# Patient Record
Sex: FEMALE | Race: BLACK OR AFRICAN AMERICAN | Employment: FULL TIME | URBAN - METROPOLITAN AREA
[De-identification: names, ages, dates, MRNs, and addresses within clinical notes are randomized per-mention and may not be internally consistent; named-entity substitution may affect disease eponyms.]

---

## 2017-08-23 ENCOUNTER — HOSPITAL ENCOUNTER (OUTPATIENT)
Dept: MAMMOGRAPHY | Age: 47
Discharge: HOME | End: 2017-08-23
Attending: OBSTETRICS & GYNECOLOGY
Payer: COMMERCIAL

## 2017-08-23 DIAGNOSIS — Z12.31 VISIT FOR SCREENING MAMMOGRAM: ICD-10-CM

## 2017-08-23 PROCEDURE — 77067 SCR MAMMO BI INCL CAD: CPT

## 2018-02-23 ENCOUNTER — HOSPITAL ENCOUNTER (EMERGENCY)
Age: 48
Discharge: HOME | End: 2018-02-23
Attending: EMERGENCY MEDICINE
Payer: COMMERCIAL

## 2018-02-23 VITALS
HEART RATE: 74 BPM | DIASTOLIC BLOOD PRESSURE: 95 MMHG | SYSTOLIC BLOOD PRESSURE: 185 MMHG | RESPIRATION RATE: 18 BRPM | WEIGHT: 265 LBS | OXYGEN SATURATION: 98 % | TEMPERATURE: 98 F | HEIGHT: 67 IN | BODY MASS INDEX: 41.59 KG/M2

## 2018-02-23 DIAGNOSIS — R47.1 DYSARTHRIA: Primary | ICD-10-CM

## 2018-02-23 LAB
ALBUMIN SERPL-MCNC: 3.1 G/DL (ref 3.5–5)
ALBUMIN/GLOB SERPL: 0.6 {RATIO} (ref 1.1–2.2)
ALP SERPL-CCNC: 83 U/L (ref 45–117)
ALT SERPL-CCNC: 23 U/L (ref 12–78)
ANION GAP SERPL CALC-SCNC: 6 MMOL/L (ref 5–15)
AST SERPL-CCNC: 19 U/L (ref 15–37)
BASOPHILS # BLD: 0 K/UL (ref 0–0.1)
BASOPHILS NFR BLD: 0 % (ref 0–1)
BILIRUB SERPL-MCNC: 0.8 MG/DL (ref 0.2–1)
BUN SERPL-MCNC: 13 MG/DL (ref 6–20)
BUN/CREAT SERPL: 14 (ref 12–20)
CALCIUM SERPL-MCNC: 8.6 MG/DL (ref 8.5–10.1)
CHLORIDE SERPL-SCNC: 102 MMOL/L (ref 97–108)
CO2 SERPL-SCNC: 29 MMOL/L (ref 21–32)
CREAT SERPL-MCNC: 0.92 MG/DL (ref 0.55–1.02)
DIFFERENTIAL METHOD BLD: ABNORMAL
EOSINOPHIL # BLD: 0.2 K/UL (ref 0–0.4)
EOSINOPHIL NFR BLD: 2 % (ref 0–7)
ERYTHROCYTE [DISTWIDTH] IN BLOOD BY AUTOMATED COUNT: 14.6 % (ref 11.5–14.5)
GLOBULIN SER CALC-MCNC: 4.8 G/DL (ref 2–4)
GLUCOSE SERPL-MCNC: 99 MG/DL (ref 65–100)
HCT VFR BLD AUTO: 38.4 % (ref 35–47)
HGB BLD-MCNC: 12.6 G/DL (ref 11.5–16)
IMM GRANULOCYTES # BLD: 0 K/UL (ref 0–0.04)
IMM GRANULOCYTES NFR BLD AUTO: 0 % (ref 0–0.5)
LYMPHOCYTES # BLD: 1.7 K/UL (ref 0.8–3.5)
LYMPHOCYTES NFR BLD: 23 % (ref 12–49)
MCH RBC QN AUTO: 25.9 PG (ref 26–34)
MCHC RBC AUTO-ENTMCNC: 32.8 G/DL (ref 30–36.5)
MCV RBC AUTO: 78.9 FL (ref 80–99)
MONOCYTES # BLD: 0.5 K/UL (ref 0–1)
MONOCYTES NFR BLD: 7 % (ref 5–13)
NEUTS SEG # BLD: 5.2 K/UL (ref 1.8–8)
NEUTS SEG NFR BLD: 68 % (ref 32–75)
NRBC # BLD: 0 K/UL (ref 0–0.01)
NRBC BLD-RTO: 0 PER 100 WBC
PLATELET # BLD AUTO: 379 K/UL (ref 150–400)
PMV BLD AUTO: 9.9 FL (ref 8.9–12.9)
POTASSIUM SERPL-SCNC: 3.4 MMOL/L (ref 3.5–5.1)
PROT SERPL-MCNC: 7.9 G/DL (ref 6.4–8.2)
RBC # BLD AUTO: 4.87 M/UL (ref 3.8–5.2)
SODIUM SERPL-SCNC: 137 MMOL/L (ref 136–145)
WBC # BLD AUTO: 7.7 K/UL (ref 3.6–11)

## 2018-02-23 PROCEDURE — 97116 GAIT TRAINING THERAPY: CPT

## 2018-02-23 PROCEDURE — 97161 PT EVAL LOW COMPLEX 20 MIN: CPT

## 2018-02-23 PROCEDURE — 80053 COMPREHEN METABOLIC PANEL: CPT | Performed by: EMERGENCY MEDICINE

## 2018-02-23 PROCEDURE — 99285 EMERGENCY DEPT VISIT HI MDM: CPT

## 2018-02-23 PROCEDURE — 85025 COMPLETE CBC W/AUTO DIFF WBC: CPT | Performed by: EMERGENCY MEDICINE

## 2018-02-23 PROCEDURE — 36415 COLL VENOUS BLD VENIPUNCTURE: CPT | Performed by: EMERGENCY MEDICINE

## 2018-02-23 RX ORDER — LOSARTAN POTASSIUM AND HYDROCHLOROTHIAZIDE 25; 100 MG/1; MG/1
1 TABLET ORAL DAILY
COMMUNITY
End: 2018-11-27 | Stop reason: SDUPTHER

## 2018-02-23 NOTE — PROGRESS NOTES
physical Therapy Emergency Department EVALUATION/DISCHARGE  Patient: Armando Borjas (89 y.o. female)  Date: 2/23/2018  Primary Diagnosis: s/p pseudo seizure          Precautions: Fall  ASSESSMENT :  Based on the objective data described below, the patient presents with impaired speech, slightly impaired strength, balance, and activity tolerance s/p pseudo seizure. Patient was able to safely ambulate but requires increased time and reports legs feeling heavy. Cues for safety with ambulation. Patient reports current symptoms are typical post pseudo seizure and resolve on their own. Patient demonstrates good insight into deficits and safety awareness. Patient safe to return home when medically cleared and no additional therapy needs indicated. Further acute physical therapy is not indicated at this time. PLAN :  Discharge Recommendations:   [x]   Home with family  []   Skilled nursing facility  []   Admission to hospital with rehab likely needed  []   Inpatient rehab referral  []   Outpatient physical therapy referral  []   Other:    Further Equipment Recommendations for Discharge: None  []   Rolling walker with 5\" wheels  []   Crutches   []   Elise Boyers   []   Wheelchair   []   Other:     COMMUNICATION/EDUCATION:   Communication/Collaboration:  [x]   Fall prevention education was provided and the patient/caregiver indicated understanding. [x]   Patient/family have participated as able and agree with findings and recommendations. []   Patient is unable to participate in plan of care at this time. Findings and recommendations were discussed with: ED Physician and Registered Nurse     SUBJECTIVE:   Patient stated This is what happens.     OBJECTIVE DATA SUMMARY:   HISTORY:    Past Medical History:   Diagnosis Date    Heart murmur     Hypertension     Ill-defined condition     pseudo seizure     Past Surgical History:   Procedure Laterality Date    HX GYN      \"cyst removal\"    HX SEPTOPLASTY      HX TONSIL AND ADENOIDECTOMY       Prior Level of Function/Home Situation: prior independence, works as a teacher   Personal factors and/or comorbidities impacting plan of care: prior pseudo seizures    EXAMINATION/PRESENTATION/DECISION MAKING:   Critical Behavior:  Neurologic State: Alert  Orientation Level: Oriented X4  Cognition: Appropriate safety awareness, Follows commands, Appropriate for age attention/concentration, Appropriate decision making     Hearing: Auditory  Auditory Impairment: None    Range Of Motion:  AROM: Within functional limits     Strength:    Strength: Generally decreased, functional (patient reports typical post pseudo seizures)     Tone & Sensation:   Tone: Normal  Sensation: Intact      Coordination:  Coordination: Generally decreased, functional (patient reports typical post pseudo seizures)    Functional Mobility:  Bed Mobility:  Rolling: Independent  Supine to Sit: Independent  Sit to Supine: Independent  Scooting: Independent  Transfers:  Sit to Stand: Independent  Stand to Sit: Independent     Balance:   Sitting: Intact  Standing: Impaired  Standing - Static: Good  Standing - Dynamic : Fair (but patient with good insight/compensation)  Ambulation/Gait Training:  Distance (ft): 60 Feet (ft)  Ambulation - Level of Assistance: Supervision  Gait Abnormalities: Decreased step clearance;Trunk sway increased (initially dragging of feet, cued to increase step clearance)  Base of Support: Widened  Speed/Rosalind: Pace decreased (<100 feet/min)  Step Length: Right shortened;Left shortened      Patient slow, cues to increase step clearance for safety.      Physical Therapy Evaluation Charge Determination   History Examination Presentation Decision-Making   LOW Complexity : Zero comorbidities / personal factors that will impact the outcome / POC LOW Complexity : 1-2 Standardized tests and measures addressing body structure, function, activity limitation and / or participation in recreation  LOW Complexity : Stable, uncomplicated  LOW Complexity : FOTO score of       Based on the above components, the patient evaluation is determined to be of the following complexity level: LOW     Pain:  Pain Scale 1: Numeric (0 - 10)  Pain Intensity 1: 3  Pain Location 1: Head  Pain Description 1: Aching     Activity Tolerance:   No s/s of VS distress   After treatment:   []         Patient left in no apparent distress sitting up in chair  [x]         Patient left in no apparent distress in bed  [x]         Call bell left within reach  [x]         Nursing notified  [x]         Caregiver present  []         Bed alarm activated    Thank you for this referral.  Reese Vega, PT, DPT   Time Calculation: 20 mins

## 2018-02-23 NOTE — ED PROVIDER NOTES
HPI Comments: 49 y.o. female with past medical history significant for pseudoseizures, HTN and heart murmur who presents via EMS from work accompanied by her boss, aunt and cousin with chief complaint of evaluation post-seizure-like activity. Per boss, pt was seated in a meeting regarding accommodations for students when she reached over to  her pen and fell forward. Char Dylanfrancois states pt's eyes were fluttering and she exhibited jerking c/w seizure-like activity. Pt remembers sitting in the meeting and suddenly feeling hot, diaphoretic, and generally weak. Pt states she noticed 10/10 headache prior to falling. Pt states headache is now 6/10 in severity. Pt states stuttering speech is typical with these episodes and resolves without intervention. Pt endorses several previous episodes of the same diagnosed as pseudoseizures related to dehydration. Pt states episodes usually occur at work in conjunction with finishing her menstrual cycles. Pt notes she started her menstrual period on 2/18/18 and today is the last day of her period. Pt states between ages [age], she was having these episodes annually. From age [age], pt only had 2 episodes total, and since turning 35, pt reports 3 such episodes. Pt notes she has been admitted for the same at [facility] while she was living in [place]. Pt states last episode occurred in [facility] and she was discharged home from the ED after evaluation. Pt denies recent increase in stress at work. Pt specifically denies n/v and numbness. These recurrent issues have been occurring for 25 years. There are no other acute medical concerns at this time. Old Chart Review:  Pt evaluated at Urgent Care in 4/2014 with rash. No previous ED visits, brain imaging.      Social hx: denies tobacco use; denies EtOH use; denies illicit drug use; special education  at [employer]  PCP: Michael Don MD    Note written by Sharron Carlton, as dictated by Janene Vidales, DO 1:45 PM    The history is provided by medical records and the patient. No  was used. Past Medical History:   Diagnosis Date    Heart murmur     Hypertension     Ill-defined condition     pseudo seizure       Past Surgical History:   Procedure Laterality Date    HX GYN      \"cyst removal\"    HX SEPTOPLASTY      HX TONSIL AND ADENOIDECTOMY           History reviewed. No pertinent family history. Social History     Social History    Marital status: SINGLE     Spouse name: N/A    Number of children: N/A    Years of education: N/A     Occupational History    Not on file. Social History Main Topics    Smoking status: Never Smoker    Smokeless tobacco: Never Used    Alcohol use No    Drug use: No    Sexual activity: Not on file     Other Topics Concern    Not on file     Social History Narrative         ALLERGIES: Decadron [dexamethasone]    Review of Systems   Constitutional: Negative for fever. Gastrointestinal: Negative for nausea and vomiting. Neurological: Positive for speech difficulty (stuttering), weakness (generalized) and headaches. All other systems reviewed and are negative. Vitals:    02/23/18 1244   BP: (!) 164/100   Pulse: 68   Resp: 18   Temp: 98.2 °F (36.8 °C)   SpO2: 100%   Weight: 120.2 kg (265 lb)   Height: 5' 7\" (1.702 m)            Physical Exam   Nursing note and vitals reviewed. Constitutional: Pt is awake and alert. Pt appears well-developed and well-nourished. NAD. HENT:   Head: Normocephalic and atraumatic. Nose: Nose normal.   Mouth/Throat: Oropharynx is clear and moist. No oropharyngeal exudate. Eyes: Conjunctivae and extraocular motions are normal. Pupils are equal, round, and reactive to light. Right eye exhibits no discharge. Left eye exhibits no discharge. No scleral icterus. Neck: No tracheal deviation present. Supple neck.   Cardiovascular: Normal rate, regular rhythm, normal heart sounds and intact distal pulses. Exam reveals no gallop and no friction rub. No murmur heard. Pulmonary/Chest: Effort normal and breath sounds normal. Pt has no wheezes. Pt has no rales. Abdominal: Soft. Pt exhibits no distension and no mass. No tenderness. Pt has no rebound and no guarding. Musculoskeletal: Pt exhibits no edema and no tenderness. Ext: Normal ROM in all four extremities; not tender to palpation; distal pulses are normal, no edema. Neurological: Pt is alert. Stuttering speech - starts as stutter, able to complete sentences. No dysphagia or dysarthria. Nonfocal neuro exam.  Normal sensory exam.  No pronator or leg drift. Skin: Skin is warm and dry. Pt is not diaphoretic. Psychiatric: Pt has a normal mood and affect. Behavior is normal.     Note written by Sharron Nguyen, as dictated by Monserrat Dumont DO 1:45 PM       MDM      ED Course       Procedures    2:50 PM  Discussed available lab and imaging results with patient and family. Both verbalize understanding and agree with care plan. Will discharge patient home with specific return precautions; no prescriptions; f/u with PCP. Walked here  Feels better  Will DC her home. Sometimes she is admitted, sometimes she goes home. Recent Results (from the past 24 hour(s))   CBC WITH AUTOMATED DIFF    Collection Time: 02/23/18  1:00 PM   Result Value Ref Range    WBC 7.7 3.6 - 11.0 K/uL    RBC 4.87 3.80 - 5.20 M/uL    HGB 12.6 11.5 - 16.0 g/dL    HCT 38.4 35.0 - 47.0 %    MCV 78.9 (L) 80.0 - 99.0 FL    MCH 25.9 (L) 26.0 - 34.0 PG    MCHC 32.8 30.0 - 36.5 g/dL    RDW 14.6 (H) 11.5 - 14.5 %    PLATELET 655 043 - 708 K/uL    MPV 9.9 8.9 - 12.9 FL    NRBC 0.0 0  WBC    ABSOLUTE NRBC 0.00 0.00 - 0.01 K/uL    NEUTROPHILS 68 32 - 75 %    LYMPHOCYTES 23 12 - 49 %    MONOCYTES 7 5 - 13 %    EOSINOPHILS 2 0 - 7 %    BASOPHILS 0 0 - 1 %    IMMATURE GRANULOCYTES 0 0.0 - 0.5 %    ABS. NEUTROPHILS 5.2 1.8 - 8.0 K/UL    ABS. LYMPHOCYTES 1.7 0.8 - 3.5 K/UL    ABS. MONOCYTES 0.5 0.0 - 1.0 K/UL    ABS. EOSINOPHILS 0.2 0.0 - 0.4 K/UL    ABS. BASOPHILS 0.0 0.0 - 0.1 K/UL    ABS. IMM. GRANS. 0.0 0.00 - 0.04 K/UL    DF AUTOMATED     METABOLIC PANEL, COMPREHENSIVE    Collection Time: 02/23/18  1:00 PM   Result Value Ref Range    Sodium 137 136 - 145 mmol/L    Potassium 3.4 (L) 3.5 - 5.1 mmol/L    Chloride 102 97 - 108 mmol/L    CO2 29 21 - 32 mmol/L    Anion gap 6 5 - 15 mmol/L    Glucose 99 65 - 100 mg/dL    BUN 13 6 - 20 MG/DL    Creatinine 0.92 0.55 - 1.02 MG/DL    BUN/Creatinine ratio 14 12 - 20      GFR est AA >60 >60 ml/min/1.73m2    GFR est non-AA >60 >60 ml/min/1.73m2    Calcium 8.6 8.5 - 10.1 MG/DL    Bilirubin, total 0.8 0.2 - 1.0 MG/DL    ALT (SGPT) 23 12 - 78 U/L    AST (SGOT) 19 15 - 37 U/L    Alk. phosphatase 83 45 - 117 U/L    Protein, total 7.9 6.4 - 8.2 g/dL    Albumin 3.1 (L) 3.5 - 5.0 g/dL    Globulin 4.8 (H) 2.0 - 4.0 g/dL    A-G Ratio 0.6 (L) 1.1 - 2.2         No results found.

## 2018-02-23 NOTE — ED NOTES
Pt ambulated with PT and RN. Note slow but steady gait. Pt tells RN and PT that she feels like she has think about walking and that she has weights on her legs. Pt states this is normal for her after a pseudo seizure. No falls. No change to speech noted since assessment.

## 2018-02-23 NOTE — DISCHARGE INSTRUCTIONS
Learning About Psychogenic Non-Epileptic Seizure  What is psychogenic non-epileptic seizure (PNES)? Psychogenic non-epileptic seizures (PNES) don't have a physical cause. They aren't caused by epilepsy. But people with epilepsy also may have PNES. People who have a lot of stress, mental illness, or emotional trauma may be more likely to have PNES. Even though PNES doesn't have a physical cause, it is a real condition. The seizures can be scary. And not knowing why you're having them can be frustrating. What happens during PNES? PNES may look like epileptic seizures. But epileptic seizures usually follow the same pattern every time. With PNES, each episode may be different. During a PNES episode, you may have jerky movements, tingling skin, or problems with coordination. You may notice changes in your vision or sense of smell. Some people have episodes often. Others have them only once in a while. For some people, episodes stop over time. Other people keep having them. How is PNES diagnosed? Your doctor will do tests to find out if you have epilepsy. An EEG test lets your doctor see the electrical activity of your brain. The test is often used to diagnose epilepsy. It helps your doctor know what types of seizures you are having. Your doctor also may do blood tests. PNES can be mistaken for epilepsy at first. As a result, some people with PNES are treated with epilepsy medicines. But most of the time, these medicines don't help. The right diagnosis allows your doctor to give you treatments that will help with the stress and other issues that may be related to PNES. How is PNES treated? Treatment varies with each person. The goals of treatment are to relieve stress and to help you learn ways to cope with difficult areas of your life. You may get medicines or counseling. A type of counseling called cognitive-behavioral therapy (CBT) may help with the stress and emotional issues.  In CBT, you learn to identify and change thinking styles that may be adding to your stress. CBT is done by licensed mental health providers, such as psychologists, social workers, and therapists. It can be done in one-on-one sessions or in a group setting. Care at home  Lowering your stress may help with PNES. Here are some things you can do. How to relax your mind  · Write. It may help to write about things that are bothering you. This helps you find out how much stress you feel and what is causing it. When you know this, you can find better ways to cope. · Let your feelings out. Talk, laugh, cry, and express anger when you need to. Talking with friends, family, a counselor, or a member of the clergy about your feelings is a healthy way to relieve stress. · Do something you enjoy. For example, listen to music or go to a movie. Practice your hobby or do volunteer work. · Meditate. This can help you relax, because you are not worrying about what happened before or what may happen in the future. · Do guided imagery. Imagine yourself in any setting that helps you feel calm. You can use audiotapes, books, or a teacher to guide you. How to relax your body  · Do something active. Exercise or activity can help reduce stress. Walking is a great way to get started. Even everyday activities such as housecleaning or yard work can help. · Do breathing exercises. For example:  ¨ From a standing position, bend forward from the waist with your knees slightly bent. Let your arms dangle close to the floor. ¨ Breathe in slowly and deeply as you return to a standing position. Roll up slowly, and lift your head last.  ¨ Hold your breath for just a few seconds in the standing position. ¨ Breathe out slowly and bend forward from the waist.  · Try yoga or shekhar chi. These techniques combine exercise and meditation. You may need some training at first to learn them.   Talk to your doctor about whether it is safe to do certain activities, such as drive or swim. Follow-up care is a key part of your treatment and safety. Be sure to make and go to all appointments, and call your doctor if you are having problems. It's also a good idea to know your test results and keep a list of the medicines you take. Where can you learn more? Go to http://chapo-ema.info/. Enter X853 in the search box to learn more about \"Learning About Psychogenic Non-Epileptic Seizure. \"  Current as of: October 14, 2016  Content Version: 11.4  © 8309-1926 [employer]. Care instructions adapted under license by [employer] (which disclaims liability or warranty for this information). If you have questions about a medical condition or this instruction, always ask your healthcare professional. [address] any warranty or liability for your use of this information.

## 2018-02-23 NOTE — ED NOTES
Family and friend with pt. Discharge instructions reviewed emphasizing f/u w/neurology and info. in discharge packet. Given to pt. No seizure activity since arrival, no syncopal episodes since arrival.  No acute distress noted. Ambulatory with family member.

## 2018-02-23 NOTE — ED TRIAGE NOTES
Pt presents via EMS from work for possible seizure, hx of pseudoseizure. Per EMS pt stated that she got hot then passed out, per bystanders pt had trouble speaking for a few seconds. Pt states this is normal after her pseudoseizure. Note stuttering type speech upon arrival but is clear and pt is fully oriented.

## 2018-02-27 ENCOUNTER — OFFICE VISIT (OUTPATIENT)
Dept: NEUROLOGY | Age: 48
End: 2018-02-27

## 2018-02-27 VITALS
HEART RATE: 75 BPM | DIASTOLIC BLOOD PRESSURE: 86 MMHG | OXYGEN SATURATION: 98 % | SYSTOLIC BLOOD PRESSURE: 156 MMHG | WEIGHT: 275 LBS | RESPIRATION RATE: 18 BRPM | BODY MASS INDEX: 43.07 KG/M2

## 2018-02-27 DIAGNOSIS — R56.9 OBSERVED SEIZURE-LIKE ACTIVITY (HCC): Primary | ICD-10-CM

## 2018-02-27 DIAGNOSIS — Z87.898 HISTORY OF PSEUDOSEIZURE: ICD-10-CM

## 2018-02-27 NOTE — PROGRESS NOTES
[facility]   NEW PATIENT EVALUATION/CONSULTATION       PATIENT NAME: Yosi Haq    MRN: [id number]    REASON FOR CONSULTATION: Seizure    18      Previous records (physician notes, laboratory reports, and radiology reports) and imaging studies were reviewed and summarized. My recommendations will be communicated back to the patient's physician(s) via electronic medical record and/or by [address] mail. HISTORY OF PRESENT ILLNESS:  Yosi Haq is a 49 y.o. right handed female presenting for evaluation of seizures. Seizure activity described as: Seen in ED 18 due to reported \"pseudoseizure\" via EMS from work. She was sitting at a meeting and suddenly slumped forward in her chair with eye fluttering and jerking of extremities. Pt reported feeling diaphoretic and generally weak per ED records along with severe headache, LUE/LLE numbness. No lost consciousness- \"felt like a dream, couldn't move/write or answer questions\" x several hours. She states her speech is still stuttering in quality since the episode. No post event confusion or excessive fatigue. She was discharged after a few hours. No associated urinary incontinence or tongue biting. She endorses a h/o pseudoseizures dx at [facility], [facility] . Events dating back 25 years. Events typically occur during her menstruation. Current seizure frequency is: yearly in her 20's/30's. In her 42's, less frequent. Last episode prior to most recent was .       Seizure RF: no FHx seizure, no h/o significant head injury, no febrile seizures or CNS infections  Last seizure-like event:  18  Current AEDs: None  Prior EE Elgie Hams per pt (no records available)  Prior neuroimaging: MRI Brain [facility])    PAST MEDICAL HISTORY:  Past Medical History:   Diagnosis Date    Heart murmur     Hypertension     Ill-defined condition     pseudo seizure       PAST SURGICAL HISTORY:  Past Surgical History:   Procedure Laterality Date    HX GYN      \"cyst removal\"    HX SEPTOPLASTY      HX TONSIL AND ADENOIDECTOMY         FAMILY HISTORY:   No FHx seizures      SOCIAL HISTORY:  Social History     Social History    Marital status: SINGLE     Spouse name: N/A    Number of children: N/A    Years of education: N/A     Social History Main Topics    Smoking status: Never Smoker    Smokeless tobacco: Never Used    Alcohol use No    Drug use: No    Sexual activity: Not on file     Other Topics Concern    Not on file     Social History Narrative         MEDICATIONS:   Current Outpatient Prescriptions   Medication Sig Dispense Refill    losartan-hydroCHLOROthiazide (HYZAAR) 100-25 mg per tablet Take 1 Tab by mouth daily. ALLERGIES:  Allergies   Allergen Reactions    Decadron [Dexamethasone] Unknown (comments)         REVIEW OF SYSTEMS:  10 point ROS reviewed with patient. Please see scanned document under media. PHYSICAL EXAM:  Vital Signs:   Visit Vitals    /86    Pulse 75    Resp 18    Wt 124.7 kg (275 lb)    LMP 02/18/2018    SpO2 98%    BMI 43.07 kg/m2        General Medical Exam:  General:  Well appearing, comfortable, in no apparent distress. Eyes/ENT: see cranial nerve examination. Neck: No masses appreciated. Full range of motion without tenderness. Respiratory:  Clear to auscultation, good air entry bilaterally. Cardiac:  Regular rate and rhythm, no murmur. GI:  Soft, non-tender, non-distended abdomen. Bowel sounds normal. No masses, organomegaly. Extremities:  No deformities, edema, or skin discoloration. Skin:  No rashes or lesions. Neurological:  · Mental Status:  Alert and oriented to person, place, and time with intermittent stuttering speech quality. No dysarthria, aphasia. · Cranial Nerves:   CNII/III/IV/VI: visual fields full to confrontation, EOMI, PERRL, no ptosis or nystagmus.    CN V: Facial sensation intact bilaterally, masseter 5/5 CN VII: Facial muscles symmetric and strong   CN VIII: Hears finger rub well bilaterally, intact vestibular function   CN IX/X: Normal palatal movement   CN XI: Full strength shoulder shrug bilaterally   CN XII: Tongue protrusion full and midline without fasciculation or atrophy  · Motor: Normal tone and muscle bulk with no pronator drift. Individual muscle group testing:  Shoulder abduction:   Left:5/5   Right : 5/5    Shoulder adduction:   Left:5/5   Right : 5/5    Elbow flexion:      Left:5/5   Right : 5/5  Elbow extension:    Left:5/5   Right : 5/5   Wrist flexion:    Left:5/5   Right : 5/5  Wrist extension:    Left:5/5   Right : 5/5  Arm pronation:   Left:5/5   Right : 5/5  Arm supination:   Left:5/5   Right : 5/5    Finger flexion:    Left:5/5   Right : 5/5    Finger extension:   Left:5/5   Right : 5/5   Finger abduction:  Left:5/5   Right : 5/5   Finger adduction:   Left:5/5   Right : 5/5  Hip flexion:     Left:5/5   Right : 5/5         Hip extension:   Left:5/5   Right : 5/5    Knee flexion:     Left:5/5   Right : 5/5    Knee extension:   Left:5/5   Right : 5/5    Dorsiflexion:     Left:5/5   Right : 5/5  Plantar flexion:    Left:5/5   Right : 5/5      · MSRs: No crossed adductors or clonus. RIGHT  LEFT   Brachioradialis 2+ 2+   Biceps 2+ 2+   Triceps 2+ 2+   Knee 1+ 1+   Achilles 2+ 2+        Plantar response Downward Downward          · Sensation: Normal and symmetric perception of pinprick, temperature, light touch, proprioception, and vibration; (-) Romberg. · Coordination: No dysmetria. Normal rapid alternating movements; finger-to-nose and heel-to- shin testing are within normal limits. · Gait: Normal native and stress (tandem/heel/toe walking). PERTINENT DATA:  INTERNAL RECORDS:  The patient's electronic medical record was reviewed.   The relevant details include:   Component      Latest Ref Rng & Units 2/23/2018 2/23/2018           1:00 PM  1:00 PM   WBC      3.6 - 11.0 K/uL  7.7 RBC      3.80 - 5.20 M/uL  4.87   HGB      11.5 - 16.0 g/dL  12.6   HCT      35.0 - 47.0 %  38.4   MCV      80.0 - 99.0 FL  78.9 (L)   MCH      26.0 - 34.0 PG  25.9 (L)   MCHC      30.0 - 36.5 g/dL  32.8   RDW      11.5 - 14.5 %  14.6 (H)   PLATELET      487 - 646 K/uL  379   MPV      8.9 - 12.9 FL  9.9   NRBC      0  WBC  0.0   ABSOLUTE NRBC      0.00 - 0.01 K/uL  0.00   NEUTROPHILS      32 - 75 %  68   LYMPHOCYTES      12 - 49 %  23   MONOCYTES      5 - 13 %  7   EOSINOPHILS      0 - 7 %  2   BASOPHILS      0 - 1 %  0   IMMATURE GRANULOCYTES      0.0 - 0.5 %  0   ABS. NEUTROPHILS      1.8 - 8.0 K/UL  5.2   ABS. LYMPHOCYTES      0.8 - 3.5 K/UL  1.7   ABS. MONOCYTES      0.0 - 1.0 K/UL  0.5   ABS. EOSINOPHILS      0.0 - 0.4 K/UL  0.2   ABS. BASOPHILS      0.0 - 0.1 K/UL  0.0   ABS. IMM. GRANS.      0.00 - 0.04 K/UL  0.0   DF        AUTOMATED   Sodium      136 - 145 mmol/L 137    Potassium      3.5 - 5.1 mmol/L 3.4 (L)    Chloride      97 - 108 mmol/L 102    CO2      21 - 32 mmol/L 29    Anion gap      5 - 15 mmol/L 6    Glucose      65 - 100 mg/dL 99    BUN      6 - 20 MG/DL 13    Creatinine      0.55 - 1.02 MG/DL 0.92    BUN/Creatinine ratio      12 - 20   14    GFR est AA      >60 ml/min/1.73m2 >60    GFR est non-AA      >60 ml/min/1.73m2 >60    Calcium      8.5 - 10.1 MG/DL 8.6    Bilirubin, total      0.2 - 1.0 MG/DL 0.8    ALT (SGPT)      12 - 78 U/L 23    AST      15 - 37 U/L 19    Alk. phosphatase      45 - 117 U/L 83    Protein, total      6.4 - 8.2 g/dL 7.9    Albumin      3.5 - 5.0 g/dL 3.1 (L)    Globulin      2.0 - 4.0 g/dL 4.8 (H)    A-G Ratio      1.1 - 2.2   0.6 (L)        ASSESSMENT:      ICD-10-CM ICD-9-CM    1. Observed seizure-like activity (HCC) R56.9 780.39 EEG SLEEP DEPRIVED   2.  History of pseudoseizure Z80.75 V16.53    49year old AAF presenting with 25+ year history of seizure-like activity with decreasing overall frequency occasionally associated with prolonged neurologic sequela (immobility, weakness, stuttering). Most recent event 2/23/18 occurring while at work with observed extremity jerking, non-responsiveness without LOC and prolonged post-event stuttering prompting ED evaluation. Examination is presently non-focal except for intermittent variable stuttering. Presentation appears most c/w with PNES. Prior extensive investigations 2006 at [facility] with normal w/u, dx of pseudoseizures per pt. Will attempt to obtain these records. In the meantime, will obtain a baseline sleep-deprived EEG. I have discussed seizure precautions with Ms. Blair Pérez including no driving x 6 months from most recent seizure like activity. PLAN:  · Obtain records from [facility]  · Sleep deprived EEG  · Seizure precautions    Follow-up Disposition:  Return in about 3 months (around 5/27/2018). I have discussed the diagnosis with the patient and the intended plan as seen in the above orders. Patient is in agreement. The patient has received an after-visit summary and questions were answered concerning future plans. Arya Mcfarland DO  Staff Neurologist  Diplomate, American Board of Psychiatry & Neurology       CC Referring provider:  Dr. Brady Starr

## 2018-02-27 NOTE — PATIENT INSTRUCTIONS

## 2018-02-27 NOTE — MR AVS SNAPSHOT
[facility] [address] 
[phone] Patient: Madiha Ovalles MRN: [id number] :1970 Visit Information Date & Time Provider Department Dept. Phone Encounter #  
 2018  9:00 AM [facility] [id number] Follow-up Instructions Return in about 3 months (around 2018). Upcoming Health Maintenance Date Due DTaP/Tdap/Td series (1 - Tdap) 1990 PAP AKA CERVICAL CYTOLOGY 1990 Influenza Age 5 to Adult 2017 Allergies as of 2018  Review Complete On: 2018 By: Susana Ramos DO Severity Noted Reaction Type Reactions Decadron [Dexamethasone]  2014    Unknown (comments) Current Immunizations  Never Reviewed No immunizations on file. Not reviewed this visit You Were Diagnosed With   
  
 Codes Comments Observed seizure-like activity (HCC)    -  Primary ICD-10-CM: R56.9 ICD-9-CM: 780.39 Vitals BP Pulse Resp Weight(growth percentile) LMP SpO2  
 156/86 75 18 275 lb (124.7 kg) 2018 98% BMI OB Status Smoking Status 43.07 kg/m2 Having regular periods Never Smoker Vitals History BMI and BSA Data Body Mass Index Body Surface Area 43.07 kg/m 2 2.43 m 2 Preferred Pharmacy Pharmacy Name Phone [facility] [address] AT [address] [place] [address] Drive [phone] Your Updated Medication List  
  
   
This list is accurate as of 18  9:29 AM.  Always use your most recent med list.  
  
  
  
  
 losartan-hydroCHLOROthiazide 100-25 mg per tablet Commonly known as:  HYZAAR Take 1 Tab by mouth daily. Follow-up Instructions Return in about 3 months (around 2018). To-Do List   
 2018 Neurology:  EEG SLEEP DEPRIVED Patient Instructions A Healthy Lifestyle: Care Instructions Your Care Instructions A healthy lifestyle can help you feel good, stay at a healthy weight, and have plenty of energy for both work and play. A healthy lifestyle is something you can share with your whole family. A healthy lifestyle also can lower your risk for serious health problems, such as high blood pressure, heart disease, and diabetes. You can follow a few steps listed below to improve your health and the health of your family. Follow-up care is a key part of your treatment and safety. Be sure to make and go to all appointments, and call your doctor if you are having problems. It's also a good idea to know your test results and keep a list of the medicines you take. How can you care for yourself at home? · Do not eat too much sugar, fat, or fast foods. You can still have dessert and treats now and then. The goal is moderation. · Start small to improve your eating habits. Pay attention to portion sizes, drink less juice and soda pop, and eat more fruits and vegetables. ¨ Eat a healthy amount of food. A 3-ounce serving of meat, for example, is about the size of a deck of cards. Fill the rest of your plate with vegetables and whole grains. ¨ Limit the amount of soda and sports drinks you have every day. Drink more water when you are thirsty. ¨ Eat at least 5 servings of fruits and vegetables every day. It may seem like a lot, but it is not hard to reach this goal. A serving or helping is 1 piece of fruit, 1 cup of vegetables, or 2 cups of leafy, raw vegetables. Have an apple or some carrot sticks as an afternoon snack instead of a candy bar. Try to have fruits and/or vegetables at every meal. 
· Make exercise part of your daily routine. You may want to start with simple activities, such as walking, bicycling, or slow swimming. Try to be active 30 to 60 minutes every day.  You do not need to do all 30 to 60 minutes all at once. For example, you can exercise 3 times a day for 10 or 20 minutes. Moderate exercise is safe for most people, but it is always a good idea to talk to your doctor before starting an exercise program. 
· Keep moving. Veto Jennifer the lawn, work in the garden, or [employer]. Take the stairs instead of the elevator at work. · If you smoke, quit. People who smoke have an increased risk for heart attack, stroke, cancer, and other lung illnesses. Quitting is hard, but there are ways to boost your chance of quitting tobacco for good. ¨ Use nicotine gum, patches, or lozenges. ¨ Ask your doctor about stop-smoking programs and medicines. ¨ Keep trying. In addition to reducing your risk of diseases in the future, you will notice some benefits soon after you stop using tobacco. If you have shortness of breath or asthma symptoms, they will likely get better within a few weeks after you quit. · Limit how much alcohol you drink. Moderate amounts of alcohol (up to 2 drinks a day for men, 1 drink a day for women) are okay. But drinking too much can lead to liver problems, high blood pressure, and other health problems. Family health If you have a family, there are many things you can do together to improve your health. · Eat meals together as a family as often as possible. · Eat healthy foods. This includes fruits, vegetables, lean meats and dairy, and whole grains. · Include your family in your fitness plan. Most people think of activities such as jogging or tennis as the way to fitness, but there are many ways you and your family can be more active. Anything that makes you breathe hard and gets your heart pumping is exercise. Here are some tips: 
¨ Walk to do errands or to take your child to school or the bus. ¨ Go for a family bike ride after dinner instead of watching TV. Where can you learn more? Go to http://chapo-ema.info/. Enter J616 in the search box to learn more about \"A Healthy Lifestyle: Care Instructions. \" Current as of: May 12, 2017 Content Version: 11.4 © 3242-4737 Healthwise, [employer]. Care instructions adapted under license by [employer] (which disclaims liability or warranty for this information). If you have questions about a medical condition or this instruction, always ask your healthcare professional. [address] any warranty or liability for your use of this information. Introducing [facility]! Karie Sherwood introduces [employer] patient portal. Now you can access parts of your medical record, email your doctor's office, and request medication refills online. 1. In your internet browser, go to [email or web address] 2. Click on the First Time User? Click Here link in the Sign In box. You will see the New Member Sign Up page. 3. Enter your [employer] Access Code exactly as it appears below. You will not need to use this code after youve completed the sign-up process. If you do not sign up before the expiration date, you must request a new code. · [employer] Access Code: [email or web address] Expires: 5/24/2018  1:59 PM 
 
4. Enter the last four digits of your Social Security Number (xxxx) and Date of Birth (mm/dd/yyyy) as indicated and click Submit. You will be taken to the next sign-up page. 5. Create a [employer] ID. This will be your [employer] login ID and cannot be changed, so think of one that is secure and easy to remember. 6. Create a [employer] password. You can change your password at any time. 7. Enter your Password Reset Question and Answer. This can be used at a later time if you forget your password. 8. Enter your e-mail address. You will receive e-mail notification when new information is available in [address] 9. Click Sign Up. You can now view and download portions of your medical record. 10. Click the Download Summary menu link to download a portable copy of your medical information. If you have questions, please visit the Frequently Asked Questions section of the [email or web address] website. Remember, [employer]com is NOT to be used for urgent needs. For medical emergencies, dial 911. Now available from your iPhone and Android! Please provide this summary of care documentation to your next provider. Your primary care clinician is listed as Phys Other. If you have any questions after today's visit, please call [phone].

## 2018-02-28 ENCOUNTER — DOCUMENTATION ONLY (OUTPATIENT)
Dept: NEUROLOGY | Age: 48
End: 2018-02-28

## 2018-03-02 ENCOUNTER — HOSPITAL ENCOUNTER (OUTPATIENT)
Dept: NEUROLOGY | Age: 48
Discharge: HOME | End: 2018-03-02
Attending: PSYCHIATRY & NEUROLOGY
Payer: COMMERCIAL

## 2018-03-02 DIAGNOSIS — R56.9 OBSERVED SEIZURE-LIKE ACTIVITY (HCC): ICD-10-CM

## 2018-03-02 DIAGNOSIS — R56.9 CONVULSIONS, UNSPECIFIED CONVULSION TYPE (HCC): ICD-10-CM

## 2018-03-02 DIAGNOSIS — R41.82 ALTERED MENTAL STATUS, UNSPECIFIED ALTERED MENTAL STATUS TYPE: ICD-10-CM

## 2018-03-02 PROCEDURE — 95819 EEG AWAKE AND ASLEEP: CPT

## 2018-03-02 NOTE — PROCEDURES
[address]  EEG    Griselda Garfinkel  MR#: [id number]  : 1970  ACCOUNT #: [de-identified]   DATE OF SERVICE: 2018    CLINICAL INDICATION:  The patient is a 18-year-old female with a history of possible seizure activity. The patient with observed seizure type activity, possibly pseudoseizures. EEG to rule out seizures, rule out convulsion, rule out EEG abnormality. ELECTROENCEPHALOGRAM CLASSIFICATION:  Normal awake and asleep. DESCRIPTION OF PROCEDURE:  This is a 16-channel EEG recording with EKG monitoring done for a sleep-deprived EEG. During this recording, the patient had a posteriorly located occipital alpha rhythm of 9-10 Hz occipital alpha rhythm that attenuates with eye opening. During the recording, the patient did enter states of sleep, with K complexes and sleep spindles in the central head regions. No clear areas of focal slowing were identified. Neither photic stimulation, no hyperventilation were performed. There were no clear areas of spike discharges seen. No recorded spells of any type. INTERPRETATION:  This is a normal awake and asleep electroencephalogram, showing no electroencephalogram abnormality, no spike discharges, no focal slowing, no focal abnormality, with the patient normal in awake and asleep. Clinical correlation recommended.       MD THANIA Thompson / PINA  D: 2018 11:17     T: 2018 15:52  JOB #: [id number]  CC: Sofia Christy MD

## 2018-03-12 ENCOUNTER — TELEPHONE (OUTPATIENT)
Dept: NEUROLOGY | Age: 48
End: 2018-03-12

## 2018-03-21 ENCOUNTER — TELEPHONE (OUTPATIENT)
Dept: NEUROLOGY | Age: 48
End: 2018-03-21

## 2018-03-21 NOTE — TELEPHONE ENCOUNTER
----- Message from Og Pizano sent at 3/21/2018 12:55 PM EDT -----  Regarding: Dr. Radha Pereira  The patient received a call from Sonia Lewis and is requesting a call back.  R)[phone] (j)[phone]

## 2018-04-15 LAB
CREATININE, EXTERNAL: 0.85
LDL-C, EXTERNAL: 154

## 2018-04-23 ENCOUNTER — OFFICE VISIT (OUTPATIENT)
Dept: CARDIOLOGY CLINIC | Age: 48
End: 2018-04-23

## 2018-04-23 VITALS
SYSTOLIC BLOOD PRESSURE: 90 MMHG | HEART RATE: 88 BPM | BODY MASS INDEX: 43.79 KG/M2 | DIASTOLIC BLOOD PRESSURE: 60 MMHG | OXYGEN SATURATION: 98 % | WEIGHT: 279 LBS | RESPIRATION RATE: 16 BRPM | HEIGHT: 67 IN

## 2018-04-23 DIAGNOSIS — R56.9 CONVULSIONS, UNSPECIFIED CONVULSION TYPE (HCC): ICD-10-CM

## 2018-04-23 DIAGNOSIS — R55 SYNCOPE, UNSPECIFIED SYNCOPE TYPE: Primary | ICD-10-CM

## 2018-04-23 PROBLEM — E66.01 OBESITY, MORBID (HCC): Status: ACTIVE | Noted: 2018-04-23

## 2018-04-23 NOTE — MR AVS SNAPSHOT
[address] [facility] [address] 
[phone] Patient: Zenia Cm MRN: [id number] :1970 Visit Information Date & Time Provider Department Dept. Phone Encounter #  
 2018 10:00 AM Florina Peck [facility] Curtis Chong [phone] [id number] Follow-up Instructions Return in about 3 weeks (around 2018). Follow-up and Disposition History Your Appointments 5/15/2018 11:00 AM  
New Patient with Lyudmila Perry MD  
[address] ([facility]) Appt Note: NP per Dr. Win Au Dx: syncope  
 [address] [address]  
[address] [place] [address]  
  
    
 2018  8:40 AM  
Follow Up with DO Yary [facility]cial [facility] Appt Note: 3 month f/u seizure [address] [facility] [address]  
[phone]  
  
   
 [address]  [address] Upcoming Health Maintenance Date Due DTaP/Tdap/Td series (1 - Tdap) 1990 PAP AKA CERVICAL CYTOLOGY 1990 Influenza Age 5 to Adult 2017 Allergies as of 2018  Review Complete On: 2018 By: Florina Peck MD  
  
 Severity Noted Reaction Type Reactions Decadron [Dexamethasone]  2014    Unknown (comments) Current Immunizations  Never Reviewed No immunizations on file. Not reviewed this visit You Were Diagnosed With   
  
 Codes Comments Convulsions, unspecified convulsion type [facility]    -  Primary ICD-10-CM: R56.9 ICD-9-CM: 780.39 Vitals BP Pulse Resp Height(growth percentile) Weight(growth percentile) SpO2  
 90/60 (BP 1 Location: Right arm, BP Patient Position: Standing) 88 16 5' 7\" (1.702 m) 279 lb (126.6 kg) 98% BMI OB Status Smoking Status 43.7 kg/m2 Having regular periods Never Smoker Vitals History BMI and BSA Data Body Mass Index Body Surface Area 43.7 kg/m 2 2.45 m 2 Preferred Pharmacy Pharmacy Name Phone [facility] [address] AT [address] [place] [address] Drive [phone] Your Updated Medication List  
  
   
This list is accurate as of 4/23/18 11:56 AM.  Always use your most recent med list.  
  
  
  
  
 losartan-hydroCHLOROthiazide 100-25 mg per tablet Commonly known as:  HYZAAR Take 1 Tab by mouth daily. We Performed the Following AMB POC EKG ROUTINE W/ 12 LEADS, INTER & REP [36355 CPT(R)] Follow-up Instructions Return in about 3 weeks (around 5/14/2018). Patient Instructions Echo now Refer to  for TILT Follow up with Reba Rowe after tilt table Introducing [facility]! [employer] introduces [employer] patient portal. Now you can access parts of your medical record, email your doctor's office, and request medication refills online. 1. In your internet browser, go to [email or web address] 2. Click on the First Time User? Click Here link in the Sign In box. You will see the New Member Sign Up page. 3. Enter your Pre[employer] Access Code exactly as it appears below. You will not need to use this code after youve completed the sign-up process. If you do not sign up before the expiration date, you must request a new code. · Pre[employer] Access Code: [email or web address] Expires: 5/24/2018  2:59 PM 
 
4. Enter the last four digits of your Social Security Number (xxxx) and Date of Birth (mm/dd/yyyy) as indicated and click Submit. You will be taken to the next sign-up page. 5. Create a PrePay ID. This will be your PrePay login ID and cannot be changed, so think of one that is secure and easy to remember. 6. Create a [employer] password. You can change your password at any time. 7. Enter your Password Reset Question and Answer. This can be used at a later time if you forget your password. 8. Enter your e-mail address. You will receive e-mail notification when new information is available in [address] 9. Click Sign Up. You can now view and download portions of your medical record. 10. Click the Download Summary menu link to download a portable copy of your medical information. If you have questions, please visit the Frequently Asked Questions section of the [employer] website. Remember, [employer] is NOT to be used for urgent needs. For medical emergencies, dial 911. Now available from your iPhone and Android! Please provide this summary of care documentation to your next provider. Your primary care clinician is listed as Shakeel Mathew. If you have any questions after today's visit, please call [phone].

## 2018-04-23 NOTE — PROGRESS NOTES
HISTORY OF PRESENT ILLNESS  Francesca Nicole is a 49 y.o. female. Patient with HTN and pseudoseizure in 2006 and 2/18  Also h/o MVP  Past Medical History:   Diagnosis Date    Heart murmur     Hypertension     Ill-defined condition     pseudo seizure     Past Surgical History:   Procedure Laterality Date    HX GYN      \"cyst removal\"    HX SEPTOPLASTY      HX TONSIL AND ADENOIDECTOMY       Social History   Substance Use Topics    Smoking status: Never Smoker    Smokeless tobacco: Never Used    Alcohol use No     History reviewed. No pertinent family history. HPI  Patient has a history of fainting since he was in high school. Nonetheless the last episodes of unresponsiveness. No true syncopal episodes as per her descriptions. She was in fact unable to speak or move but she does not she remember everything during this episodes. Therefore there was not a true loss of consciousness episodes including the last one in February 2018. She has had no prodromes prior to this episodes. She has had no chest pain dyspnea palpitations or dizziness. Review of Systems   Respiratory: Negative. Cardiovascular: Negative. Neurological: Positive for seizures. Physical Exam  Physical Exam   Blood pressure 90/60, pulse 88, resp. rate 16, height 5' 7\" (1.702 m), weight 126.6 kg (279 lb), SpO2 98 %. Constitutional: She is oriented to person, place, and time. She appears well-developed and well-nourished. No distress. HENT: Head: Normocephalic. Eyes: No scleral icterus. Neck: Normal range of motion. Neck supple. No JVD present. No tracheal deviation present. Cardiovascular: Normal rate, regular rhythm, normal heart sounds and intact distal pulses. Exam reveals no gallop and no friction rub. No murmur heard. Pulmonary/Chest: Effort normal and breath sounds normal. No stridor. No respiratory distress, wheezes or  rales. Abdominal: She exhibits no distension. Musculoskeletal: She exhibits no edema. Neurological: She is alert and oriented to person, place, and time. Coordination normal.   Skin: Skin is warm. No rash noted. Not diaphoretic. No erythema. Psychiatric:  Normal mood and affect. Behavior is normal.   Current Outpatient Prescriptions on File Prior to Visit   Medication Sig Dispense Refill    losartan-hydroCHLOROthiazide (HYZAAR) 100-25 mg per tablet Take 1 Tab by mouth daily. No current facility-administered medications on file prior to visit. ASSESSMENT and PLAN  Seizures: The description of the patient's episodes does not suggest a cardiac etiology at this point. In fact the pain mentions remains aware of her surroundings even if unable to move or talk. Neurological exam today reveals a normal sinus rhythm with normal intervals and minor nonspecific T-wave abnormalities. She is mildly orthostatic today although her blood pressure on arrival was 140/80. Discussed options proceed with a transthoracic echocardiogram also revealed her previous history of mitral valve prolapse. I feel that a Holter monitor would not be very helpful at this time she has no history of palpitation mass that is obtain at the time of seizure episode may not be helpful. As above she is mildly orthostatic today. I would at this point given also the previous history of fainting in a younger age to proceed with a tilt table test to rule out a neurocardiac issue    Hypertension: For now no changes in medications. Potential changes will depend on results of the tilt table tests as well. See her back after the above-mentioned tests.

## 2018-05-01 ENCOUNTER — CLINICAL SUPPORT (OUTPATIENT)
Dept: CARDIOLOGY CLINIC | Age: 48
End: 2018-05-01

## 2018-05-01 DIAGNOSIS — I51.7 LVH (LEFT VENTRICULAR HYPERTROPHY): ICD-10-CM

## 2018-05-01 DIAGNOSIS — R55 SYNCOPE, UNSPECIFIED SYNCOPE TYPE: Primary | ICD-10-CM

## 2018-05-15 ENCOUNTER — OFFICE VISIT (OUTPATIENT)
Dept: CARDIOLOGY CLINIC | Age: 48
End: 2018-05-15

## 2018-05-15 VITALS
HEART RATE: 78 BPM | HEIGHT: 67 IN | SYSTOLIC BLOOD PRESSURE: 152 MMHG | WEIGHT: 281 LBS | BODY MASS INDEX: 44.1 KG/M2 | DIASTOLIC BLOOD PRESSURE: 82 MMHG

## 2018-05-15 DIAGNOSIS — R55 SYNCOPE, UNSPECIFIED SYNCOPE TYPE: Primary | ICD-10-CM

## 2018-05-15 DIAGNOSIS — Z01.812 PRE-PROCEDURE LAB EXAM: ICD-10-CM

## 2018-05-15 DIAGNOSIS — E66.01 OBESITY, MORBID (HCC): ICD-10-CM

## 2018-05-15 DIAGNOSIS — I10 ESSENTIAL HYPERTENSION: ICD-10-CM

## 2018-05-15 NOTE — PATIENT INSTRUCTIONS
Your tilt table test procedure has been scheduled for 5/21/18 at 7:30pm, at [facility].    Please report to Admitting Department by 6:15am, or 2 hours prior to your scheduled procedure. Please bring a list of your current medications and medication bottles, if able, to the hospital on this day. You will need labs drawn prior to your procedure. Please go to [facility] to have this done as soon as you are able to. You not should stop your medications prior to your scheduled procedure. After your procedure, you will need to follow up with Dr. Perfecto Ovalle.  Your follow-up appointment has been scheduled for 6/19/18 at 8:40am.

## 2018-05-15 NOTE — PROGRESS NOTES
Cardiac Electrophysiology OFFICE Consultation Note     Subjective:      Scott Zafar is a 49 y.o. patient who is seen for evaluation of syncope   Dr Camilla Mathew referred her  EEG was negative with neurologist  Family hx has cancer and CAD and MI but no sudden death  She has had syncope all her life with sitting and standing  She has no prodrome  Last episode in Feb 2018 was sitting. She was hearing things around her but could not have strength to get back up  She had echo with normal LVEF and mild to moderate LVH    Patient Active Problem List   Diagnosis Code    Altered mental status, unspecified R41.82    Convulsion ([facility] [address] [facility] R56.9    Obesity, morbid ([facility] E66.01     Current Outpatient Prescriptions   Medication Sig Dispense Refill    losartan-hydroCHLOROthiazide (HYZAAR) 100-25 mg per tablet Take 1 Tab by mouth daily. Allergies   Allergen Reactions    Decadron [Dexamethasone] Unknown (comments)     Past Medical History:   Diagnosis Date    Heart murmur     Hypertension     Ill-defined condition     pseudo seizure     Past Surgical History:   Procedure Laterality Date    HX GYN      \"cyst removal\"    HX SEPTOPLASTY      HX TONSIL AND ADENOIDECTOMY       No family history of syncope  Social History   Substance Use Topics    Smoking status: Never Smoker    Smokeless tobacco: Never Used    Alcohol use No        Review of Systems:   Constitutional: Negative for fever, chills, weight loss, malaise/fatigue. HEENT: Negative for nosebleeds, vision changes. Respiratory: Negative for cough, hemoptysis  Cardiovascular: Negative for chest pain, palpitations, orthopnea, claudication, leg swelling, + syncope, and no PND. Gastrointestinal: Negative for nausea, vomiting, diarrhea, blood in stool and melena. Genitourinary: Negative for dysuria, and hematuria. GYN usually heavy menstrual cycle and could trigger syncope  Musculoskeletal: Negative for myalgias, arthralgia. Skin: Negative for rash. Heme: Does not bleed or bruise easily. Neurological: Negative for speech change and focal weakness     Objective:     Visit Vitals    /82 (BP 1 Location: Left arm, BP Patient Position: Sitting)    Pulse 78    Ht 5' 7\" (1.702 m)    Wt 281 lb (127.5 kg)    BMI 44.01 kg/m2      Physical Exam:   Constitutional: well-developed and well-nourished. No respiratory distress. Head: Normocephalic and atraumatic. Eyes: Pupils are equal, round  ENT: hearing normal  Neck: supple. No JVD present. Cardiovascular: Normal rate, regular rhythm. Exam reveals no gallop and no friction rub. No murmur heard. Pulmonary/Chest: Effort normal and breath sounds normal. No wheezes. Abdominal: Soft, obese  Musculoskeletal: no edema. Neurological: alert,oriented. Skin: Skin is warm and dry  Psychiatric: normal mood and affect. Behavior is normal. Judgment and thought content normal.      EKG: normal sinus rhythm. Assessment/Plan:       ICD-10-CM ICD-9-CM    1. Syncope, unspecified syncope type R55 780.2    2. Obesity, morbid ([address] E66.01 278.01    3. Essential hypertension I10 401.9      reviewed medications and side effects in detail   She likely has neurocardiogenic syncope  BP may be issue and need to change to beta blocker  She agrees to tilt test and possible change of medication    Thank you for involving me in this patient's care and please call with further concerns or questions. Eyl Schuster M.D.   Electrophysiology/Cardiology  [facility] [place]  [address] , [address]  [address]  [phone]

## 2018-05-15 NOTE — MR AVS SNAPSHOT
[address] [facility] [address] [place] 
[phone] Patient: Zenia Cm MRN: [id number] :1970 Visit Information Date & Time Provider Department Dept. Phone Encounter #  
 5/15/2018 11:00 AM Lyudmila Perry MD [facility] Chong [phone] [id number] Your Appointments 2018  8:40 AM  
Follow Up with Lesley Collins DO [facility] [address] [facility]) Appt Note: 3 month f/u seizure [address] [facility] [address]  
[address] [place] [address] [facility] [address] [address]  
  
    
 2018  8:40 AM  
ESTABLISHED PATIENT with Lyudmila Perry MD  
[facility] ([address] [facility]) Appt Note: 4 week tilt table test f/u  
 [address] [facility] [address] [place] [address]  
[address] [facility] [address] Upcoming Health Maintenance Date Due DTaP/Tdap/Td series (1 - Tdap) 1990 PAP AKA CERVICAL CYTOLOGY 1990 Influenza Age 5 to Adult 2018 Allergies as of 5/15/2018  Review Complete On: 5/15/2018 By: Lyudmila Perry MD  
  
 Severity Noted Reaction Type Reactions Decadron [Dexamethasone]  2014    Unknown (comments) Current Immunizations  Never Reviewed No immunizations on file. Not reviewed this visit You Were Diagnosed With   
  
 Codes Comments Syncope, unspecified syncope type    -  Primary ICD-10-CM: R55 
ICD-9-CM: 780.2 Obesity, morbid ([facility] [address]     ICD-10-CM: E66.01 
ICD-9-CM: 278.01 Essential hypertension     ICD-10-CM: I10 
ICD-9-CM: 401.9 Pre-procedure lab exam     ICD-10-CM: A19.852 ICD-9-CM: V72.63 Vitals BP Pulse Height(growth percentile) Weight(growth percentile) BMI OB Status  152/82 (BP 1 Location: Left arm, BP Patient Position: Sitting) 78 5' 7\" (1.702 m) 281 lb (127.5 kg) 44.01 kg/m2 Having regular periods Smoking Status Never Smoker Vitals History BMI and BSA Data Body Mass Index Body Surface Area 44.01 kg/m 2 2.46 m 2 Preferred Pharmacy Pharmacy Name Phone [facility], [address] AT [address] [place] [address] Drive [phone] Your Updated Medication List  
  
   
This list is accurate as of 5/15/18 11:56 AM.  Always use your most recent med list.  
  
  
  
  
 losartan-hydroCHLOROthiazide 100-25 mg per tablet Commonly known as:  HYZAAR Take 1 Tab by mouth daily. We Performed the Following CBC WITH AUTOMATED DIFF [29449 CPT(R)] METABOLIC PANEL, BASIC [26867 CPT(R)] Patient Instructions Your tilt table test procedure has been scheduled for 5/21/18 at 7:30pm, at [facility]. 
 
Please report to Admitting Department by 6:15am, or 2 hours prior to your scheduled procedure. Please bring a list of your current medications and medication bottles, if able, to the hospital on this day. You will need labs drawn prior to your procedure. Please go to [facility] to have this done as soon as you are able to. You not should stop your medications prior to your scheduled procedure. After your procedure, you will need to follow up with Dr. Max Velazquez. Your follow-up appointment has been scheduled for 6/19/18 at 8:40am.  
 
 
 
 
 
 
 Patient Instructions History Introducing [facility]! [facility] introduces MD Insider patient portal. Now you can access parts of your medical record, email your doctor's office, and request medication refills online. 1. In your internet browser, go to [email or web address] 2. Click on the First Time User? Click Here link in the Sign In box. You will see the New Member Sign Up page. 3. Enter your MD Insider Access Code exactly as it appears below.  You will not need to use this code after youve completed the sign-up process. If you do not sign up before the expiration date, you must request a new code. · [employer] Access Code: [email or web address] Expires: 5/24/2018  2:59 PM 
 
4. Enter the last four digits of your Social Security Number (xxxx) and Date of Birth (mm/dd/yyyy) as indicated and click Submit. You will be taken to the next sign-up page. 5. Create a [employer] ID. This will be your [employer] login ID and cannot be changed, so think of one that is secure and easy to remember. 6. Create a [employer] password. You can change your password at any time. 7. Enter your Password Reset Question and Answer. This can be used at a later time if you forget your password. 8. Enter your e-mail address. You will receive e-mail notification when new information is available in [address] 9. Click Sign Up. You can now view and download portions of your medical record. 10. Click the Download Summary menu link to download a portable copy of your medical information. If you have questions, please visit the Frequently Asked Questions section of the [employer] website. Remember, [employer] is NOT to be used for urgent needs. For medical emergencies, dial 911. Now available from your iPhone and Android! Please provide this summary of care documentation to your next provider. Your primary care clinician is listed as Alejandro Goins. If you have any questions after today's visit, please call [phone].

## 2018-05-15 NOTE — PROGRESS NOTES
Chief Complaint   Patient presents with    Dizziness    New Patient     referred by Dr. Bunnie Skiff     Verified patient with two types of identifiers. Verified medications with the patient.     Verified patient's pharmacy

## 2018-05-16 LAB
BASOPHILS # BLD AUTO: 0 X10E3/UL (ref 0–0.2)
BASOPHILS NFR BLD AUTO: 0 %
BUN SERPL-MCNC: 12 MG/DL (ref 6–24)
BUN/CREAT SERPL: 17 (ref 9–23)
CALCIUM SERPL-MCNC: 9 MG/DL (ref 8.7–10.2)
CHLORIDE SERPL-SCNC: 101 MMOL/L (ref 96–106)
CO2 SERPL-SCNC: 22 MMOL/L (ref 18–29)
CREAT SERPL-MCNC: 0.7 MG/DL (ref 0.57–1)
EOSINOPHIL # BLD AUTO: 0.2 X10E3/UL (ref 0–0.4)
EOSINOPHIL NFR BLD AUTO: 2 %
ERYTHROCYTE [DISTWIDTH] IN BLOOD BY AUTOMATED COUNT: 14.9 % (ref 12.3–15.4)
GFR SERPLBLD CREATININE-BSD FMLA CKD-EPI: 103 ML/MIN/1.73
GFR SERPLBLD CREATININE-BSD FMLA CKD-EPI: 118 ML/MIN/1.73
GLUCOSE SERPL-MCNC: 97 MG/DL (ref 65–99)
HCT VFR BLD AUTO: 38.1 % (ref 34–46.6)
HGB BLD-MCNC: 12.2 G/DL (ref 11.1–15.9)
IMM GRANULOCYTES # BLD: 0 X10E3/UL (ref 0–0.1)
IMM GRANULOCYTES NFR BLD: 0 %
LYMPHOCYTES # BLD AUTO: 2 X10E3/UL (ref 0.7–3.1)
LYMPHOCYTES NFR BLD AUTO: 24 %
MCH RBC QN AUTO: 25.5 PG (ref 26.6–33)
MCHC RBC AUTO-ENTMCNC: 32 G/DL (ref 31.5–35.7)
MCV RBC AUTO: 80 FL (ref 79–97)
MONOCYTES # BLD AUTO: 0.3 X10E3/UL (ref 0.1–0.9)
MONOCYTES NFR BLD AUTO: 4 %
NEUTROPHILS # BLD AUTO: 5.7 X10E3/UL (ref 1.4–7)
NEUTROPHILS NFR BLD AUTO: 70 %
PLATELET # BLD AUTO: 359 X10E3/UL (ref 150–379)
POTASSIUM SERPL-SCNC: 4 MMOL/L (ref 3.5–5.2)
RBC # BLD AUTO: 4.79 X10E6/UL (ref 3.77–5.28)
SODIUM SERPL-SCNC: 141 MMOL/L (ref 134–144)
WBC # BLD AUTO: 8.2 X10E3/UL (ref 3.4–10.8)

## 2018-05-18 ENCOUNTER — DOCUMENTATION ONLY (OUTPATIENT)
Dept: NEUROLOGY | Age: 48
End: 2018-05-18

## 2018-05-18 RX ORDER — SODIUM CHLORIDE 0.9 % (FLUSH) 0.9 %
5-10 SYRINGE (ML) INJECTION EVERY 8 HOURS
Status: CANCELLED | OUTPATIENT
Start: 2018-05-18

## 2018-05-18 RX ORDER — SODIUM CHLORIDE 0.9 % (FLUSH) 0.9 %
5-10 SYRINGE (ML) INJECTION AS NEEDED
Status: CANCELLED | OUTPATIENT
Start: 2018-05-18

## 2018-05-21 ENCOUNTER — HOSPITAL ENCOUNTER (OUTPATIENT)
Dept: NON INVASIVE DIAGNOSTICS | Age: 48
Discharge: HOME | End: 2018-05-21
Attending: INTERNAL MEDICINE | Admitting: INTERNAL MEDICINE
Payer: COMMERCIAL

## 2018-05-21 ENCOUNTER — TELEPHONE (OUTPATIENT)
Dept: CARDIOLOGY CLINIC | Age: 48
End: 2018-05-21

## 2018-05-21 VITALS
DIASTOLIC BLOOD PRESSURE: 88 MMHG | RESPIRATION RATE: 16 BRPM | OXYGEN SATURATION: 98 % | WEIGHT: 285 LBS | BODY MASS INDEX: 44.73 KG/M2 | HEIGHT: 67 IN | SYSTOLIC BLOOD PRESSURE: 140 MMHG | HEART RATE: 83 BPM | TEMPERATURE: 98.1 F

## 2018-05-21 PROBLEM — I10 HYPERTENSION: Status: ACTIVE | Noted: 2018-05-21

## 2018-05-21 PROBLEM — R55 SYNCOPE AND COLLAPSE: Status: ACTIVE | Noted: 2018-05-21

## 2018-05-21 PROCEDURE — 74011250636 HC RX REV CODE- 250/636: Performed by: INTERNAL MEDICINE

## 2018-05-21 PROCEDURE — 93660 TILT TABLE EVALUATION: CPT

## 2018-05-21 PROCEDURE — 77030018729 HC ELECTRD DEFIB PAD CARD -B

## 2018-05-21 RX ORDER — METOPROLOL TARTRATE 50 MG/1
50 TABLET ORAL 2 TIMES DAILY
Qty: 60 TAB | Refills: 3 | Status: SHIPPED | OUTPATIENT
Start: 2018-05-21 | End: 2018-06-21 | Stop reason: SDUPTHER

## 2018-05-21 RX ORDER — NITROGLYCERIN 0.4 MG/1
0.4 TABLET SUBLINGUAL AS NEEDED
Status: DISCONTINUED | OUTPATIENT
Start: 2018-05-21 | End: 2018-05-21 | Stop reason: HOSPADM

## 2018-05-21 RX ORDER — SODIUM CHLORIDE 0.9 % (FLUSH) 0.9 %
5-10 SYRINGE (ML) INJECTION EVERY 8 HOURS
Status: DISCONTINUED | OUTPATIENT
Start: 2018-05-21 | End: 2018-05-21 | Stop reason: HOSPADM

## 2018-05-21 RX ORDER — ATROPINE SULFATE 0.1 MG/ML
1 INJECTION INTRAVENOUS AS NEEDED
Status: DISCONTINUED | OUTPATIENT
Start: 2018-05-21 | End: 2018-05-21 | Stop reason: HOSPADM

## 2018-05-21 RX ORDER — SODIUM CHLORIDE 0.9 % (FLUSH) 0.9 %
5-10 SYRINGE (ML) INJECTION AS NEEDED
Status: DISCONTINUED | OUTPATIENT
Start: 2018-05-21 | End: 2018-05-21 | Stop reason: HOSPADM

## 2018-05-21 RX ORDER — SODIUM CHLORIDE 9 MG/ML
25 INJECTION, SOLUTION INTRAVENOUS CONTINUOUS
Status: DISCONTINUED | OUTPATIENT
Start: 2018-05-21 | End: 2018-05-21 | Stop reason: HOSPADM

## 2018-05-21 RX ADMIN — SODIUM CHLORIDE 25 ML/HR: 900 INJECTION, SOLUTION INTRAVENOUS at 11:47

## 2018-05-21 NOTE — DISCHARGE INSTRUCTIONS
Tilt Table Test Discharge Instructions:   Take additional metoprolol     Future Appointments  Date Time Provider Shereen Tam   5/24/2018 8:40 AM DO TITA Patino/ [address]   6/19/2018 8:40 AM MD Juan Medrano                   Future Appointments  Date Time Provider Shereen Tam   5/21/2018 12:00 PM CATH ROOM 3 [facility]   5/24/2018 8:40 AM DO RENETTA Patino CHERELLE SCHED   6/19/2018 8:40 AM MD Juan Medrano

## 2018-05-21 NOTE — IP AVS SNAPSHOT
[address] [place] [address] 
[phone] Patient: Geraldine Olivera MRN: [id number] :1970 About your hospitalization You were admitted on:  May 21, 2018 You last received care in the:  [address] [place] [address] You were discharged on:  May 21, 2018 Why you were hospitalized Your primary diagnosis was:  Syncope And Collapse Your diagnoses also included:  Hypertension Follow-up Information Follow up With Details Comments Contact Info Tawanda Andrade, [address] [facility] [address] 
[phone] Your Scheduled Appointments Thursday May 24, 2018  8:40 AM EDT Follow Up with DO Parmjit Arambula [facility] [address] [facility] [address]  
[phone] 2018  8:40 AM EDT  
ESTABLISHED PATIENT with Jazlyn Brush MD  
[facility] ([address]) [address] [place] [address]  [address] [place] [address]  
[phone] Discharge Orders None A check sridevi indicates which time of day the medication should be taken. My Medications START taking these medications Instructions Each Dose to Equal  
 Morning Noon Evening Bedtime  
 metoprolol tartrate 50 mg tablet Commonly known as:  LOPRESSOR Your last dose was: Your next dose is: Take 1 Tab by mouth two (2) times a day. 50 mg CONTINUE taking these medications Instructions Each Dose to Equal  
 Morning Noon Evening Bedtime  
 losartan-hydroCHLOROthiazide 100-25 mg per tablet Commonly known as:  HYZAAR Your last dose was: Your next dose is: Take 1 Tab by mouth daily. 1 Tab Where to Get Your Medications These medications were sent to [facility]/pharmacy #[id number]- [place], [address] [place] [address] [address] [place] [address] Phone:  [phone]  
  metoprolol tartrate 50 mg tablet Discharge Instructions Tilt Table Test Discharge Instructions: Take additional metoprolol Future Appointments Date Time Provider Shereen Anel 5/24/2018 8:40 AM Mcclendon Pih, DO RENETTA MONTALVOENA SCHED  
6/19/2018 8:40 AM Snow Garza  [address] Future Appointments Date Time Provider Shereen Anel 5/21/2018 12:00 PM CATH ROOM 3 [facility]  
5/24/2018 8:40 AM Mcclendon Pih, DO RENETTA MONTALVOENA SCHED  
6/19/2018 8:40 AM Snow Garza  [address] [facility] SERVICES! Elizabethdenise Clancy introduces Ooyala patient portal. Now you can access parts of your medical record, email your doctor's office, and request medication refills online. 1. In your internet browser, go to [email or web address] 2. Click on the First Time User? Click Here link in the Sign In box. You will see the New Member Sign Up page. 3. Enter your Ooyala Access Code exactly as it appears below. You will not need to use this code after youve completed the sign-up process. If you do not sign up before the expiration date, you must request a new code. · O[employer] Access Code: [email or web address] Expires: 5/24/2018  2:59 PM 
 
4. Enter the last four digits of your Social Security Number (xxxx) and Date of Birth (mm/dd/yyyy) as indicated and click Submit. You will be taken to the next sign-up page. 5. Create a Ifeelgoodst ID. This will be your Ooyala login ID and cannot be changed, so think of one that is secure and easy to remember. 6. Create a Ooyala password. You can change your password at any time. 7. Enter your Password Reset Question and Answer. This can be used at a later time if you forget your password. 8. Enter your e-mail address. You will receive e-mail notification when new information is available in [address] 9. Click Sign Up. You can now view and download portions of your medical record. 10. Click the Download Summary menu link to download a portable copy of your medical information. If you have questions, please visit the Frequently Asked Questions section of the [employer] website. Remember, [employer] is NOT to be used for urgent needs. For medical emergencies, dial 911. Now available from your iPhone and Android! Introducing Khalif Bruce As a [employer] patient, I wanted to make you aware of our electronic visit tool called Khalif Bruce. SafetySkills/Dasher allows you to connect within minutes with a medical provider 24 hours a day, seven days a week via a mobile device or tablet or logging into a secure website from your computer. You can access Khalif Bruce from anywhere in the [place]. A virtual visit might be right for you when you have a simple condition and feel like you just dont want to get out of bed, or cant get away from work for an appointment, when your regular [facility] [employer] [facility] provider is not available (evenings, weekends or holidays), or when youre out of town and need minor care. Electronic visits cost only $49 and if the SafetySkills/Dasher provider determines a prescription is needed to treat your condition, one can be electronically transmitted to a nearby pharmacy*. Please take a moment to enroll today if you have not already done so. The enrollment process is free and takes just a few minutes. To enroll, please download the SafetySkills/Dasher sandy to your tablet or phone, or visit [email or web address] to enroll on your computer.    
And, as an [address] [place] [address] patient with a [employer] account, the results of your visits will be scanned into your electronic medical record and your primary care provider will be able to view the scanned results. We urge you to continue to see your regular [employer] provider for your ongoing medical care. And while your primary care provider may not be the one available when you seek a Khalif Nievesfin virtual visit, the peace of mind you get from getting a real diagnosis real time can be priceless. For more information on Minkajanafin, view our Frequently Asked Questions (FAQs) at [email or web address]. Sincerely, 
 
Kris Mcmahon MD 
Chief Medical Officer [employer] *:  certain medications cannot be prescribed via Minkajanafin Providers Seen During Your Hospitalization Provider Specialty Primary office phone Xuan Eller MD Cardiology [phone] Your Primary Care Physician (PCP) Primary Care Physician Office Phone Office Fax Andre SAHU [phone] You are allergic to the following Allergen Reactions Decadron (Dexamethasone) Unknown (comments) Recent Documentation Height Weight Breastfeeding? BMI OB Status Smoking Status 1.702 m 129.3 kg No 44.64 kg/m2 Having regular periods Never Smoker Emergency Contacts Name Discharge Info Relation Home Work Mobile Jaron Tavera DISCHARGE CAREGIVER [3] Other Relative [6] [phone] Zoë Posadas  Other Relative [6] [phone] And,Luis  Other Relative [6] [phone] Patient Belongings The following personal items are in your possession at time of discharge: 
     Visual Aid: Glasses Please provide this summary of care documentation to your next provider. Signatures-by signing, you are acknowledging that this After Visit Summary has been reviewed with you and you have received a copy. Patient Signature:  ____________________________________________________________  Date:  ____________________________________________________________  
  
[facility]    
    
 Provider Signature:  ____________________________________________________________ Date:  ____________________________________________________________

## 2018-05-21 NOTE — TELEPHONE ENCOUNTER
Patient did not show for tilt table test this morning. Cath lab contacted patient and she states that her paperwork given to her said that her tilt table test was at 7:30pm.  Verified patient with two types of identifiers. Apologized to patient that her paperwork stating 7:30pm but that it also stated to arrive at 6:15am.  She will be there for her tilt to be done at 12:00pm per Dr Vero York. Patient verbalizes understanding. And will call with any questions or concerns.

## 2018-05-21 NOTE — IP AVS SNAPSHOT
[address] [facility] [address] 
[phone] Patient: Ashlyn Tyler MRN: [id number] :1970 A check sridevi indicates which time of day the medication should be taken. My Medications START taking these medications Instructions Each Dose to Equal  
 Morning Noon Evening Bedtime  
 metoprolol tartrate 50 mg tablet Commonly known as:  LOPRESSOR Your last dose was: Your next dose is: Take 1 Tab by mouth two (2) times a day. 50 mg CONTINUE taking these medications Instructions Each Dose to Equal  
 Morning Noon Evening Bedtime  
 losartan-hydroCHLOROthiazide 100-25 mg per tablet Commonly known as:  HYZAAR Your last dose was: Your next dose is: Take 1 Tab by mouth daily. 1 Tab Where to Get Your Medications These medications were sent to [facility]pharmacy [id number], [address] [place] [address]., [address] [place] [address] Phone:  [phone]  
  metoprolol tartrate 50 mg tablet

## 2018-05-21 NOTE — PROCEDURES
Cardiac Procedure Note   Patient: Ansley Messina  MRN: [id number]  SSN: [id number]   YOB: 1970 Age: 49 y.o.   Sex: female    Date of Procedure: 5/21/2018   Pre-procedure Diagnosis: recurrent syncope, mental status change, hypertension  Morbid obesity  Post-procedure Diagnosis: same  Procedure: Tilt Table Study  :  Dr. Natty Jimenez MD    Assistant(s):  None  Anesthesia: none  Estimated Blood Loss: none  Specimens Removed: None  Findings: high BP when she said she wanted to pass out after 1 sl nitro  Complications: None   Implants:  None  Signed by:  Natty Jimenez MD  5/21/2018  12:19 PM

## 2018-05-21 NOTE — ROUTINE PROCESS
Cardiac Cath Lab Recovery Arrival Note:      Natalie Ash arrived to Cardiac Cath Lab, Recovery Area. Staff introduced to patient. Patient identifiers verified with NAME and DATE OF BIRTH. Procedure verified with patient. Consent forms reviewed and signed by patient or authorized representative and verified. Allergies verified. Patient and family oriented to department. Patient and family informed of procedure and plan of care. Questions answered with review. Patient prepped for procedure, per orders from physician, prior to arrival.    Patient on cardiac monitor, non-invasive blood pressure, SPO2 monitor. On RA. Patient is A&Ox 4. Patient reports no pain. Patient in stretcher, in low position, with side rails up, call bell within reach, patient instructed to call if assistance as needed. Patient prep in: [address], [place]. Patient family has pager #   Family in: . Prep by: VINAY Preciado RN

## 2018-05-21 NOTE — H&P (VIEW-ONLY)
Cardiac Electrophysiology OFFICE Consultation Note     Subjective:      Ansley Messina is a 49 y.o. patient who is seen for evaluation of syncope   Dr Ethan Lilly referred her  EEG was negative with neurologist  Family hx has cancer and CAD and MI but no sudden death  She has had syncope all her life with sitting and standing  She has no prodrome  Last episode in Feb 2018 was sitting. She was hearing things around her but could not have strength to get back up  She had echo with normal LVEF and mild to moderate LVH    Patient Active Problem List   Diagnosis Code    Altered mental status, unspecified R41.82    Convulsion ([facility] R56.9    Obesity, morbid ([facility] [address] [facility] E66.01     Current Outpatient Prescriptions   Medication Sig Dispense Refill    losartan-hydroCHLOROthiazide (HYZAAR) 100-25 mg per tablet Take 1 Tab by mouth daily. Allergies   Allergen Reactions    Decadron [Dexamethasone] Unknown (comments)     Past Medical History:   Diagnosis Date    Heart murmur     Hypertension     Ill-defined condition     pseudo seizure     Past Surgical History:   Procedure Laterality Date    HX GYN      \"cyst removal\"    HX SEPTOPLASTY      HX TONSIL AND ADENOIDECTOMY       No family history of syncope  Social History   Substance Use Topics    Smoking status: Never Smoker    Smokeless tobacco: Never Used    Alcohol use No        Review of Systems:   Constitutional: Negative for fever, chills, weight loss, malaise/fatigue. HEENT: Negative for nosebleeds, vision changes. Respiratory: Negative for cough, hemoptysis  Cardiovascular: Negative for chest pain, palpitations, orthopnea, claudication, leg swelling, + syncope, and no PND. Gastrointestinal: Negative for nausea, vomiting, diarrhea, blood in stool and melena. Genitourinary: Negative for dysuria, and hematuria. GYN usually heavy menstrual cycle and could trigger syncope  Musculoskeletal: Negative for myalgias, arthralgia. Skin: Negative for rash. Heme: Does not bleed or bruise easily. Neurological: Negative for speech change and focal weakness     Objective:     Visit Vitals    /82 (BP 1 Location: Left arm, BP Patient Position: Sitting)    Pulse 78    Ht 5' 7\" (1.702 m)    Wt 281 lb (127.5 kg)    BMI 44.01 kg/m2      Physical Exam:   Constitutional: well-developed and well-nourished. No respiratory distress. Head: Normocephalic and atraumatic. Eyes: Pupils are equal, round  ENT: hearing normal  Neck: supple. No JVD present. Cardiovascular: Normal rate, regular rhythm. Exam reveals no gallop and no friction rub. No murmur heard. Pulmonary/Chest: Effort normal and breath sounds normal. No wheezes. Abdominal: Soft, obese  Musculoskeletal: no edema. Neurological: alert,oriented. Skin: Skin is warm and dry  Psychiatric: normal mood and affect. Behavior is normal. Judgment and thought content normal.      EKG: normal sinus rhythm. Assessment/Plan:       ICD-10-CM ICD-9-CM    1. Syncope, unspecified syncope type R55 780.2    2. Obesity, morbid ([address] E66.01 278.01    3. Essential hypertension I10 401.9      reviewed medications and side effects in detail   She likely has neurocardiogenic syncope  BP may be issue and need to change to beta blocker  She agrees to tilt test and possible change of medication    Thank you for involving me in this patient's care and please call with further concerns or questions. Shayan Wallace M.D.   Electrophysiology/Cardiology  [facility] [place]  [address], [address] [facility], [address]  [phone]

## 2018-05-21 NOTE — INTERVAL H&P NOTE
H&P Update:  Moira Holland was seen and examined. History and physical has been reviewed. The patient has been examined.  There have been no significant clinical changes since the completion of the originally dated History and Physical.    Signed By: Gabbie Medina MD     May 21, 2018 11:52 AM

## 2018-05-21 NOTE — PROGRESS NOTES
TRANSFER - IN REPORT:    Verbal report received from [place] RT on Chester Stanley  being received from cath lab procedure area  for routine progression of care. Report consisted of patients Situation, Background, Assessment and Recommendations(SBAR). Information from the following report(s) Kardex and Procedure Summary was reviewed with the receiving clinician. Opportunity for questions and clarification was provided. Assessment completed upon patients arrival to [address] [place] [address] and care assumed. Cardiac Cath Lab Recovery Arrival Note:    Chester Stanley arrived to [facility] recovery area. Patient procedure= Tilt Table. Patient on cardiac monitor, non-invasive blood pressure, SPO2 monitor. On RA . IV  of NS on pump at 25 ml/hr. Patient status doing well without problems. Patient is A&Ox 3. Patient reports no pain. PROCEDURE SITE CHECK:    Procedure site:  None     No change in patient status. Continue to monitor patient and status.

## 2018-05-22 NOTE — PROCEDURES
DATE of PROCEDURE: 5/21/2018   Head-up tilt-table test with and without sublingual nitroglycerin. HISTORY:  This is a 49 y. o.woman with a history of recurrent syncope. She is morbidly obese and have essential hypertension. The patient was explained the risks and benefits of the head-up tilt-table test and she agreed to proceed. PROCEDURE IN DETAIL:  After informed written consent was obtained, the patient was brought to the Electrophysiology Suite where she was laid on the tilt table. The patient was strapped down for her safety and she had the baseline blood pressure 134/83, heart rate of 89 beats per minute. The patient was then tilted up to 70 degrees. During the baseline tilt test she was asymptomatic and the blood pressure was 169/96 and heart rate were 88 bpm sinus rhythm and therefore nitroglycerin 0.4 mg sublingually was given and her heart rate increased to 113 beats per minute. After the nitroglycerin, her blood pressure was actually increased to 180/113 but she complained about feeling near syncope. The patient did not actually have syncopal episode, at the end, her blood pressure was 170/96 and heart rate was 106 beats per minute when she was returned to the supine position. Specimen removed: NONE    ASSESSMENT AND PLAN:  The patient had a negative tilt table test.  She appeared to be symptomatic because of severe hypertension. I add metoprolol to her antihypertensive medication.   She should consider losing weight

## 2018-05-24 ENCOUNTER — OFFICE VISIT (OUTPATIENT)
Dept: NEUROLOGY | Age: 48
End: 2018-05-24

## 2018-05-24 VITALS
OXYGEN SATURATION: 98 % | DIASTOLIC BLOOD PRESSURE: 88 MMHG | WEIGHT: 287 LBS | HEART RATE: 75 BPM | SYSTOLIC BLOOD PRESSURE: 136 MMHG | BODY MASS INDEX: 44.95 KG/M2 | RESPIRATION RATE: 18 BRPM

## 2018-05-24 DIAGNOSIS — F48.8 PSYCHOGENIC SYNCOPE: ICD-10-CM

## 2018-05-24 DIAGNOSIS — R56.9 PSEUDOSEIZURES (HCC): Primary | ICD-10-CM

## 2018-05-24 DIAGNOSIS — I10 HYPERTENSION, UNSPECIFIED TYPE: ICD-10-CM

## 2018-05-24 NOTE — PROGRESS NOTES
Neurology Clinic Follow up Note    Patient ID:  Jamaica Dunham  [id number]  24 y.o.  1970      Ms. Daren Riley is here for follow up today of seizure like activity. Last Appointment With Me:  2018     Right handed female presenting for evaluation of possible seizures. Seizure activity described as: Seen in ED 18 due to reported \"pseudoseizure\" via EMS from work. She was sitting at a meeting and suddenly slumped forward in her chair with eye fluttering and jerking of extremities. Pt reported feeling diaphoretic and generally weak per ED records along with severe headache, LUE/LLE numbness. No lost consciousness- \"felt like a dream, couldn't move/write or answer questions\" x several hours. She states her speech is still stuttering in quality since the episode. No post event confusion or excessive fatigue. She was discharged after a few hours. No associated urinary incontinence or tongue biting. She endorses a h/o pseudoseizures dx at [facility] . Events dating back 25 years. Events typically occur during her menstruation. Current seizure frequency is: yearly in her 20's/30's. In her 42's, less frequent. Last episode prior to most recent was . Seizure RF: no FHx seizure, no h/o significant head injury, no febrile seizures or CNS infections  Last seizure-like event:  18  Current AEDs: None  Prior EE Molly Vu   [facility] neuro[facility]  Interval History:   No recurrence of syncope or seizure like activity since last visit. She has cut back on her work load and feels less stressed. [facility] records reviewed in detail. EEG captured spells of shaking without associated epileptiform activity. Dx pseudoseizure/conversion d/o. MRI Brain also performed at that time and without acute process. Rpt sleep deprived EEG here also normal.    Underwent recent TILT table test/TTE which did not reveal significant findings. PMHx/ PSHx/ FHx/ SHx:  Reviewed and unchanged previous visit. Past Medical History:   Diagnosis Date    Heart murmur     Hypertension     Ill-defined condition     pseudo seizure       ROS:  Comprehensive review of systems negative except for as noted above. Objective:       Meds:  Current Outpatient Prescriptions   Medication Sig Dispense Refill    metoprolol tartrate (LOPRESSOR) 50 mg tablet Take 1 Tab by mouth two (2) times a day. 60 Tab 3    losartan-hydroCHLOROthiazide (HYZAAR) 100-25 mg per tablet Take 1 Tab by mouth daily. Exam:  Visit Vitals    /88    Pulse 75    Resp 18    Wt 130.2 kg (287 lb)    SpO2 98%    BMI 44.95 kg/m2     NEUROLOGICAL EXAM:  General: Awake, alert, speech fluent  CN: PERRL, EOMI without nystagmus, VFF to confrontation, facial sensation and strength are normal and symmetric, hearing is intact to finger rub bilaterally, palate and tongue movements are intact and symmetric. Motor: Normal tone, bulk and strength bilaterally. Reflexes: 2/4 and symmetric, plantar stimulation is flexor. Coordination: FNF, ZEE, HTS intact. Sensation: LT intact throughout. Gait: Normal-based and steady. LABS  Results for orders placed or performed in visit on 05/15/18   CBC WITH AUTOMATED DIFF   Result Value Ref Range    WBC 8.2 3.4 - 10.8 x10E3/uL    RBC 4.79 3.77 - 5.28 x10E6/uL    HGB 12.2 11.1 - 15.9 g/dL    HCT 38.1 34.0 - 46.6 %    MCV 80 79 - 97 fL    MCH 25.5 (L) 26.6 - 33.0 pg    MCHC 32.0 31.5 - 35.7 g/dL    RDW 14.9 12.3 - 15.4 %    PLATELET 610 429 - 403 x10E3/uL    NEUTROPHILS 70 Not Estab. %    Lymphocytes 24 Not Estab. %    MONOCYTES 4 Not Estab. %    EOSINOPHILS 2 Not Estab. %    BASOPHILS 0 Not Estab. %    ABS. NEUTROPHILS 5.7 1.4 - 7.0 x10E3/uL    Abs Lymphocytes 2.0 0.7 - 3.1 x10E3/uL    ABS. MONOCYTES 0.3 0.1 - 0.9 x10E3/uL    ABS. EOSINOPHILS 0.2 0.0 - 0.4 x10E3/uL    ABS.  BASOPHILS 0.0 0.0 - 0.2 x10E3/uL    IMMATURE GRANULOCYTES 0 Not Estab. % ABS. IMM. GRANS. 0.0 0.0 - 0.1 [id number]/KI   METABOLIC PANEL, BASIC   Result Value Ref Range    Glucose 97 65 - 99 mg/dL    BUN 12 6 - 24 mg/dL    Creatinine 0.70 0.57 - 1.00 mg/dL    GFR est non- >59 mL/min/1.73    GFR est  >59 mL/min/1.73    BUN/Creatinine ratio 17 9 - 23    Sodium 141 134 - 144 mmol/L    Potassium 4.0 3.5 - 5.2 mmol/L    Chloride 101 96 - 106 mmol/L    CO2 22 18 - 29 mmol/L    Calcium 9.0 8.7 - 10.2 mg/dL       IMAGING:  MRI Results (most recent):  No results found for this or any previous visit. Assessment:     Encounter Diagnoses     ICD-10-CM ICD-9-CM   1. 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 Hypertension, unspecified type I10 36.9     49year old AAF here for f/u of 25+ year history of seizure-like activity with decreasing overall frequency occasionally associated with prolonged neurologic sequela (immobility, weakness, stuttering). Most recent event 2/23/18 occurring while at work with observed extremity jerking, non-responsiveness without LOC and prolonged post-event stuttering prompting ED evaluation. Examination is presently non-focal.  Presentation appears most c/w with PNES/pseudoseizures. Prior extensive investigations 2006 at [facility] with normal w/u including EEG which captured spells and MRI Brain, dx of pseudoseizures. Sleep-deprived EEG repeated here also normal.    No further events since 2/23/18. She appears to be doing well with decreased work stress. Discussed referral to psychiatry should these spells return. Plan:   She was advised to return for any new/worsening sx. Follow-up Disposition:  Return if symptoms worsen or fail to improve.     Signed:  James Jackson, DO  5/24/2018

## 2018-05-24 NOTE — MR AVS SNAPSHOT
[facility] [address] 
[phone] Patient: Toya Abebe MRN: [id number] :1970 Visit Information Date & Time Provider Department Dept. Phone Encounter #  
 2018  8:40 AM Alcon Willingham DO [facility] at [address] [id number] Follow-up Instructions Return if symptoms worsen or fail to improve. Your Appointments 2018  8:40 AM  
ESTABLISHED PATIENT with Xochilt Nash MD  
[facility] ([address]) Appt Note: 4 week tilt table test f/u  
 [address] [facility] [address] [place] [address] [address] [facility] [address] Upcoming Health Maintenance Date Due DTaP/Tdap/Td series (1 - Tdap) 1990 PAP AKA CERVICAL CYTOLOGY 1990 Influenza Age 5 to Adult 2018 Allergies as of 2018  Review Complete On: 2018 By: Alcon Willingham DO Severity Noted Reaction Type Reactions Decadron [Dexamethasone]  2014    Unknown (comments) Current Immunizations  Never Reviewed No immunizations on file. Not reviewed this visit You Were Diagnosed With   
  
 Codes Comments Pseudoseizures    -  Primary ICD-10-CM: F44.5 ICD-9-CM: 780.39 Vitals BP Pulse Resp Weight(growth percentile) SpO2 BMI  
 136/88 75 18 287 lb (130.2 kg) 98% 44.95 kg/m2 OB Status Smoking Status Having regular periods Never Smoker Vitals History BMI and BSA Data Body Mass Index Body Surface Area 44.95 kg/m 2 2.48 m 2 Preferred Pharmacy Pharmacy Name Phone [facility], [address] [place] [address] [address] [phone] Your Updated Medication List  
  
   
This list is accurate as of 18  9:04 AM.  Always use your most recent med list.  
  
  
  
  
 losartan-hydroCHLOROthiazide 100-25 mg per tablet Commonly known as:  HYZAAR Take 1 Tab by mouth daily. metoprolol tartrate 50 mg tablet Commonly known as:  LOPRESSOR Take 1 Tab by mouth two (2) times a day. Follow-up Instructions Return if symptoms worsen or fail to improve. Patient Instructions A Healthy Lifestyle: Care Instructions Your Care Instructions A healthy lifestyle can help you feel good, stay at a healthy weight, and have plenty of energy for both work and play. A healthy lifestyle is something you can share with your whole family. A healthy lifestyle also can lower your risk for serious health problems, such as high blood pressure, heart disease, and diabetes. You can follow a few steps listed below to improve your health and the health of your family. Follow-up care is a key part of your treatment and safety. Be sure to make and go to all appointments, and call your doctor if you are having problems. It's also a good idea to know your test results and keep a list of the medicines you take. How can you care for yourself at home? · Do not eat too much sugar, fat, or fast foods. You can still have dessert and treats now and then. The goal is moderation. · Start small to improve your eating habits. Pay attention to portion sizes, drink less juice and soda pop, and eat more fruits and vegetables. ¨ Eat a healthy amount of food. A 3-ounce serving of meat, for example, is about the size of a deck of cards. Fill the rest of your plate with vegetables and whole grains. ¨ Limit the amount of soda and sports drinks you have every day. Drink more water when you are thirsty. ¨ Eat at least 5 servings of fruits and vegetables every day. It may seem like a lot, but it is not hard to reach this goal. A serving or helping is 1 piece of fruit, 1 cup of vegetables, or 2 cups of leafy, raw vegetables. Have an apple or some carrot sticks as an afternoon snack instead of a candy bar. Try to have fruits and/or vegetables at every meal. 
· Make exercise part of your daily routine. You may want to start with simple activities, such as walking, bicycling, or slow swimming. Try to be active 30 to 60 minutes every day. You do not need to do all 30 to 60 minutes all at once. For example, you can exercise 3 times a day for 10 or 20 minutes. Moderate exercise is safe for most people, but it is always a good idea to talk to your doctor before starting an exercise program. 
· Keep moving. [place] the lawn, work in the garden, or [employer]. Take the stairs instead of the elevator at work. · If you smoke, quit. People who smoke have an increased risk for heart attack, stroke, cancer, and other lung illnesses. Quitting is hard, but there are ways to boost your chance of quitting tobacco for good. ¨ Use nicotine gum, patches, or lozenges. ¨ Ask your doctor about stop-smoking programs and medicines. ¨ Keep trying. In addition to reducing your risk of diseases in the future, you will notice some benefits soon after you stop using tobacco. If you have shortness of breath or asthma symptoms, they will likely get better within a few weeks after you quit. · Limit how much alcohol you drink. Moderate amounts of alcohol (up to 2 drinks a day for men, 1 drink a day for women) are okay. But drinking too much can lead to liver problems, high blood pressure, and other health problems. Family health If you have a family, there are many things you can do together to improve your health. · Eat meals together as a family as often as possible. · Eat healthy foods. This includes fruits, vegetables, lean meats and dairy, and whole grains. · Include your family in your fitness plan.  Most people think of activities such as jogging or tennis as the way to fitness, but there are many ways you and your family can be more active. Anything that makes you breathe hard and gets your heart pumping is exercise. Here are some tips: 
¨ Walk to do errands or to take your child to school or the bus. ¨ Go for a family bike ride after dinner instead of watching TV. Where can you learn more? Go to http://chapo-ema.info/. Enter D951 in the search box to learn more about \"A Healthy Lifestyle: Care Instructions. \" Current as of: May 12, 2017 Content Version: 11.4 © 9082-9756 [employer]. Care instructions adapted under license by [employer] (which disclaims liability or warranty for this information). If you have questions about a medical condition or this instruction, always ask your healthcare professional. [address] any warranty or liability for your use of this information. Introducing [facility]! Fermin Adams introduces [employer] patient portal. Now you can access parts of your medical record, email your doctor's office, and request medication refills online. 1. In your internet browser, go to [email or web address] 2. Click on the First Time User? Click Here link in the Sign In box. You will see the New Member Sign Up page. 3. Enter your [employer] Access Code exactly as it appears below. You will not need to use this code after youve completed the sign-up process. If you do not sign up before the expiration date, you must request a new code. · [employer] Access Code: [email or web address] Expires: 5/24/2018  2:59 PM 
 
4. Enter the last four digits of your Social Security Number (xxxx) and Date of Birth (mm/dd/yyyy) as indicated and click Submit. You will be taken to the next sign-up page. 5. Create a [employer] ID. This will be your [employer] login ID and cannot be changed, so think of one that is secure and easy to remember. 6. Create a [employer] password. You can change your password at any time. 7. Enter your Password Reset Question and Answer. This can be used at a later time if you forget your password. 8. Enter your e-mail address. You will receive e-mail notification when new information is available in [address] 9. Click Sign Up. You can now view and download portions of your medical record. 10. Click the Download Summary menu link to download a portable copy of your medical information. If you have questions, please visit the Frequently Asked Questions section of the [employer] website. Remember, [employer] is NOT to be used for urgent needs. For medical emergencies, dial 911. Now available from your iPhone and Android! Please provide this summary of care documentation to your next provider. Your primary care clinician is listed as Aleena New. If you have any questions after today's visit, please call [phone].

## 2018-05-24 NOTE — PATIENT INSTRUCTIONS

## 2018-06-19 ENCOUNTER — OFFICE VISIT (OUTPATIENT)
Dept: CARDIOLOGY CLINIC | Age: 48
End: 2018-06-19

## 2018-06-19 VITALS
DIASTOLIC BLOOD PRESSURE: 76 MMHG | HEART RATE: 60 BPM | SYSTOLIC BLOOD PRESSURE: 122 MMHG | WEIGHT: 285 LBS | BODY MASS INDEX: 44.73 KG/M2 | HEIGHT: 67 IN

## 2018-06-19 DIAGNOSIS — R55 SYNCOPE, UNSPECIFIED SYNCOPE TYPE: Primary | ICD-10-CM

## 2018-06-19 DIAGNOSIS — E66.01 OBESITY, MORBID (HCC): ICD-10-CM

## 2018-06-19 DIAGNOSIS — I51.7 LVH (LEFT VENTRICULAR HYPERTROPHY): ICD-10-CM

## 2018-06-19 DIAGNOSIS — I10 ESSENTIAL HYPERTENSION: ICD-10-CM

## 2018-06-19 NOTE — PROGRESS NOTES
Cardiac Electrophysiology OFFICE Note     Subjective:      Lexie Mcclendon is a 49 y.o. patient who is seen for follow up after negative tilt test 4 weeks ago. She was initially referred by Dr. Satya Rogers for syncope. Hypertension was noted to correlate with symptoms during tilt test.  Metoprolol was initiated. She was told to continue losartan. BP improved today (122/76). Patient states this is consistent with home checks. She is tolerating beta blocker well. Some fatigue if she does too much at work, also noted some ankle swelling in the last few days, states AC was broken at work & she believes that's the cause. No further syncope, lightheadness/dizziness, chest pain, or palpitations. Previous:  EEG was negative with neurologist.    Episodes of syncope all her life with sitting and standing, no prodrome. Last episode 02/2018. She had echo with normal LVEF and mild to moderate LVH. Patient Active Problem List   Diagnosis Code    Altered mental status, unspecified R41.82    Convulsion ([facility] [address] R56.9    Obesity, morbid ([facility] [address] E66.01    Syncope and collapse R55    Hypertension I10     Current Outpatient Prescriptions   Medication Sig Dispense Refill    metoprolol tartrate (LOPRESSOR) 50 mg tablet Take 1 Tab by mouth two (2) times a day. 60 Tab 3    losartan-hydroCHLOROthiazide (HYZAAR) 100-25 mg per tablet Take 1 Tab by mouth daily.        Allergies   Allergen Reactions    Decadron [Dexamethasone] Unknown (comments)     Past Medical History:   Diagnosis Date    Heart murmur     Hypertension     Ill-defined condition     pseudo seizure     Past Surgical History:   Procedure Laterality Date    HX GYN      \"cyst removal\"    HX SEPTOPLASTY      HX TONSIL AND ADENOIDECTOMY      TILT TABLE EVAL W/WO DRUG  5/21/2018          No family history of syncope  Social History   Substance Use Topics    Smoking status: Never Smoker    Smokeless tobacco: Never Used    Alcohol use No Review of Systems:   Constitutional: Negative for fever, chills, weight loss, malaise/fatigue. HEENT: Negative for nosebleeds, vision changes. Respiratory: Negative for cough, hemoptysis  Cardiovascular: Negative for chest pain, palpitations, orthopnea, claudication, + recent leg swelling, no syncope, and no PND. Gastrointestinal: Negative for nausea, vomiting, diarrhea, blood in stool and melena. Genitourinary: Negative for dysuria, and hematuria. GYN usually heavy menstrual cycle and could trigger syncope  Musculoskeletal: Negative for myalgias, arthralgia. Skin: Negative for rash. Heme: Does not bleed or bruise easily. Neurological: Negative for speech change and focal weakness     Objective:     Visit Vitals    /76 (BP 1 Location: Left arm, BP Patient Position: Sitting)    Pulse 60    Ht 5' 7\" (1.702 m)    Wt 285 lb (129.3 kg)    BMI 44.64 kg/m2      Physical Exam:   Constitutional: well-developed and well-nourished. No respiratory distress. Head: Normocephalic and atraumatic. Eyes: Pupils are equal, round  ENT: hearing normal  Neck: supple. No JVD present. Cardiovascular: Normal rate, regular rhythm. Exam reveals no gallop and no friction rub. No murmur heard. Pulmonary/Chest: Effort normal and breath sounds normal. No wheezes. Abdominal: Soft, obese. Musculoskeletal: Trace to 1+ nonpitting ankle & pedal edema bilaterally. .   Neurological: alert,oriented. Skin: Skin is warm and dry  Psychiatric: normal mood and affect. Behavior is normal. Judgment and thought content normal.        Assessment/Plan:       ICD-10-CM ICD-9-CM    1. Syncope, unspecified syncope type R55 780.2    2. Essential hypertension I10 401.9    3. Obesity, morbid ([facility] [address] E66.01 278.01    4. LVH (left ventricular hypertrophy) I51.7 429.3      Reviewed medications and side effects in detail. Tolerating addition of metoprolol well. No further syncope or near syncope. BP has been better controlled. Continue with current medication regimen as prescribed. Follow up with Dr. Shabbir Diaz in 6 months and see me PRN    Follow-up Disposition:  Return in about 6 months (around 12/19/2018). Future Appointments  Date Time Provider Shereen Anel   12/18/2018 8:20 AM Sam Golden  [address]           Thank you for involving me in this patient's care and please call with further concerns or questions. Scot King M.D.   Electrophysiology/Cardiology  [facility] [place]  [facility], [facility] [address], [address]  [phone]

## 2018-06-19 NOTE — MR AVS SNAPSHOT
[address] [facility] [address] [facility] 
[phone] Patient: Gamal Street MRN: [id number] :1970 Visit Information Date & Time Provider Department Dept. Phone Encounter #  
 2018  8:40 AM Juan Carlos Gonzalez [facility] Reeves [phone] [id number] Upcoming Health Maintenance Date Due DTaP/Tdap/Td series (1 - Tdap) 1990 PAP AKA CERVICAL CYTOLOGY 1990 Influenza Age 5 to Adult 2018 Allergies as of 2018  Review Complete On: 2018 By: Juan Carlos Gonzalez MD  
  
 Severity Noted Reaction Type Reactions Decadron [Dexamethasone]  2014    Unknown (comments) Current Immunizations  Never Reviewed No immunizations on file. Not reviewed this visit You Were Diagnosed With   
  
 Codes Comments Syncope, unspecified syncope type    -  Primary ICD-10-CM: R55 
ICD-9-CM: 780.2 Essential hypertension     ICD-10-CM: I10 
ICD-9-CM: 401.9 Vitals BP Pulse Height(growth percentile) Weight(growth percentile) BMI OB Status 122/76 (BP 1 Location: Left arm, BP Patient Position: Sitting) 60 5' 7\" (1.702 m) 285 lb (129.3 kg) 44.64 kg/m2 Having regular periods Smoking Status Never Smoker Vitals History BMI and BSA Data Body Mass Index Body Surface Area  
 44.64 kg/m 2 2.47 m 2 Preferred Pharmacy Pharmacy Name Phone [id number] [facility] [phone] Your Updated Medication List  
  
   
This list is accurate as of 18  9:03 AM.  Always use your most recent med list.  
  
  
  
  
 losartan-hydroCHLOROthiazide 100-25 mg per tablet Commonly known as:  HYZAAR Take 1 Tab by mouth daily. metoprolol tartrate 50 mg tablet Commonly known as:  LOPRESSOR Take 1 Tab by mouth two (2) times a day. Introducing [facility]! Wilfrido Dorman introduces [employer] patient portal. Now you can access parts of your medical record, email your doctor's office, and request medication refills online. 1. In your internet browser, go to [email or web address] 2. Click on the First Time User? Click Here link in the Sign In box. You will see the New Member Sign Up page. 3. Enter your [employer] [facility] Access Code exactly as it appears below. You will not need to use this code after youve completed the sign-up process. If you do not sign up before the expiration date, you must request a new code. · [employer] [facility] Access Code: [email or web address] [facility] Expires: 9/16/2018  5:20 AM 
 
4. Enter the last four digits of your Social Security Number (xxxx) and Date of Birth (mm/dd/yyyy) as indicated and click Submit. You will be taken to the next sign-up page. 5. Create a [facility] [facility] ID. This will be your [facility] [facility] login ID and cannot be changed, so think of one that is secure and easy to remember. 6. Create a [employer] [facility] password. You can change your password at any time. 7. Enter your Password Reset Question and Answer. This can be used at a later time if you forget your password. 8. Enter your e-mail address. You will receive e-mail notification when new information is available in [address] 9. Click Sign Up. You can now view and download portions of your medical record. 10. Click the Download Summary menu link to download a portable copy of your medical information. If you have questions, please visit the Frequently Asked Questions section of the [employer] website. Remember, [employer] [facility] is NOT to be used for urgent needs. For medical emergencies, dial 911. Now available from your iPhone and Android! Please provide this summary of care documentation to your next provider. Your primary care clinician is listed as Andrew Masters. If you have any questions after today's visit, please call [phone].

## 2018-06-21 RX ORDER — METOPROLOL TARTRATE 50 MG/1
50 TABLET ORAL 2 TIMES DAILY
Qty: 180 TAB | Refills: 1 | Status: SHIPPED | OUTPATIENT
Start: 2018-06-21 | End: 2020-09-11

## 2018-06-21 NOTE — TELEPHONE ENCOUNTER
Request for 90 day supply of Metoprolol Tartrate 50 mg BID. Last office visit 6/19/18, next office visit not yet scheduled. Refills per verbal order from Dr. Ian Smith.

## 2018-08-27 ENCOUNTER — HOSPITAL ENCOUNTER (OUTPATIENT)
Dept: MAMMOGRAPHY | Age: 48
Discharge: HOME | End: 2018-08-27
Payer: COMMERCIAL

## 2018-08-27 DIAGNOSIS — Z12.31 VISIT FOR SCREENING MAMMOGRAM: ICD-10-CM

## 2018-08-27 PROCEDURE — 77063 BREAST TOMOSYNTHESIS BI: CPT

## 2018-11-27 RX ORDER — LOSARTAN POTASSIUM AND HYDROCHLOROTHIAZIDE 25; 100 MG/1; MG/1
1 TABLET ORAL DAILY
Qty: 30 TAB | Refills: 3 | Status: SHIPPED | OUTPATIENT
Start: 2018-11-27 | End: 2019-03-21 | Stop reason: SDUPTHER

## 2019-01-11 ENCOUNTER — OFFICE VISIT (OUTPATIENT)
Dept: CARDIOLOGY CLINIC | Age: 49
End: 2019-01-11

## 2019-01-11 VITALS
HEIGHT: 67 IN | DIASTOLIC BLOOD PRESSURE: 80 MMHG | BODY MASS INDEX: 44.42 KG/M2 | RESPIRATION RATE: 16 BRPM | HEART RATE: 67 BPM | OXYGEN SATURATION: 98 % | SYSTOLIC BLOOD PRESSURE: 120 MMHG | WEIGHT: 283 LBS

## 2019-01-11 DIAGNOSIS — I10 HYPERTENSION, UNSPECIFIED TYPE: Primary | ICD-10-CM

## 2019-01-11 RX ORDER — ERGOCALCIFEROL 1.25 MG/1
CAPSULE ORAL
Refills: 1 | COMMUNITY
Start: 2018-12-18 | End: 2019-11-29

## 2019-01-11 RX ORDER — CLOBETASOL PROPIONATE 0.5 MG/G
CREAM TOPICAL 2 TIMES DAILY
COMMUNITY
End: 2019-11-29

## 2019-01-11 RX ORDER — ATORVASTATIN CALCIUM 40 MG/1
TABLET, FILM COATED ORAL
Refills: 0 | COMMUNITY
Start: 2018-10-30 | End: 2020-01-27 | Stop reason: SDUPTHER

## 2019-01-11 RX ORDER — ERYTHROMYCIN 5 MG/G
3.5 OINTMENT OPHTHALMIC 2 TIMES DAILY
COMMUNITY
End: 2019-03-15

## 2019-01-11 NOTE — PROGRESS NOTES
Visit Vitals  /80 (BP 1 Location: Left arm, BP Patient Position: Sitting)   Pulse 67   Resp 16   Ht 5' 7\" (1.702 m)   Wt 283 lb (128.4 kg)   SpO2 98%   BMI 44.32 kg/m²

## 2019-01-11 NOTE — PROGRESS NOTES
HISTORY OF PRESENT ILLNESS  Verónica Cordova is a 51 y.o. female. 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 Ejection fraction was estimated to be 60 %. There were no regional  wall motion abnormalities. There was mild to moderate concentric  hypertrophy. Mitral valve: No echocardiographic evidence for prolapse. Negative HUT on 5/18 but high bp correlating with symptoms bblockers started  EEG negative in 2018       Past Medical History:   Diagnosis Date    Heart murmur      Hypertension      Ill-defined condition       pseudo seizure            Past Surgical History:   Procedure Laterality Date    HX GYN         \"cyst removal\"    HX SEPTOPLASTY        HX TONSIL AND ADENOIDECTOMY               Social History   Substance Use Topics    Smoking status: Never Smoker    Smokeless tobacco: Never Used    Alcohol use No      History reviewed. No pertinent family history. HPI  She is feeling much better   no recurrent \" syncope\"  She has changed her life style taking more time for herself and work as not as stressful as it was in the past  Review of Systems   Constitutional: Negative. Respiratory: Negative. Cardiovascular: Negative. Neurological: Negative. Physical Exam  Physical Exam   Blood pressure 120/80, pulse 67, resp. rate 16, height 5' 7\" (1.702 m), weight 128.4 kg (283 lb), SpO2 98 %. Constitutional: She is oriented to person, place, and time. She appears well-developed and well-nourished. No distress. HENT: Head: Normocephalic. Eyes: No scleral icterus. Neck: Normal range of motion. Neck supple. No JVD present. No tracheal deviation present. Cardiovascular: Normal rate, regular rhythm, normal heart sounds and intact distal pulses. Exam reveals no gallop and no friction rub. No murmur heard. Pulmonary/Chest: Effort normal and breath sounds normal. No stridor.  No respiratory distress, wheezes or  rales. Abdominal: She exhibits no distension. Musculoskeletal: She exhibits no edema. Neurological: She is alert and oriented to person, place, and time. Coordination normal.   Skin: Skin is warm. No rash noted. Not diaphoretic. No erythema. Psychiatric:  Normal mood and affect. Behavior is normal.   Current Outpatient Medications on File Prior to Visit   Medication Sig Dispense Refill    atorvastatin (LIPITOR) 40 mg tablet TAKE 1 TABLET BY MOUTH EVERYDAY AT BEDTIME  0    ergocalciferol (ERGOCALCIFEROL) 50,000 unit capsule TAKE 1 CAPSULE BY MOUTH ONCE WEEKLY  1    clobetasol (TEMOVATE) 0.05 % topical cream Apply  to affected area two (2) times a day.  erythromycin (ILOTYCIN) ophthalmic ointment Administer 3.5 g to left eye two (2) times a day.  losartan-hydroCHLOROthiazide (HYZAAR) 100-25 mg per tablet Take 1 Tab by mouth daily. 30 Tab 3    metoprolol tartrate (LOPRESSOR) 50 mg tablet Take 1 Tab by mouth two (2) times a day. 180 Tab 1     No current facility-administered medications on file prior to visit.         ASSESSMENT and PLAN  Seizures: normal hut and cardiac w/u and neuro w/u  Likely intense stress  from work a cause of her symptoms but we will continue to remain alert to recurring symptoms  Her ECG shows NSR and NT and no gross changes from previous  Hut negative but started on metoprolol for his bp   This will be continued     Hypertension: well controlled     We have discussed exercise and weight loss     otherwise see her back in 1 year

## 2019-03-15 ENCOUNTER — TELEPHONE (OUTPATIENT)
Dept: CARDIOLOGY CLINIC | Age: 49
End: 2019-03-15

## 2019-03-15 ENCOUNTER — OFFICE VISIT (OUTPATIENT)
Dept: URGENT CARE | Age: 49
End: 2019-03-15

## 2019-03-15 VITALS
SYSTOLIC BLOOD PRESSURE: 139 MMHG | RESPIRATION RATE: 16 BRPM | WEIGHT: 284 LBS | BODY MASS INDEX: 44.57 KG/M2 | HEART RATE: 67 BPM | OXYGEN SATURATION: 97 % | TEMPERATURE: 98.4 F | DIASTOLIC BLOOD PRESSURE: 65 MMHG | HEIGHT: 67 IN

## 2019-03-15 DIAGNOSIS — R07.89 CHEST WALL PAIN: ICD-10-CM

## 2019-03-15 DIAGNOSIS — R20.2 PARESTHESIA OF BOTH HANDS: Primary | ICD-10-CM

## 2019-03-15 LAB
ANION GAP BLD CALC-SCNC: 17 MMOL/L
BUN BLD-MCNC: 12 MG/DL
CHLORIDE BLD-SCNC: 102 MMOL/L
CO2 BLD-SCNC: 26 MMOL/L
CREAT BLD-MCNC: 0.7 MG/DL (ref 0.6–1.3)
GLUCOSE BLD STRIP.AUTO-MCNC: 125 MG/DL
HCT VFR BLD CALC: 38 %
HGB BLD-MCNC: 12.9 G/DL
IONIZED CALCIUM ISTA,ICAI: 1.22
POTASSIUM BLD-SCNC: 3.9 MMOL/L
SODIUM BLD-SCNC: 140 MMOL/L

## 2019-03-15 NOTE — PATIENT INSTRUCTIONS
Follow up with PCP  ER if worse     Musculoskeletal Chest Pain: Care Instructions  Your Care Instructions    Chest pain is not always a sign that something is wrong with your heart or that you have another serious problem. The doctor thinks your chest pain is caused by strained muscles or ligaments, inflamed chest cartilage, or another problem in your chest, rather than by your heart. You may need more tests to find the cause of your chest pain. Follow-up care is a key part of your treatment and safety. Be sure to make and go to all appointments, and call your doctor if you are having problems. It's also a good idea to know your test results and keep a list of the medicines you take. How can you care for yourself at home? · Take pain medicines exactly as directed. ? If the doctor gave you a prescription medicine for pain, take it as prescribed. ? If you are not taking a prescription pain medicine, ask your doctor if you can take an over-the-counter medicine. · Rest and protect the sore area. · Stop, change, or take a break from any activity that may be causing your pain or soreness. · Put ice or a cold pack on the sore area for 10 to 20 minutes at a time. Try to do this every 1 to 2 hours for the next 3 days (when you are awake) or until the swelling goes down. Put a thin cloth between the ice and your skin. · After 2 or 3 days, apply a heating pad set on low or a warm cloth to the area that hurts. Some doctors suggest that you go back and forth between hot and cold. · Do not wrap or tape your ribs for support. This may cause you to take smaller breaths, which could increase your risk of lung problems. · Mentholated creams such as Bengay or Icy Hot may soothe sore muscles. Follow the instructions on the package. · Follow your doctor's instructions for exercising. · Gentle stretching and massage may help you get better faster.  Stretch slowly to the point just before pain begins, and hold the stretch for at least 15 to 30 seconds. Do this 3 or 4 times a day. Stretch just after you have applied heat. · As your pain gets better, slowly return to your normal activities. Any increased pain may be a sign that you need to rest a while longer. When should you call for help? Call 911 anytime you think you may need emergency care. For example, call if:    · You have chest pain or pressure. This may occur with:  ? Sweating. ? Shortness of breath. ? Nausea or vomiting. ? Pain that spreads from the chest to the neck, jaw, or one or both shoulders or arms. ? Dizziness or lightheadedness. ? A fast or uneven pulse. After calling 911, chew 1 adult-strength aspirin. Wait for an ambulance. Do not try to drive yourself.     · You have sudden chest pain and shortness of breath, or you cough up blood.    Call your doctor now or seek immediate medical care if:    · You have any trouble breathing.     · Your chest pain gets worse.     · Your chest pain occurs consistently with exercise and is relieved by rest.    Watch closely for changes in your health, and be sure to contact your doctor if:    · Your chest pain does not get better after 1 week. Where can you learn more? Go to http://chapo-ema.info/. Enter V293 in the search box to learn more about \"Musculoskeletal Chest Pain: Care Instructions. \"  Current as of: September 23, 2018  Content Version: 11.9  © 4297-0095 [employer]. Care instructions adapted under license by [employer] (which disclaims liability or warranty for this information). If you have questions about a medical condition or this instruction, always ask your healthcare professional. [address] any warranty or liability for your use of this information. Numbness and Tingling: Care Instructions  Your Care Instructions    Many things can cause numbness or tingling. Swelling may put pressure on a nerve.  This could cause you to lose feeling or have a pins-and-needles sensation on part of your body. Nerves may be damaged from trauma, toxins, or diseases, such as diabetes or multiple sclerosis (MS). Sometimes, though, the cause is not clear. If there is no clear reason for your symptoms, and you are not having any other symptoms, your doctor may suggest watching and waiting for a while to see if the numbness or tingling goes away on its own. Your doctor may want you to have blood or nerve tests to find the cause of your symptoms. Follow-up care is a key part of your treatment and safety. Be sure to make and go to all appointments, and call your doctor if you are having problems. It's also a good idea to know your test results and keep a list of the medicines you take. How can you care for yourself at home? · If your doctor prescribes medicine, take it exactly as directed. Call your doctor if you think you are having a problem with your medicine. · If you have any swelling, put ice or a cold pack on the area for 10 to 20 minutes at a time. Put a thin cloth between the ice and your skin. When should you call for help? Call 911 anytime you think you may need emergency care. For example, call if:    · You have weakness, numbness, or tingling in both legs.     · You lose bowel or bladder control.     · You have symptoms of a stroke. These may include:  ? Sudden numbness, tingling, weakness, or loss of movement in your face, arm, or leg, especially on only one side of your body. ? Sudden vision changes. ? Sudden trouble speaking. ? Sudden confusion or trouble understanding simple statements. ? Sudden problems with walking or balance. ? A sudden, severe headache that is different from past headaches.    Watch closely for changes in your health, and be sure to contact your doctor if you have any problems, or if:    · You do not get better as expected. Where can you learn more? Go to http://chapo-ema.info/.   Enter U128 in the search box to learn more about \"Numbness and Tingling: Care Instructions. \"  Current as of: Connie 3, 2018  Content Version: 11.9  © 0877-5262 [employer]. Care instructions adapted under license by [employer] (which disclaims liability or warranty for this information). If you have questions about a medical condition or this instruction, always ask your healthcare professional. [place] [address] any warranty or liability for your use of this information.

## 2019-03-15 NOTE — TELEPHONE ENCOUNTER
Patient states she woke up feeling not too great this morning. She states she woke up feeling tingling in her chest and has a slight headache. She states she will be going to [facility] to make sure everything is alrigh t& she will be calling back to update.          Phone: [phone]

## 2019-03-15 NOTE — PROGRESS NOTES
Chest Pain    The history is provided by the patient. This is a new problem. Episode onset: 4 hrs ago. The problem has not changed since onset. The problem occurs constantly. The pain is associated with normal activity. The pain is present in the left side. The pain is at a severity of 6/10. The quality of the pain is described as dull. The pain does not radiate. The symptoms are aggravated by movement. Pertinent negatives include no abdominal pain, no back pain, no claudication, no cough, no diaphoresis, no dizziness, no exertional chest pressure, no fever, no headaches, no hemoptysis, no irregular heartbeat, no leg pain, no lower extremity edema, no malaise/fatigue, no nausea, no near-syncope, no numbness, no orthopnea, no palpitations, no PND, no shortness of breath, no sputum production, no vomiting and no weakness. Associated symptoms comments: Bilateral tingling sensation in fingers. She has tried nothing for the symptoms. Risk factors include hypertension. Past Medical History:   Diagnosis Date    Heart murmur     Hypertension     Ill-defined condition     pseudo seizure        Past Surgical History:   Procedure Laterality Date    HX GYN      \"cyst removal\"    HX SEPTOPLASTY      HX TONSIL AND ADENOIDECTOMY      TILT TABLE EVAL W/WO DRUG  5/21/2018              History reviewed. No pertinent family history.      Social History     Socioeconomic History    Marital status: SINGLE     Spouse name: Not on file    Number of children: Not on file    Years of education: Not on file    Highest education level: Not on file   Social Needs    Financial resource strain: Not on file    Food insecurity - worry: Not on file    Food insecurity - inability: Not on file    Transportation needs - medical: Not on file   Viralheat needs - non-medical: Not on file   Occupational History    Not on file   Tobacco Use    Smoking status: Never Smoker    Smokeless tobacco: Never Used   Substance and Sexual Activity    Alcohol use: No    Drug use: No    Sexual activity: Not on file   Other Topics Concern    Not on file   Social History Narrative    Not on file                ALLERGIES: Decadron [dexamethasone]    Review of Systems   Constitutional: Negative for chills, diaphoresis, fever and malaise/fatigue. Respiratory: Negative for cough, hemoptysis, sputum production, shortness of breath and wheezing. Cardiovascular: Positive for chest pain. Negative for palpitations, orthopnea, claudication, PND and near-syncope. Gastrointestinal: Negative for abdominal pain, nausea and vomiting. Musculoskeletal: Negative for back pain and myalgias. Skin: Negative for rash. Neurological: Negative for dizziness, weakness, numbness and headaches. Hematological: Negative for adenopathy. Vitals:    03/15/19 0918   BP: 139/65   Pulse: 67   Resp: 16   Temp: 98.4 °F (36.9 °C)   SpO2: 97%   Weight: 284 lb (128.8 kg)   Height: 5' 7\" (1.702 m)       Physical Exam   Constitutional: She appears well-developed and well-nourished. No distress. Cardiovascular: Normal rate, regular rhythm and normal heart sounds. Pulmonary/Chest: Effort normal and breath sounds normal. No respiratory distress. She has no wheezes. She has no rales. She exhibits tenderness. Neurological: She is alert. Skin: She is not diaphoretic. Psychiatric: She has a normal mood and affect. Her behavior is normal. Judgment and thought content normal.   Nursing note and vitals reviewed. MDM    ICD-10-CM ICD-9-CM    1. Paresthesia of both hands R20.2 782.0 AMB POC ISTAT CHEM 8+   2. Chest wall pain R07.89 786.52 EKG, 12 LEAD, INITIAL     Ibuprofen prn    The patient is to follow up with PCP. If signs and symptoms become worse the pt is to go to the ER.          EKG    Date/Time: 3/15/2019 10:10 AM  Performed by: Fior Kaiser MD  Authorized by: Fior Kaiser MD   Rhythm: sinus rhythm  Rate: normal  BPM: 60  QRS axis: normal  Conduction: conduction normal            Results for orders placed or performed in visit on 03/15/19   AMB POC ISTAT CHEM 8+   Result Value Ref Range    Sodium,  MMOL/L    Potassium, POC 3.9 MMOL/L    Chloride,  MMOL/L    Calcium, ionized (POC) 1.22     CO2, POC 26 MMOL/L    Glucose,  MG/DL    BUN, POC 12 MG/DL    Creatinine, POC 0.7 0.6 - 1.3 MG/DL    Hematocrit, POC 38 %    Hemoglobin, POC 12.9 G/DL    Anion gap, POC 17 MMOL/L

## 2019-03-18 RX ORDER — METOPROLOL TARTRATE 50 MG/1
TABLET ORAL
Qty: 60 TAB | Refills: 3 | Status: SHIPPED | OUTPATIENT
Start: 2019-03-18 | End: 2019-08-09 | Stop reason: SDUPTHER

## 2019-03-18 NOTE — TELEPHONE ENCOUNTER
Requested Prescriptions     Signed Prescriptions Disp Refills    metoprolol tartrate (LOPRESSOR) 50 mg tablet 60 Tab 3     Sig: TAKE 1 TABLET BY MOUTH TWICE A DAY     Authorizing Provider: BURAK ROSADO     Ordering User: Wilkes Hint       Per Dr. Manish Monsivais     No future appointments.

## 2019-03-21 RX ORDER — LOSARTAN POTASSIUM AND HYDROCHLOROTHIAZIDE 25; 100 MG/1; MG/1
1 TABLET ORAL DAILY
Qty: 90 TAB | Refills: 2 | Status: SHIPPED | OUTPATIENT
Start: 2019-03-21 | End: 2019-03-25

## 2019-03-21 NOTE — TELEPHONE ENCOUNTER
Requested Prescriptions     Signed Prescriptions Disp Refills    losartan-hydroCHLOROthiazide (HYZAAR) 100-25 mg per tablet 90 Tab 2     Sig: Take 1 Tab by mouth daily. Authorizing Provider: Cris Jensen     Ordering User: Christine Gaming     Verbal order per Dr. Addie Mcgee. To schedule yearly follow up.

## 2019-03-25 RX ORDER — LOSARTAN POTASSIUM 100 MG/1
100 TABLET ORAL DAILY
Qty: 90 TAB | Refills: 1 | Status: SHIPPED | OUTPATIENT
Start: 2019-03-25 | End: 2019-11-29

## 2019-03-25 RX ORDER — HYDROCHLOROTHIAZIDE 25 MG/1
25 TABLET ORAL DAILY
Qty: 90 TAB | Refills: 1 | Status: SHIPPED | OUTPATIENT
Start: 2019-03-25 | End: 2019-11-29

## 2019-03-25 NOTE — TELEPHONE ENCOUNTER
Requested Prescriptions     Signed Prescriptions Disp Refills    losartan (COZAAR) 100 mg tablet 90 Tab 1     Sig: Take 1 Tab by mouth daily. Take with 25 mg of hydrochlorathiazide. Authorizing Provider: Princess Alvarez     Ordering User: Nahid Loza    hydroCHLOROthiazide (HYDRODIURIL) 25 mg tablet 90 Tab 1     Sig: Take 1 Tab by mouth daily. Take with 100 mg losartan. Authorizing Provider: Princess Alvarez     Ordering User: Nahid Loza     Verbal order per Dr. Asha Everett.      To follow up with Dr. Mary Beth Moon in 1 year.

## 2019-08-12 RX ORDER — METOPROLOL TARTRATE 50 MG/1
TABLET ORAL
Qty: 60 TAB | Refills: 3 | Status: SHIPPED | OUTPATIENT
Start: 2019-08-12 | End: 2019-11-29

## 2019-08-12 NOTE — TELEPHONE ENCOUNTER
Request for Lopressor 50 mg BID. Last office visit 1/11/19, next office visit not yet scheduled. Refills per verbal order from Dr. De Luna.

## 2019-08-30 ENCOUNTER — HOSPITAL ENCOUNTER (OUTPATIENT)
Dept: MAMMOGRAPHY | Age: 49
Discharge: HOME | End: 2019-08-30
Payer: COMMERCIAL

## 2019-08-30 DIAGNOSIS — Z12.39 BREAST SCREENING, UNSPECIFIED: ICD-10-CM

## 2019-08-30 PROCEDURE — 77063 BREAST TOMOSYNTHESIS BI: CPT

## 2019-11-29 ENCOUNTER — OFFICE VISIT (OUTPATIENT)
Dept: URGENT CARE | Age: 49
End: 2019-11-29

## 2019-11-29 VITALS
WEIGHT: 278 LBS | HEART RATE: 70 BPM | SYSTOLIC BLOOD PRESSURE: 130 MMHG | HEIGHT: 66 IN | OXYGEN SATURATION: 97 % | RESPIRATION RATE: 18 BRPM | DIASTOLIC BLOOD PRESSURE: 60 MMHG | BODY MASS INDEX: 44.68 KG/M2 | TEMPERATURE: 98.3 F

## 2019-11-29 DIAGNOSIS — J06.9 VIRAL URI WITH COUGH: Primary | ICD-10-CM

## 2019-11-29 RX ORDER — OLMESARTAN MEDOXOMIL AND HYDROCHLOROTHIAZIDE 20/12.5 20; 12.5 MG/1; MG/1
1 TABLET ORAL
COMMUNITY
Start: 2019-04-28 | End: 2020-01-27 | Stop reason: SDUPTHER

## 2019-11-29 RX ORDER — FLUTICASONE PROPIONATE 50 MCG
2 SPRAY, SUSPENSION (ML) NASAL DAILY
Qty: 1 BOTTLE | Refills: 0 | Status: SHIPPED | OUTPATIENT
Start: 2019-11-29 | End: 2020-01-04

## 2019-11-29 RX ORDER — METFORMIN HYDROCHLORIDE 1000 MG/1
1000 TABLET ORAL 2 TIMES DAILY WITH MEALS
COMMUNITY
End: 2021-07-15

## 2019-11-29 RX ORDER — LORATADINE 10 MG/1
10 TABLET ORAL DAILY
Qty: 15 TAB | Refills: 0 | Status: SHIPPED | OUTPATIENT
Start: 2019-11-29 | End: 2020-01-04

## 2019-11-29 NOTE — PATIENT INSTRUCTIONS
Saline Nasal Washes: Care Instructions Your Care Instructions Saline nasal washes help keep the nasal passages open by washing out thick or dried mucus. This simple remedy can help relieve symptoms of allergies, sinusitis, and colds. It also can make the nose feel more comfortable by keeping the mucous membranes moist. You may notice a little burning sensation in your nose the first few times you use the solution, but this usually gets better in a few days. Follow-up care is a key part of your treatment and safety. Be sure to make and go to all appointments, and call your doctor if you are having problems. It's also a good idea to know your test results and keep a list of the medicines you take. How can you care for yourself at home? · You can buy premixed saline solution in a squeeze bottle or other sinus rinse products at a drugstore. Read and follow the instructions on the label. · You also can make your own saline solution by adding 1 teaspoon of salt and 1 teaspoon of baking soda to 2 cups of distilled water. · If you use a homemade solution, pour a small amount into a clean bowl. Using a rubber bulb syringe, squeeze the syringe and place the tip in the salt water. Pull a small amount of the salt water into the syringe by relaxing your hand. · Sit down with your head tilted slightly back. Do not lie down. Put the tip of the bulb syringe or the squeeze bottle a little way into one of your nostrils. Gently drip or squirt a few drops into the nostril. Repeat with the other nostril. Some sneezing and gagging are normal at first. 
· Gently blow your nose. · Wipe the syringe or bottle tip clean after each use. · Repeat this 2 or 3 times a day. · Use nasal washes gently if you have nosebleeds often. When should you call for help? Watch closely for changes in your health, and be sure to contact your doctor if: 
  · You often get nosebleeds.  
  · You have problems doing the nasal washes. Where can you learn more? Go to http://chapo-ema.info/. Enter [id number] [phone] in the search box to learn more about \"Saline Nasal Washes: Care Instructions. \" Current as of: October 21, 2018 Content Version: 12.2 © 1763-5730 [employer]. Care instructions adapted under license by [employer] (which disclaims liability or warranty for this information). If you have questions about a medical condition or this instruction, always ask your healthcare professional. [address] any warranty or liability for your use of this information. Viral Respiratory Infection: Care Instructions Your Care Instructions Viruses are very small organisms. They grow in number after they enter your body. There are many types that cause different illnesses, such as colds and the mumps. The symptoms of a viral respiratory infection often start quickly. They include a fever, sore throat, and runny nose. You may also just not feel well. Or you may not want to eat much. Most viral respiratory infections are not serious. They usually get better with time and self-care. Antibiotics are not used to treat a viral infection. That's because antibiotics will not help cure a viral illness. In some cases, antiviral medicine can help your body fight a serious viral infection. Follow-up care is a key part of your treatment and safety. Be sure to make and go to all appointments, and call your doctor if you are having problems. It's also a good idea to know your test results and keep a list of the medicines you take. How can you care for yourself at home? · Rest as much as possible until you feel better. · Be safe with medicines. Take your medicine exactly as prescribed. Call your doctor if you think you are having a problem with your medicine. You will get more details on the specific medicine your doctor prescribes. · Take an over-the-counter pain medicine, such as acetaminophen (Tylenol), ibuprofen (Advil, Motrin), or naproxen (Aleve), as needed for pain and fever. Read and follow all instructions on the label. Do not give aspirin to anyone younger than 20. It has been linked to Reye syndrome, a serious illness. · Drink plenty of fluids, enough so that your urine is light yellow or clear like water. Hot fluids, such as tea or soup, may help relieve congestion in your nose and throat. If you have kidney, heart, or liver disease and have to limit fluids, talk with your doctor before you increase the amount of fluids you drink. · Try to clear mucus from your lungs by breathing deeply and coughing. · Gargle with warm salt water once an hour. This can help reduce swelling and throat pain. Use 1 teaspoon of salt mixed in 1 cup of warm water. · Do not smoke or allow others to smoke around you. If you need help quitting, talk to your doctor about stop-smoking programs and medicines. These can increase your chances of quitting for good. To avoid spreading the virus · Cough or sneeze into a tissue. Then throw the tissue away. · If you don't have a tissue, use your hand to cover your cough or sneeze. Then clean your hand. You can also cough into your sleeve. · Wash your hands often. Use soap and warm water. Wash for 15 to 20 seconds each time. · If you don't have soap and water near you, you can clean your hands with alcohol wipes or gel. When should you call for help? Call your doctor now or seek immediate medical care if: 
  · You have a new or higher fever.  
  · Your fever lasts more than 48 hours.  
  · You have trouble breathing.  
  · You have a fever with a stiff neck or a severe headache.  
  · You are sensitive to light.  
  · You feel very sleepy or confused.  
 Watch closely for changes in your health, and be sure to contact your doctor if: 
  · You do not get better as expected. Where can you learn more? Go to http://chapo-ema.info/. Enter M745 in the search box to learn more about \"Viral Respiratory Infection: Care Instructions. \" Current as of: June 9, 2019 Content Version: 12.2 © 4960-1111 [employer]. Care instructions adapted under license by [employer] (which disclaims liability or warranty for this information). If you have questions about a medical condition or this instruction, always ask your healthcare professional. [address] any warranty or liability for your use of this information.

## 2019-11-29 NOTE — PROGRESS NOTES
Cold Symptoms   The history is provided by the patient. This is a new problem. The current episode started more than 2 days ago. The problem occurs constantly. The problem has not changed since onset. The cough is non-productive. Associated symptoms include rhinorrhea and sore throat. Pertinent negatives include no chest pain, no chills, no ear congestion, no shortness of breath and no wheezing. She has tried nothing for the symptoms. She is not a smoker. Past Medical History:   Diagnosis Date    Diabetes ([address]     Heart murmur     Hypertension     Ill-defined condition     pseudo seizure        Past Surgical History:   Procedure Laterality Date    HX GYN      \"cyst removal\"    HX SEPTOPLASTY      HX TONSIL AND ADENOIDECTOMY      TILT TABLE EVAL W/WO DRUG  5/21/2018              History reviewed. No pertinent family history.      Social History     Socioeconomic History    Marital status: SINGLE     Spouse name: Not on file    Number of children: Not on file    Years of education: Not on file    Highest education level: Not on file   Occupational History    Not on file   Social Needs    Financial resource strain: Not on file    Food insecurity:     Worry: Not on file     Inability: Not on file    Transportation needs:     Medical: Not on file     Non-medical: Not on file   Tobacco Use    Smoking status: Never Smoker    Smokeless tobacco: Never Used   Substance and Sexual Activity    Alcohol use: No    Drug use: No    Sexual activity: Not on file   Lifestyle    Physical activity:     Days per week: Not on file     Minutes per session: Not on file    Stress: Not on file   Relationships    Social connections:     Talks on phone: Not on file     Gets together: Not on file     Attends Islam service: Not on file     Active member of club or organization: Not on file     Attends meetings of clubs or organizations: Not on file     Relationship status: Not on file    Intimate partner violence:     Fear of current or ex partner: Not on file     Emotionally abused: Not on file     Physically abused: Not on file     Forced sexual activity: Not on file   Other Topics Concern    Not on file   Social History Narrative    Not on file                ALLERGIES: Decadron [dexamethasone]    Review of Systems   Constitutional: Negative for chills. HENT: Positive for congestion, rhinorrhea, sneezing and sore throat. Respiratory: Negative for shortness of breath and wheezing. Cardiovascular: Negative for chest pain. All other systems reviewed and are negative. Vitals:    19 0831   BP: 130/60   Pulse: 70   Resp: 18   Temp: 98.3 °F (36.8 °C)   SpO2: 97%   Weight: 278 lb (126.1 kg)   Height: 5' 6\" (1.676 m)       Physical Exam  Vitals signs and nursing note reviewed. Constitutional:       General: She is not in acute distress. HENT:      Right Ear: Tympanic membrane and ear canal normal.      Left Ear: Tympanic membrane and ear canal normal.      Nose: Nose normal.      Mouth/Throat:      Pharynx: No oropharyngeal exudate or posterior oropharyngeal erythema. Eyes:      General:         Right eye: No discharge. Left eye: No discharge. Conjunctiva/sclera: Conjunctivae normal.   Neck:      Musculoskeletal: Neck supple. Pulmonary:      Effort: Pulmonary effort is normal. No respiratory distress. Breath sounds: Normal breath sounds. No wheezing or rales. Lymphadenopathy:      Cervical: No cervical adenopathy. Skin:     Findings: No rash. MDM    Procedures             ICD-10-CM ICD-9-CM    1. Viral URI with cough J06.9 465.9     B97.89       Medications Ordered Today   Medications    fluticasone propionate (FLONASE) 50 mcg/actuation nasal spray     Si Sprays by Both Nostrils route daily.      Dispense:  1 Bottle     Refill:  0    neaoittdb-NS-bycuzssq-guaifen (MUCINEX FAST-MAX COLD-FLU-THRT) 5--200 mg cap     Sig: Take 2 Caps by mouth four (4) times daily as needed for Cough (congestion). Dispense:  30 Cap     Refill:  0    loratadine (CLARITIN) 10 mg tablet     Sig: Take 1 Tab by mouth daily. Dispense:  15 Tab     Refill:  0     No results found for any visits on 11/29/19. The patients condition was discussed with the patient and they understand. The patient is to follow up with primary care doctor. If signs and symptoms become worse the pt is to go to the ER. The patient is to take medications as prescribed.

## 2019-12-10 RX ORDER — METOPROLOL TARTRATE 50 MG/1
TABLET ORAL
Qty: 60 TAB | Refills: 3 | Status: SHIPPED | OUTPATIENT
Start: 2019-12-10 | End: 2020-01-27 | Stop reason: SDUPTHER

## 2020-01-04 ENCOUNTER — OFFICE VISIT (OUTPATIENT)
Dept: URGENT CARE | Age: 50
End: 2020-01-04

## 2020-01-04 VITALS
RESPIRATION RATE: 18 BRPM | HEIGHT: 66 IN | TEMPERATURE: 97.7 F | OXYGEN SATURATION: 97 % | SYSTOLIC BLOOD PRESSURE: 177 MMHG | DIASTOLIC BLOOD PRESSURE: 82 MMHG | WEIGHT: 279 LBS | HEART RATE: 80 BPM | BODY MASS INDEX: 44.84 KG/M2

## 2020-01-04 DIAGNOSIS — H00.14 CHALAZION OF LEFT UPPER EYELID: ICD-10-CM

## 2020-01-04 DIAGNOSIS — J06.9 UPPER RESPIRATORY TRACT INFECTION, UNSPECIFIED TYPE: Primary | ICD-10-CM

## 2020-01-04 NOTE — PROGRESS NOTES
The history is provided by the patient. Cold Symptoms   The history is provided by the patient. This is a new problem. The current episode started more than 2 days ago. The problem occurs constantly. The problem has been rapidly improving. The cough is non-productive. There has been no fever. Pertinent negatives include no chest pain, no chills, no sweats, no eye redness, no ear congestion, no ear pain, no rhinorrhea, no sore throat, no myalgias, no shortness of breath, no wheezing and no nausea. Associated symptoms comments: Post nasal drainage. Treatments tried: mucinex D. The treatment provided no relief. Eyelid Inflammation   This is a new problem. The current episode started more than 2 days ago. The problem has not changed since onset. Pertinent negatives include no chest pain and no shortness of breath. Nothing aggravates the symptoms. Nothing relieves the symptoms. She has tried nothing for the symptoms. Past Medical History:   Diagnosis Date    Diabetes ([address]     Heart murmur     Hypertension     Ill-defined condition     pseudo seizure        Past Surgical History:   Procedure Laterality Date    HX GYN      \"cyst removal\"    HX SEPTOPLASTY      HX TONSIL AND ADENOIDECTOMY      TILT TABLE EVAL W/WO DRUG  5/21/2018              History reviewed. No pertinent family history.      Social History     Socioeconomic History    Marital status: SINGLE     Spouse name: Not on file    Number of children: Not on file    Years of education: Not on file    Highest education level: Not on file   Occupational History    Not on file   Social Needs    Financial resource strain: Not on file    Food insecurity:     Worry: Not on file     Inability: Not on file    Transportation needs:     Medical: Not on file     Non-medical: Not on file   Tobacco Use    Smoking status: Never Smoker    Smokeless tobacco: Never Used   Substance and Sexual Activity    Alcohol use: No    Drug use: No    Sexual activity: Not on file   Lifestyle    Physical activity:     Days per week: Not on file     Minutes per session: Not on file    Stress: Not on file   Relationships    Social connections:     Talks on phone: Not on file     Gets together: Not on file     Attends Restorationist service: Not on file     Active member of club or organization: Not on file     Attends meetings of clubs or organizations: Not on file     Relationship status: Not on file    Intimate partner violence:     Fear of current or ex partner: Not on file     Emotionally abused: Not on file     Physically abused: Not on file     Forced sexual activity: Not on file   Other Topics Concern    Not on file   Social History Narrative    Not on file                ALLERGIES: Decadron [dexamethasone]    Review of Systems   Constitutional: Negative for chills, fatigue and fever. HENT: Positive for postnasal drip. Negative for congestion, ear pain, rhinorrhea, sore throat and trouble swallowing. Eyes: Negative for redness. Respiratory: Negative for cough, chest tightness, shortness of breath and wheezing. Cardiovascular: Negative for chest pain. Gastrointestinal: Negative. Negative for nausea. Musculoskeletal: Negative for myalgias. Skin: Negative. Vitals:    01/04/20 1239   BP: (!) 190/100   Pulse: 80   Resp: 18   Temp: 97.7 °F (36.5 °C)   SpO2: 97%   Weight: 279 lb (126.6 kg)   Height: 5' 6\" (1.676 m)       Physical Exam  Constitutional:       Appearance: Normal appearance. She is well-developed. HENT:      Head:      Comments: Left upper eyelid lump that is painless upon palpation. No oozing or drainage. No vision abnormalities. Right Ear: Tympanic membrane normal.      Left Ear: Tympanic membrane normal.      Nose: Congestion and rhinorrhea present.       Mouth/Throat:      Mouth: Mucous membranes are moist.      Comments: Post nasal drainage  Eyes:      Conjunctiva/sclera: Conjunctivae normal.      Pupils: Pupils are equal, round, and reactive to light. Neck:      Musculoskeletal: Normal range of motion. Cardiovascular:      Rate and Rhythm: Normal rate and regular rhythm. Heart sounds: Normal heart sounds. Pulmonary:      Effort: Pulmonary effort is normal.      Breath sounds: Normal breath sounds. Musculoskeletal: Normal range of motion. Lymphadenopathy:      Cervical: No cervical adenopathy. Skin:     General: Skin is warm and dry. Neurological:      Mental Status: She is alert and oriented to person, place, and time. MDM     Differential Diagnosis; Clinical Impression; Plan:     (J06.9) Upper respiratory tract infection, unspecified type  (primary encounter diagnosis)  (H00.14) Chalazion of left upper eyelid  No orders of the defined types were placed in this encounter. Advised to use warm compress to left upper eyelid 15 min 4 x day. Use Coricidin for decongestant. BP likely elevated due to Mucinex D. The patients condition was discussed with the patient and they understand. The patient is to follow up with PCP. If signs and symptoms become worse the pt is to go to the ER. The patient is to take medications as prescribed. AVS given with patient instructions upon discharge.                   Procedures

## 2020-01-04 NOTE — PATIENT INSTRUCTIONS

## 2020-01-27 ENCOUNTER — OFFICE VISIT (OUTPATIENT)
Dept: FAMILY MEDICINE CLINIC | Age: 50
End: 2020-01-27

## 2020-01-27 VITALS
WEIGHT: 276.9 LBS | OXYGEN SATURATION: 97 % | BODY MASS INDEX: 43.46 KG/M2 | RESPIRATION RATE: 18 BRPM | TEMPERATURE: 97 F | DIASTOLIC BLOOD PRESSURE: 84 MMHG | SYSTOLIC BLOOD PRESSURE: 136 MMHG | HEIGHT: 67 IN | HEART RATE: 70 BPM

## 2020-01-27 DIAGNOSIS — I10 ESSENTIAL HYPERTENSION: Primary | ICD-10-CM

## 2020-01-27 DIAGNOSIS — E66.01 CLASS 3 SEVERE OBESITY WITH SERIOUS COMORBIDITY AND BODY MASS INDEX (BMI) OF 40.0 TO 44.9 IN ADULT, UNSPECIFIED OBESITY TYPE (HCC): ICD-10-CM

## 2020-01-27 DIAGNOSIS — E55.9 VITAMIN D DEFICIENCY: ICD-10-CM

## 2020-01-27 DIAGNOSIS — R73.03 PREDIABETES: ICD-10-CM

## 2020-01-27 DIAGNOSIS — E78.2 MIXED HYPERLIPIDEMIA: ICD-10-CM

## 2020-01-27 RX ORDER — CLOBETASOL PROPIONATE 0.5 MG/G
CREAM TOPICAL 2 TIMES DAILY
COMMUNITY
End: 2020-10-02 | Stop reason: ALTCHOICE

## 2020-01-27 RX ORDER — OLMESARTAN MEDOXOMIL AND HYDROCHLOROTHIAZIDE 20/12.5 20; 12.5 MG/1; MG/1
1 TABLET ORAL DAILY
Qty: 90 TAB | Refills: 1 | Status: SHIPPED | OUTPATIENT
Start: 2020-01-27 | End: 2020-08-24

## 2020-01-27 RX ORDER — ERGOCALCIFEROL 1.25 MG/1
CAPSULE ORAL
COMMUNITY
Start: 2019-03-22 | End: 2020-01-27 | Stop reason: ALTCHOICE

## 2020-01-27 RX ORDER — METOPROLOL TARTRATE 50 MG/1
TABLET ORAL
COMMUNITY
Start: 2018-06-21 | End: 2020-01-27 | Stop reason: SDUPTHER

## 2020-01-27 RX ORDER — PHENTERMINE HYDROCHLORIDE 8 MG/1
TABLET ORAL
Refills: 0 | COMMUNITY
Start: 2019-11-05 | End: 2020-01-27

## 2020-01-27 RX ORDER — ATORVASTATIN CALCIUM 40 MG/1
40 TABLET, FILM COATED ORAL
COMMUNITY
Start: 2019-04-22 | End: 2020-06-22

## 2020-01-27 RX ORDER — METFORMIN HYDROCHLORIDE 500 MG/1
TABLET, EXTENDED RELEASE ORAL
Refills: 11 | COMMUNITY
Start: 2019-11-12 | End: 2020-01-27 | Stop reason: SDUPTHER

## 2020-01-27 NOTE — PATIENT INSTRUCTIONS
EXERCISE 150 MINUTES WEEKLY. THIS CAN BE ACHIEVED BY WORKING OUT OR WALKING A MINIMUM OF 30 MINUTES FOR 5 DAYS WEEKLY. YOU CAN EXERCISE IN INCREMENTS OF 10-15 MINUTES UP TO 3 TIMES A DAY. Consider performing \"brainless exercise. \"  Choose your favorite tv program.  Five minutes before and for 5 minutes after the tv program, stretch your body. While the program is on, walk in place watching the show. When commercials come on, rest or walk around the house to do other things. When the program begins, return to walking in place. When you are able keep walking during the commercials and add light weights to your ankles or hands. By the end of the show, you would have walked 30 minutes. If you need shorter spurts of exercise, walk during the commercials and rest during the show. Drink to glasses of water prior to any exercise to prevent dehydration and to improve the results of the work out. Learning About the [address] [facility] Diet What is the Mediterranean diet? The Mediterranean diet is a style of eating rather than a diet plan. It features foods eaten in [place], [place], [place] and [place], and other countries along the [facility]. It emphasizes eating foods like fish, fruits, vegetables, beans, high-fiber breads and whole grains, nuts, and olive oil. This style of eating includes limited red meat, cheese, and sweets. Why choose the Mediterranean diet? A Mediterranean-style diet may improve heart health. It contains more fat than other heart-healthy diets. But the fats are mainly from nuts, unsaturated oils (such as fish oils and olive oil), and certain nut or seed oils (such as canola, soybean, or flaxseed oil). These fats may help protect the heart and blood vessels. How can you get started on the Mediterranean diet? Here are some things you can do to switch to a more Mediterranean way of eating. What to eat · Eat a variety of fruits and vegetables each day, such as grapes, blueberries, tomatoes, broccoli, peppers, figs, olives, spinach, eggplant, beans, lentils, and chickpeas. · Eat a variety of whole-grain foods each day, such as oats, brown rice, and whole wheat bread, pasta, and couscous. · Eat fish at least 2 times a week. Try tuna, salmon, mackerel, lake trout, herring, or sardines. · Eat moderate amounts of low-fat dairy products, such as milk, cheese, or yogurt. · Eat moderate amounts of poultry and eggs. · Choose healthy (unsaturated) fats, such as nuts, olive oil, and certain nut or seed oils like canola, soybean, and flaxseed. · Limit unhealthy (saturated) fats, such as butter, palm oil, and coconut oil. And limit fats found in animal products, such as meat and dairy products made with whole milk. Try to eat red meat only a few times a month in very small amounts. · Limit sweets and desserts to only a few times a week. This includes sugar-sweetened drinks like soda. The Mediterranean diet may also include red wine with your meal1 glass each day for women and up to 2 glasses a day for men. Tips for eating at home · Use herbs, spices, garlic, lemon zest, and citrus juice instead of salt to add flavor to foods. · Add avocado slices to your sandwich instead of bonilla. · Have fish for lunch or dinner instead of red meat. Brush the fish with olive oil, and broil or grill it. · Sprinkle your salad with seeds or nuts instead of cheese. · Cook with olive or canola oil instead of butter or oils that are high in saturated fat. · Switch from 2% milk or whole milk to 1% or fat-free milk. · Dip raw vegetables in a vinaigrette dressing or hummus instead of dips made from mayonnaise or sour cream. 
· Have a piece of fruit for dessert instead of a piece of cake. Try baked apples, or have some dried fruit. Tips for eating out · Try broiled, grilled, baked, or poached fish instead of having it fried or breaded. · Ask your  to have your meals prepared with olive oil instead of butter. · Order dishes made with marinara sauce or sauces made from olive oil. Avoid sauces made from cream or mayonnaise. · Choose whole-grain breads, whole wheat pasta and pizza crust, brown rice, beans, and lentils. · Cut back on butter or margarine on bread. Instead, you can dip your bread in a small amount of olive oil. · Ask for a side salad or grilled vegetables instead of french fries or chips. Where can you learn more? Go to http://chapo-ema.info/. Enter [phone] in the search box to learn more about \"Learning About the Mediterranean Diet. \" Current as of: November 7, 2018 Content Version: 12.2 © 6909-7241 [employer]. Care instructions adapted under license by [employer] (which disclaims liability or warranty for this information). If you have questions about a medical condition or this instruction, always ask your healthcare professional. [address] any warranty or liability for your use of this information.

## 2020-01-27 NOTE — PROGRESS NOTES
El Verma is a 47 y.o. female  Chief Complaint   Patient presents with    [facility]    Complete Physical     yearly physical     Health Maintenance Due   Topic Date Due    DTaP/Tdap/Td series (1 - Tdap) 07/27/1980    PAP AKA CERVICAL CYTOLOGY  07/27/1990    Shingrix Vaccine Age 50> (1 of 2) 07/27/2019    FOBT Q 1 YEAR AGE 50-75  07/27/2019    Influenza Age 5 to Adult  08/01/2019     Visit Vitals  /78   Pulse 70   Temp 97 °F (36.1 °C) (Oral)   Resp 18   Ht 5' 6.5\" (1.689 m)   Wt 276 lb 14.4 oz (125.6 kg)   SpO2 97%   BMI 44.02 kg/m²     1. Have you been to the ER, urgent care clinic since your last visit? Hospitalized since your last visit? Yes, Urgent care [facility] on [address]. 2. Have you seen or consulted any other health care providers outside of the [address] [place] [address] since your last visit? Include any pap smears or colon screening.  No

## 2020-01-27 NOTE — PROGRESS NOTES
HPI:  47 y.o.  presents as new patient to establish care. She has been in [facility] since 2005 from [facility], but was going to [facility] for urgent care. She is single, no kids, never . 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 She does not have an exercise routine. She saw [facility] for nutrition and weight loss, but did not feel she had enough dietary guidance and is interested in seeing a nutritionist. Was briefly on Lomaira (phentermine), but only lost 10 lbs from 9/2019 and 1/2020. She has h/o  syncope and has h/o pseudo-seizures. In 2018, she saw cardiology and neurology. She had tilt table test and EEG, both normal by her report. She was prescribed a beta-blocker by cardiologist and [facility] maintained it, but she is not sure why she is on it. HTN - on olmesartan/HCTZ 20/12.5; she is also on metoprolol 50 mg once dailly (prescribed BID by cardiology for elevated BP and LVH)    Cardiology note on chart 6/19/2018, Dr Indio Rice reviewed  Neurology note on chart 5/24/2018, Dr Delano Alcantara reviewed    Vit D deficiency - told was low in 2018 by Eric Mcclain, reports level of 5, took RX 11/2018 - 2/2019, is not on any OTC Vit D3, last had labs in 2019 with [facility] and [facility] and the states \"they did not say anything about my Vit D\"    Pre-diabetes - diagnosed in 2019 at [facility], run by Dr. Stefanie Martinez; started on metformin ER 1000 mg BID and a weight loss medication Lomaira 8 mg once dialy which she ran out of 3 wks ago.     CHL - elevated in 2018 at [facility], started atorvastatin 40 mg    GYN - sees Dr. Gaby West, has IUD    Keloid - sees dermatologist Dr. Nivia Hollingsworth, on clobetasol    Patient Active Problem List    Diagnosis    Syncope and collapse    Hypertension    Obesity, morbid ([address]    Altered mental status, unspecified    Convulsion ([address]         Past Medical History:   Diagnosis Date    [facility]     Heart murmur     Hypertension     Ill-defined condition     pseudo seizure       Social History     Tobacco Use    Smoking status: Never Smoker    Smokeless tobacco: Never Used   Substance Use Topics    Alcohol use: No    Drug use: No       Outpatient Medications Marked as Taking for the 1/27/20 encounter (Office Visit) with Orly Martinez PA-C   Medication Sig Dispense Refill    LOMAIRA 8 mg tab TAKE 1 TABLET BY MOUTH BEFORE MEALS ONCE A DAY  0    atorvastatin (LIPITOR) 40 mg tablet Take 40 mg by mouth.  metFORMIN ER (GLUCOPHAGE XR) 500 mg tablet PLEASE SEE ATTACHED FOR DETAILED DIRECTIONS  11    metoprolol tartrate (LOPRESSOR) 50 mg tablet TAKE 1 TABLET BY MOUTH TWICE A DAY      levonorgestreL (MIRENA) 20 mcg/24 hours (5 yrs) 52 mg IUD 1 Device by IntraUTERine route once.  clobetasoL (TEMOVATE) 0.05 % topical cream Apply  to affected area two (2) times a day.  olmesartan-hydroCHLOROthiazide (BENICAR HCT) 20-12.5 mg per tablet Take 1 Tab by mouth. Allergies   Allergen Reactions    Decadron [Dexamethasone] Unknown (comments)       ROS:  ROS negative except as per HPI. PE:  Visit Vitals  /78   Pulse 70   Temp 97 °F (36.1 °C) (Oral)   Resp 18   Ht 5' 6.5\" (1.689 m)   Wt 276 lb 14.4 oz (125.6 kg)   SpO2 97%   BMI 44.02 kg/m²     Gen: alert, oriented, no acute distress  Head: normocephalic, atraumatic  Ears: external auditory canals clear, TMs without erythema or effusion  Eyes: pupils equal round reactive to light, sclera clear, conjunctiva clear  Oral: moist mucus membranes, no oral lesions, no pharyngeal inflammation or exudate  Neck: symmetric normal sized thyroid, no carotid bruits, no lymphadenopathy   Resp: no increase work of breathing, lungs clear to ausculation bilaterally, no wheezing, rales or rhonchi  CV: S1, S2 normal.  No murmurs, rubs, or gallops. Abd: soft, not tender, not distended. No hepatosplenomegaly. Normal bowel sounds.     Neuro: cranial nerves intact, normal strength and movement in all extremities, sensation intact and symmetric. Skin: keloid central chest  Extremities: no cyanosis or edema    No results found for this visit on 01/27/20. Labs she brought with her from 4/14/2018 show: Total   TG 69    HDL 36    Assessment/Plan:      ICD-10-CM ICD-9-CM    1. Essential hypertension -  Controlled, 140/78, on olmesartan/HCTZ 20/12.5; she is also on metoprolol 50 mg once daily. Continue current medication. Exercise, and low salt/ low caffeine diet were discussed. Y48 153.2 METABOLIC PANEL, COMPREHENSIVE      CBC WITH AUTOMATED DIFF      LIPID PANEL      TSH 3RD GENERATION      olmesartan-hydroCHLOROthiazide (BENICAR HCT) 20-12.5 mg per tablet   2. Mixed hyperlipidemia - elevated in 2018 at [facility], started atorvastatin 40 mg, has not been rechecked since then E78.2 272.2 REFERRAL TO NUTRITION      LIPID PANEL   3. Prediabetes - diagnosed in 2019 at [facility], run by Dr. Perlita Abernathy; started on metformin ER 1000 mg BID, she lost 10 lbs in 4 months on phentermine, she would prefer to see nutritionist and not take phentermine R73.03 790.29 REFERRAL TO NUTRITION      METABOLIC PANEL, COMPREHENSIVE      HEMOGLOBIN A1C WITH EAG   4. Vitamin D deficiency -  told was low in 2018 by Gyn, reports level of 5, took RX 11/2018 - 2/2019, is not on any OTC Vit D3, E55.9 268.9 VITAMIN D, 25 HYDROXY   5. Class 3 severe obesity with serious comorbidity and body mass index (BMI) of 40.0 to 44.9 in adult, unspecified obesity type ([address] - diet and exercise discussed, she lost 10 lbs on phentermine at end of 2019, referral to nutritionist  E66.01 278.01 REFERRAL TO NUTRITION    L08.48 W51.19 METABOLIC PANEL, COMPREHENSIVE      CBC WITH AUTOMATED DIFF      HEMOGLOBIN A1C WITH EAG      LIPID PANEL      TSH 3RD GENERATION      VITAMIN D, [address] Maintenance reviewed - updated.         Medications Discontinued During This Encounter   Medication Reason    xvezhwiln-TX-pibvsoiw-guaifen (MUCINEX FAST-MAX COLD-FLU-THRT) 5--200 mg cap Not A Current Medication    metoprolol tartrate (LOPRESSOR) 50 mg tablet Duplicate Order    atorvastatin (LIPITOR) 40 mg tablet Duplicate Order    LOMAIRA 8 mg tab Not A Current Medication    ergocalciferol (ERGOCALCIFEROL) 1,250 mcg (50,000 unit) capsule Therapy Completed    metFORMIN ER (GLUCOPHAGE XR) 743 mg tablet Duplicate Order    metoprolol tartrate (LOPRESSOR) 50 mg tablet Duplicate Order    olmesartan-hydroCHLOROthiazide (BENICAR HCT) 20-12.5 mg per tablet Reorder       Current Outpatient Medications   Medication Sig Dispense Refill    atorvastatin (LIPITOR) 40 mg tablet Take 40 mg by mouth.  levonorgestreL (MIRENA) 20 mcg/24 hours (5 yrs) 52 mg IUD 1 Device by IntraUTERine route once.  clobetasoL (TEMOVATE) 0.05 % topical cream Apply  to affected area two (2) times a day.  olmesartan-hydroCHLOROthiazide (BENICAR HCT) 20-12.5 mg per tablet Take 1 Tab by mouth daily. 90 Tab 1    metFORMIN (GLUCOPHAGE) 1,000 mg tablet Take 1,000 mg by mouth two (2) times daily (with meals).  metoprolol tartrate (LOPRESSOR) 50 mg tablet Take 1 Tab by mouth two (2) times a day. 180 Tab 1       Recommended healthy diet low in carbohydrates, fats, sodium and cholesterol. Recommended regular cardiovascular exercise 3-6 times per week for 30-60 minutes daily. Verbal and written instructions (see AVS) provided. Patient expresses understanding of diagnosis and treatment plan. Follow-up and Dispositions    · Return in about 3 months (around 4/27/2020) for HTN, prediabetes.        Future Appointments   Date Time Provider Shereen Tam   2/4/2020  2:30 PM [facility]   4/27/2020  8:30 AM Kathi Martinez PA-C [facility] [address]

## 2020-01-28 LAB
25(OH)D3+25(OH)D2 SERPL-MCNC: 29.4 NG/ML (ref 30–100)
ALBUMIN SERPL-MCNC: 4.2 G/DL (ref 3.8–4.8)
ALBUMIN/GLOB SERPL: 1.2 {RATIO} (ref 1.2–2.2)
ALP SERPL-CCNC: 107 IU/L (ref 39–117)
ALT SERPL-CCNC: 18 IU/L (ref 0–32)
AST SERPL-CCNC: 14 IU/L (ref 0–40)
BASOPHILS # BLD AUTO: 0 X10E3/UL (ref 0–0.2)
BASOPHILS NFR BLD AUTO: 0 %
BILIRUB SERPL-MCNC: 0.6 MG/DL (ref 0–1.2)
BUN SERPL-MCNC: 13 MG/DL (ref 6–24)
BUN/CREAT SERPL: 15 (ref 9–23)
CALCIUM SERPL-MCNC: 9.8 MG/DL (ref 8.7–10.2)
CHLORIDE SERPL-SCNC: 100 MMOL/L (ref 96–106)
CHOLEST SERPL-MCNC: 141 MG/DL (ref 100–199)
CO2 SERPL-SCNC: 25 MMOL/L (ref 20–29)
CREAT SERPL-MCNC: 0.84 MG/DL (ref 0.57–1)
EOSINOPHIL # BLD AUTO: 0.2 X10E3/UL (ref 0–0.4)
EOSINOPHIL NFR BLD AUTO: 2 %
ERYTHROCYTE [DISTWIDTH] IN BLOOD BY AUTOMATED COUNT: 13.5 % (ref 11.7–15.4)
EST. AVERAGE GLUCOSE BLD GHB EST-MCNC: 148 MG/DL
GLOBULIN SER CALC-MCNC: 3.5 G/DL (ref 1.5–4.5)
GLUCOSE SERPL-MCNC: 116 MG/DL (ref 65–99)
HBA1C MFR BLD: 6.8 % (ref 4.8–5.6)
HCT VFR BLD AUTO: 39.9 % (ref 34–46.6)
HDLC SERPL-MCNC: 43 MG/DL
HGB BLD-MCNC: 13.4 G/DL (ref 11.1–15.9)
IMM GRANULOCYTES # BLD AUTO: 0 X10E3/UL (ref 0–0.1)
IMM GRANULOCYTES NFR BLD AUTO: 0 %
INTERPRETATION, 910389: NORMAL
LDLC SERPL CALC-MCNC: 83 MG/DL (ref 0–99)
LYMPHOCYTES # BLD AUTO: 2 X10E3/UL (ref 0.7–3.1)
LYMPHOCYTES NFR BLD AUTO: 20 %
Lab: NORMAL
MCH RBC QN AUTO: 26.3 PG (ref 26.6–33)
MCHC RBC AUTO-ENTMCNC: 33.6 G/DL (ref 31.5–35.7)
MCV RBC AUTO: 78 FL (ref 79–97)
MONOCYTES # BLD AUTO: 0.5 X10E3/UL (ref 0.1–0.9)
MONOCYTES NFR BLD AUTO: 5 %
NEUTROPHILS # BLD AUTO: 7 X10E3/UL (ref 1.4–7)
NEUTROPHILS NFR BLD AUTO: 73 %
PLATELET # BLD AUTO: 407 X10E3/UL (ref 150–450)
POTASSIUM SERPL-SCNC: 4.2 MMOL/L (ref 3.5–5.2)
PROT SERPL-MCNC: 7.7 G/DL (ref 6–8.5)
RBC # BLD AUTO: 5.09 X10E6/UL (ref 3.77–5.28)
SODIUM SERPL-SCNC: 138 MMOL/L (ref 134–144)
TRIGL SERPL-MCNC: 76 MG/DL (ref 0–149)
TSH SERPL DL<=0.005 MIU/L-ACNC: 2.34 UIU/ML (ref 0.45–4.5)
VLDLC SERPL CALC-MCNC: 15 MG/DL (ref 5–40)
WBC # BLD AUTO: 9.6 X10E3/UL (ref 3.4–10.8)

## 2020-02-04 ENCOUNTER — HOSPITAL ENCOUNTER (OUTPATIENT)
Dept: NUTRITION | Age: 50
Discharge: HOME | End: 2020-02-04
Payer: COMMERCIAL

## 2020-02-04 PROCEDURE — 97802 MEDICAL NUTRITION INDIV IN: CPT | Performed by: DIETITIAN, REGISTERED

## 2020-02-04 NOTE — PROGRESS NOTES
[id number] [facility]     Nutrition Assessment  Medical Nutrition Therapy   Outpatient Initial Evaluation         Patient Name: Moises Friend : 1970   Treatment Diagnosis: Mixed hyperlipidemia, Prediabetes, Morbid (severe) obesity due to excess calories, Body mass index (BMI) 40.0-44.9, adult   Referral Source: [facility]): 2020     Gender: female Age: 47 y.o. Ht: 67 in Wt:   280.2 lb  kg   BMI: 43.9 BMR   Male  BMR Female 1924   Per Pt inBody Scan done at [facility] 2019  BMR = 1697 kcal/day  76.3# skelletal muscle mass; BF% = 53.3%  Past Medical History: Altered mental status, unspecified  Convulsion, obesity, syncope and collapse, HTN, high cholesterol, obesity, pre-diabets  Wt Readings from Last 3 Encounters:   20 125.6 kg (276 lb 14.4 oz)   20 126.6 kg (279 lb)   19 126.1 kg (278 lb)        Pertinent Medications:     Current Outpatient Medications:     atorvastatin (LIPITOR) 40 mg tablet, Take 40 mg by mouth., Disp: , Rfl:     levonorgestreL (MIRENA) 20 mcg/24 hours (5 yrs) 52 mg IUD, 1 Device by IntraUTERine route once., Disp: , Rfl:     clobetasoL (TEMOVATE) 0.05 % topical cream, Apply  to affected area two (2) times a day., Disp: , Rfl:     olmesartan-hydroCHLOROthiazide (BENICAR HCT) 20-12.5 mg per tablet, Take 1 Tab by mouth daily. , Disp: 90 Tab, Rfl: 1    metFORMIN (GLUCOPHAGE) 1,000 mg tablet, Take 1,000 mg by mouth two (2) times daily (with meals). , Disp: , Rfl:     metoprolol tartrate (LOPRESSOR) 50 mg tablet, Take 1 Tab by mouth two (2) times a day., Disp: 180 Tab, Rfl: 1     Biochemical Data:   Lab Results   Component Value Date/Time    Hemoglobin A1c 6.8 (H) 2020 11:34 AM     Lab Results   Component Value Date/Time    Sodium 138 2020 11:34 AM    Potassium 4.2 2020 11:34 AM    Chloride 100 2020 11:34 AM    CO2 25 2020 11:34 AM    Anion gap 6 02/23/2018 01:00 PM    Glucose 116 (H) 01/27/2020 11:34 AM    BUN 13 01/27/2020 11:34 AM    Creatinine 0.84 01/27/2020 11:34 AM    BUN/Creatinine ratio 15 01/27/2020 11:34 AM    GFR est AA 94 01/27/2020 11:34 AM    GFR est non-AA 81 01/27/2020 11:34 AM    Calcium 9.8 01/27/2020 11:34 AM    Bilirubin, total 0.6 01/27/2020 11:34 AM    AST (SGOT) 14 01/27/2020 11:34 AM    Alk. phosphatase 107 01/27/2020 11:34 AM    Protein, total 7.7 01/27/2020 11:34 AM    Albumin 4.2 01/27/2020 11:34 AM    Globulin 4.8 (H) 02/23/2018 01:00 PM    A-G Ratio 1.2 01/27/2020 11:34 AM    ALT (SGPT) 18 01/27/2020 11:34 AM     Lab Results   Component Value Date/Time    Cholesterol, total 141 01/27/2020 11:34 AM    HDL Cholesterol 43 01/27/2020 11:34 AM    LDL, calculated 83 01/27/2020 11:34 AM    VLDL, calculated 15 01/27/2020 11:34 AM    Triglyceride 76 01/27/2020 11:34 AM     Lab Results   Component Value Date/Time    VITAMIN D, 25-HYDROXY 29.4 (L) 01/27/2020 11:34 AM       BP Readings from Last 3 Encounters:   01/27/20 136/84   01/04/20 177/82   11/29/19 130/60        Assessment:   Pt is a 53yo female who 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 She has made changes to her diet prior to today to address cholesterol labs. New labs are lower than previous. Reviewed today. Recently diagnosed with pre-diabetes. Notes family history of early death and health issues. Lives alone. High stress job. Not comfortable with cooking. No exercise plan currently. Pt trying to loose weight for about 6 months without success. Food & Nutrition: Changes made before today: less chips, less sodas (diet in past), less sweet tea (regular was 5x/wk), less eating out fast food (previously most days), less snacking out of stress. Increased vegetable intake, increased water intake. Currently eating 2 meals per day, no snacks. Drinking water to avoid hunger. Removed less healthy snacks and did not replace with other food items.  Currently consuming <1000kcal per day most days per recall. Does not like many vegetables. Was eating vegetables 5 days per week and fruit less. Open to making changes. Estimate Needs   Calories: 5419-9252  Protein: 119 Carbs: 214 Fat: 63   Kcal/day  g/day  g/day  g/day        percent: 25  45  30               Nutrition Diagnosis Food and nutrition related knowledge deficit R/T lack of prior education for pre-diabetes and healthy weight loss AEB pt questions today  Morbid obesity R/T previous excessive energy intake from fast food, chips, sodas, and sweet tea. Hx of stress eating AEB BMI>40  Morbid obesity R/t physical inactivity compared to goal AEB no current physical activity with goal of 150min per week       Nutrition Intervention &  Education: Educated pt on the pathophysiology of pre Diabetes, insulin resistance and the rationale for dietary modifications and increased activity. Educated pt on lean proteins, healthy fats, non-starchy vegetables, and complex carbohydrate food sources. Discussed limiting carbohydrates, label reading, meal timing, and appropriate serving sizes. Used balanced plate to create meal and snack ideas.     Handouts Provided: [x]  Carbohydrates  [x]  Protein  [x]  Non-starchy Vegatbles  []  Food Label  []  Meal and Snack Ideas  []  Food Journals []  Diabetes  []  Cholesterol  []  Sodium  [x]  Gen Nutr Guidelines  []  SBGM Guidelines  []  Others:   Information Reviewed with: pt   Readiness to Change Stage: []  Pre-contemplative    []  Contemplative  [x]  Preparation               []  Action                  []  Maintenance   Potential Barriers to Learning: []  Decline in memory    []  Language barrier   []  Other:  [x]  Emotional/stress                  []  Limited mobility  []  Lack of motivation     [] Vision, hearing or cognitive impairment   Expected Compliance: Good      Nutritional Goal - To promote lifestyle changes to result in:    [x]  Weight loss  [x]  Improved diabetic control  [x]  Decreased cholesterol levels  [x]  Decreased blood pressure  []  Weight maintenance []  Preventing any interactions associated with food allergies  []  Adequate weight gain toward goal weight  []  Other:        Patient Goals:   - Improve consistency of CHO intake w/goal to plan meals using balanced plate provided for 3 meals and 1-2 snacks per day  -eat breakfast 3 days per week (ideas provided)  -pack a snack for after school/before dinner 5 days per week  - Improve physical activity w/goal to walk for 10 minutes 3 times per week.  Increase as able   Total Time Spent: 90 min    Dietitian Signature: Lucille Wilson MS, RD, CSSD Date: 2/4/2020   Follow-up: 4 weeks Time: 4:00 PM

## 2020-02-05 ENCOUNTER — APPOINTMENT (OUTPATIENT)
Dept: NUTRITION | Age: 50
End: 2020-02-05
Payer: COMMERCIAL

## 2020-02-06 NOTE — PROGRESS NOTES
Results reviewed and letter sent. Your blood sugar level is now in the diabetes range (A1C 6.8%). I see you have met with the nutritionist. Keep taking the metformin 1000 mg twice daily. Let's recheck the blood work in 3 months after you have made dietary changes to see if the blood sugar has improved before adding a second medication. Your cholesterol looks good, keep taking the atorvastatin 40 mg daily. Your thyroid level is normal.    Vitamin D level is right at the normal range. No need for prescription medicine, but I recommend taking Vitamin D3 1000 units daily that you get over-the-counter, taken as a long term supplement.

## 2020-03-04 ENCOUNTER — HOSPITAL ENCOUNTER (OUTPATIENT)
Dept: NUTRITION | Age: 50
Discharge: HOME | End: 2020-03-04
Payer: COMMERCIAL

## 2020-03-04 PROCEDURE — 97803 MED NUTRITION INDIV SUBSEQ: CPT | Performed by: DIETITIAN, REGISTERED

## 2020-03-04 NOTE — PROGRESS NOTES
NUTRITION  FOLLOW-UP TREATMENT NOTE  Patient Name: Esvin Macias         Date: 3/4/2020  : 1970    YES/NO Patient  Verified  Diagnosis:Mixed hyperlipidemia, Prediabetes, Morbid (severe) obesity due to excess calories, Body mass index (BMI) 40.0-44.9, adult   In time:  12:00pm           Out time:   12:30pm   Total Treatment Time (min):   30     SUBJECTIVE/ASSESSMENT  Current Wt: 273.6 Previous Wt: 280.2 Wt Change: -6.6     Initial Wt: 280.2 Total Wt change: -6.6 Height: 67     Changes in medication or medical history? Any new allergies, surgeries or procedures? /NO    If yes, update Summary List   NA     Nutrition Diagnosis        Diagnosis Status: Food and nutrition related knowledge deficit R/T lack of prior education for pre-diabetes and healthy weight loss AEB pt questions today  [x]  Improved []  No Change    []  Declined   []  Discontinued     Morbid obesity R/T previous excessive energy intake from fast food, chips, sodas, and sweet tea. Hx of stress eating AEB BMI>40    [x]  Improved []  No Change    []  Declined   []  Discontinued   Morbid obesity R/t physical inactivity compared to goal AEB no current physical activity with goal of 150min per week      [x]  Improved []  No Change    []  Declined   []  Discontinued        Nutrition Monitoring and Evaluation: Improved. Continued. - Improve consistency of CHO intake w/goal to plan meals using balanced plate provided for 3 meals and 1-2 snacks per day  Met. Continued. -eat breakfast 3 days per week (ideas provided)  Met. Continued. -pack a snack for after school/before dinner 5 days per week  Met. Continued. - Improve physical activity w/goal to walk for 10 minutes 3 times per week. Increase as able    Notes instance over last week of heightened stress at work leading to poor food choices used for comfort. She was aware and had now put in place a plan to avoid turning to food in the future. Provided positive support.    She notes clothes fitting better, more energy, better mood when having breakfast and snacks, sleeping better, and less knee pain.       Nutrition Prescription and  Intervention Meal plan using balanced plate  Planning meals to avoid skipping meals  Working on strategies for stress and coping skills instead of using food for comfort  Increasing exercise towards 150min per week minimum     Patient Education:  [x]  Review current plan with patient   []  Other:    Handouts/  Information Provided: []  Carbohydrates  []  Protein  []  Fiber  []  Serving Sizes  []  Fluids  []  General guidelines []  Diabetes  []  Cholesterol  []  Sodium  []  SBGM  []  Food Journals  []  Others:      Patient Goals -walk for exercise 3-4 times per week for 15 min minimm  -water intake daily 8-10 glasses throughout  -continue packing breakfast and snacks for during work  -plan for stress: have a snack before, walk away, don't bring money for vending machine, cut off work time at [employer]  [x]  Continue on current plan []  Follow-up PRN   []  Discharge due to :    [x]  Next appt: 4 weeks (2 of 3 visits)     Dietitian: Shannon Marroquin MS, RD, CSSD    Date: 3/4/2020 Time: 6:02 PM

## 2020-08-22 DIAGNOSIS — I10 ESSENTIAL HYPERTENSION: ICD-10-CM

## 2020-08-24 RX ORDER — OLMESARTAN MEDOXOMIL AND HYDROCHLOROTHIAZIDE 20/12.5 20; 12.5 MG/1; MG/1
TABLET ORAL
Qty: 90 TAB | Refills: 1 | Status: SHIPPED | OUTPATIENT
Start: 2020-08-24 | End: 2021-01-11

## 2020-08-31 ENCOUNTER — HOSPITAL ENCOUNTER (OUTPATIENT)
Dept: MAMMOGRAPHY | Age: 50
Discharge: HOME | End: 2020-08-31
Payer: COMMERCIAL

## 2020-08-31 DIAGNOSIS — Z12.31 VISIT FOR SCREENING MAMMOGRAM: ICD-10-CM

## 2020-08-31 PROCEDURE — 77063 BREAST TOMOSYNTHESIS BI: CPT

## 2020-09-09 ENCOUNTER — HOSPITAL ENCOUNTER (OUTPATIENT)
Dept: MAMMOGRAPHY | Age: 50
Discharge: HOME | End: 2020-09-09
Payer: COMMERCIAL

## 2020-09-09 DIAGNOSIS — R92.8 ABNORMAL MAMMOGRAM OF RIGHT BREAST: ICD-10-CM

## 2020-09-09 PROCEDURE — 76642 ULTRASOUND BREAST LIMITED: CPT

## 2020-09-09 PROCEDURE — 77061 BREAST TOMOSYNTHESIS UNI: CPT

## 2020-09-11 RX ORDER — METOPROLOL TARTRATE 50 MG/1
50 TABLET ORAL 2 TIMES DAILY
Qty: 180 TAB | Refills: 0 | Status: SHIPPED | OUTPATIENT
Start: 2020-09-11 | End: 2021-07-21 | Stop reason: ALTCHOICE

## 2020-09-11 NOTE — TELEPHONE ENCOUNTER
Requested Prescriptions     Signed Prescriptions Disp Refills    metoprolol tartrate (LOPRESSOR) 50 mg tablet 180 Tab 0     Sig: Take 1 Tab by mouth two (2) times a day. Please schedule follow up for further refills. Authorizing Provider: Aden Laguerre     Ordering User: Leonel Green     Verbal order per Dr. Raf Goldstein.

## 2020-09-14 ENCOUNTER — TELEPHONE (OUTPATIENT)
Dept: FAMILY MEDICINE CLINIC | Age: 50
End: 2020-09-14

## 2020-09-14 DIAGNOSIS — R92.8 ABNORMALITY OF RIGHT BREAST ON SCREENING MAMMOGRAM: Primary | ICD-10-CM

## 2020-09-14 NOTE — TELEPHONE ENCOUNTER
Please call pt - inform I saw she had extra studies for her mammogram, a repeat of the right breast only is recommended in 6 mos for probably benign findings. Please ask her if she has had her annual visit with Gyn Dr Sandra Ortiz to include breast exam, and to schedule now if she is overdue. She may likely schedule the 6 mo mammogram appt now as well.

## 2020-09-16 NOTE — TELEPHONE ENCOUNTER
Returned phone call to pt, gave message left by PCP. Pt states that she would like a second opinion for her MAMM being that when she had her last MAMM it was abnormal and that she waited 2 hours and was told that they seen something but was unsure of what it may have been. Pt states that she is not convinced and would like a second opinion. Advised that I will get message to PCP.

## 2020-09-16 NOTE — TELEPHONE ENCOUNTER
Please inform pt I have referred her to breast specialist Dr Shanda Giles, she should schedule appt to review mammogram findings with her, please give referral info.

## 2020-09-18 NOTE — TELEPHONE ENCOUNTER
Called pt to give referral information. Left a voice message for a call back to the office to discuss.

## 2020-09-21 ENCOUNTER — TELEPHONE (OUTPATIENT)
Dept: FAMILY MEDICINE CLINIC | Age: 50
End: 2020-09-21

## 2020-10-01 NOTE — PROGRESS NOTES
HISTORY OF PRESENT ILLNESS  Cyn Babcock is a 45 y.o. female. HPI NEW Patient presents for consultation at the request of Mimi Cardoso PA-C, for RIGHT breast mass. This was the first time that she had an abnormal mammogram.     Family history-  Mother had bone cancer. . Maternal aunt diagnosed with breast cancer in her 66's. Maternal cousin had breast cancer in her 42's. Breast imaging-  [facility] Results (most recent):  Results from Hospital Encounter encounter on 20   [facility] 3D BRY W MAMMO RT DX INCL CAD    Narrative ADDITIONAL HISTORY: Diagnostic call-back for focal asymmetry in the right breast  at screening 2020. COMPARISON: Prior studies dating back to  most recently 2020 . EXAM: Digital Diagnostic Mammography  unilateral right. Digital tomography was  performed. .Breast Ultrasonography unilateral right. FINDINGS:     There are scattered fibroglandular densities (approximately 25-50% glandular). The focal areas of asymmetry  described on the screening examination 2020   R examined tomographically, with one discrete subcentimeter oval lobular mass  persisting at 9:00 mid depth. A second density is not definitely confirmed. Real-time ultrasonography of the right outer breast reveals an oval parallel  mass at 9:00, 10 cm from the nipple measuring 0.9 x 0.6 cm, with a central fatty  hilum and minimal central blood flow. .      Impression IMPRESSION:  1. Assessment Category 3: Probably benign finding. Short-interval follow-up  suggested. 2. Probable intramammary lymph node in the right breast at 9:00, most likely  corresponding with in the predominant mammographic abnormality confirmed  tomographically. 3. Followup mammography of the right breast only is recommended in 6 months to  confirm concordance between mammographic and sonographic findings and  stability. .  4. These findings have been discussed with the patient.      US Results (most recent):  Results from Hospital Encounter encounter on 09/09/20   US BREAST RT LIMITED=<3 QUAD    Narrative ADDITIONAL HISTORY: Diagnostic call-back for focal asymmetry in the right breast  at screening 8/31/2020. COMPARISON: Prior studies dating back to 2009 most recently 8/31/2020 . EXAM: Digital Diagnostic Mammography  unilateral right. Digital tomography was  performed. .Breast Ultrasonography unilateral right. FINDINGS:     There are scattered fibroglandular densities (approximately 25-50% glandular). The focal areas of asymmetry  described on the screening examination 8/31/2020   R examined tomographically, with one discrete subcentimeter oval lobular mass  persisting at 9:00 mid depth. A second density is not definitely confirmed. Real-time ultrasonography of the right outer breast reveals an oval parallel  mass at 9:00, 10 cm from the nipple measuring 0.9 x 0.6 cm, with a central fatty  hilum and minimal central blood flow. .      Impression IMPRESSION:  1. Assessment Category 3: Probably benign finding. Short-interval follow-up  suggested. 2. Probable intramammary lymph node in the right breast at 9:00, most likely  corresponding with in the predominant mammographic abnormality confirmed  tomographically. 3. Followup mammography of the right breast only is recommended in 6 months to  confirm concordance between mammographic and sonographic findings and  stability. .  4. These findings have been discussed with the patient. Review of Systems   Constitutional: Negative. HENT: Negative. Eyes: Negative. Respiratory: Negative. Cardiovascular: Negative. Gastrointestinal: Negative. Genitourinary: Negative. Musculoskeletal: Negative. Skin: Negative. Neurological: Negative. Endo/Heme/Allergies: Negative. Psychiatric/Behavioral: Negative. All other systems reviewed and are negative. Physical Exam  Vitals signs and nursing note reviewed.    Constitutional:       Appearance: She is well-developed. HENT:      Head: Normocephalic. Neck:      Thyroid: No thyromegaly. Pulmonary:      Effort: Pulmonary effort is normal.   Chest:      Breasts: Breasts are symmetrical.         Right: No inverted nipple, mass, nipple discharge, skin change or tenderness. Left: No inverted nipple, mass, nipple discharge, skin change or tenderness. Abdominal:      Palpations: Abdomen is soft. Musculoskeletal: Normal range of motion. Lymphadenopathy:      Cervical: No cervical adenopathy. Skin:     General: Skin is warm and dry. Neurological:      Mental Status: She is alert and oriented to person, place, and time. ASSESSMENT and PLAN    ICD-10-CM ICD-9-CM    1. BI-RADS category 3 mammogram result  R92.8 793.82      - agree with 6 months follow-up, likely benign intramammary node  - f/u in 6 months after imaging.

## 2020-10-02 ENCOUNTER — OFFICE VISIT (OUTPATIENT)
Dept: SURGERY | Age: 50
End: 2020-10-02
Payer: COMMERCIAL

## 2020-10-02 VITALS
HEIGHT: 67 IN | WEIGHT: 276 LBS | BODY MASS INDEX: 43.32 KG/M2 | TEMPERATURE: 96.8 F | DIASTOLIC BLOOD PRESSURE: 69 MMHG | HEART RATE: 69 BPM | SYSTOLIC BLOOD PRESSURE: 128 MMHG

## 2020-10-02 DIAGNOSIS — R92.8 BI-RADS CATEGORY 3 MAMMOGRAM RESULT: Primary | ICD-10-CM

## 2020-10-02 PROCEDURE — 99202 OFFICE O/P NEW SF 15 MIN: CPT | Performed by: SURGERY

## 2020-10-02 NOTE — LETTER
10/2/20 Patient: Marta Suarez YOB: 1970 Date of Visit: 10/2/2020 Court Martinez, [address] [facility] [address] [place] [address] VIA In Basket Dear Court Martinez PA-C, Thank you for referring Ms. Memo Nicholas to [address] [facility] [place] MAIN OFFICE [address] [place] [address] for evaluation. My notes for this consultation are attached. If you have questions, please do not hesitate to call me. I look forward to following your patient along with you. Sincerely, Alesia Knight MD

## 2020-10-02 NOTE — PATIENT INSTRUCTIONS
Mammogram: About This Test  What is it? A mammogram is an X-ray of the breast that is used to screen for breast cancer. This test can find tumors that are too small for you or your doctor to feel. Cancer is most easily treated when it is found at an early stage. Why is this test done? A mammogram is done to:  · Look for breast cancer in women who don't have symptoms. · Find breast cancer in women who have symptoms. Symptoms of breast cancer may include a lump or thickening in the breast, nipple discharge, or dimpling of the skin on one area of the breast.  · Find an area of suspicious breast tissue to remove for an exam under a microscope (biopsy). How do you prepare for the test?  If you've had a mammogram before at another clinic, have the results sent or bring them with you to your appointment. On the day of the mammogram, don't use any deodorant. And don't use perfume, powders, or ointments near or on your breasts. The residue left on your skin by these substances may interfere with the X-rays. How is the test done? · You will need to take off any jewelry that might interfere with the X-ray pictures. · You will need to take off your clothes above the waist.  · You will be given a cloth or paper gown to use during the test.  · You probably will stand during the mammogram.  · One at a time, your breasts will be placed on a flat plate. · Another plate is then pressed firmly against your breast to help flatten out the breast tissue. You may be asked to lift your arm. · For a few seconds while the X-ray picture is being taken, you will need to hold your breath. · At least two pictures are taken of each breast. One is taken from the top and one from the side. How does having a mammogram feel? A mammogram is often uncomfortable but rarely painful.  If you have sensitive or fragile skin or a skin condition, let the technician know before you have your exam. If you have menstrual periods, the procedure is more comfortable when done within 2 weeks after your period has ended. Having your breasts flattened is usually uncomfortable, but it helps the technician get the best images. How long does the test take? · The test will take about 10 to 15 minutes. You may be in the clinic for up to an hour. · You may be asked to wait a few minutes while the images are checked to make sure they don't need to be redone. What happens after the test?  · You will probably be able to go home right away. · You can go back to your usual activities right away. Follow-up care is a key part of your treatment and safety. Be sure to make and go to all appointments, and call your doctor if you are having problems. It's also a good idea to keep a list of the medicines you take. Ask your doctor when you can expect to have your test results. Where can you learn more? Go to http://www.kaplan.com/  Enter Z238 in the search box to learn more about \"Mammogram: About This Test.\"  Current as of: April 29, 2020               Content Version: 12.6  © 9081-5208 [employer]. Care instructions adapted under license by [employer] (which disclaims liability or warranty for this information). If you have questions about a medical condition or this instruction, always ask your healthcare professional. [address] any warranty or liability for your use of this information.

## 2020-12-18 RX ORDER — METOPROLOL TARTRATE 50 MG/1
50 TABLET ORAL 2 TIMES DAILY
Qty: 180 TAB | Refills: 0 | OUTPATIENT
Start: 2020-12-18

## 2021-03-08 ENCOUNTER — HOSPITAL ENCOUNTER (OUTPATIENT)
Dept: MAMMOGRAPHY | Age: 51
Discharge: HOME | End: 2021-03-08
Attending: SURGERY
Payer: COMMERCIAL

## 2021-03-08 DIAGNOSIS — R92.8 BI-RADS CATEGORY 3 MAMMOGRAM RESULT: ICD-10-CM

## 2021-03-08 PROCEDURE — 77061 BREAST TOMOSYNTHESIS UNI: CPT

## 2021-04-09 ENCOUNTER — OFFICE VISIT (OUTPATIENT)
Dept: SURGERY | Age: 51
End: 2021-04-09
Payer: COMMERCIAL

## 2021-04-09 VITALS
HEART RATE: 58 BPM | DIASTOLIC BLOOD PRESSURE: 76 MMHG | WEIGHT: 270 LBS | SYSTOLIC BLOOD PRESSURE: 133 MMHG | BODY MASS INDEX: 42.38 KG/M2 | TEMPERATURE: 97.8 F | HEIGHT: 67 IN

## 2021-04-09 DIAGNOSIS — N64.9 BREAST LESION: Primary | ICD-10-CM

## 2021-04-09 PROCEDURE — 99213 OFFICE O/P EST LOW 20 MIN: CPT | Performed by: SURGERY

## 2021-04-09 NOTE — PROGRESS NOTES
HISTORY OF PRESENT ILLNESS  Narcisa Dickson is a 45 y.o. female. HPI  ESTABLISHED patient here for 6 month follow up RIGHT breast mass. She had an abnormal mammogram.  Denies palpable lumps. No pain. Family history-  Mother had bone cancer. . Maternal aunt diagnosed with breast cancer in her 66's. Maternal cousin had breast cancer in her 42's. RIGHT breast dx 6 month follow up mammogram, 3/8/21, BIRADS 2  Review of Systems   All other systems reviewed and are negative. Physical Exam  Vitals signs and nursing note reviewed. Chest:      Breasts: Breasts are symmetrical.         Right: No inverted nipple, mass, nipple discharge, skin change or tenderness. Left: No inverted nipple, mass, nipple discharge, skin change or tenderness. Lymphadenopathy:      Cervical: No cervical adenopathy. ASSESSMENT and PLAN    ICD-10-CM ICD-9-CM    1. Breast lesion  N64.9 611.9      -unchanged benign node on mammogram  Normal  F/u as needed  20 minutes was spent with patient on counseling and coordination of care.

## 2021-04-09 NOTE — LETTER
4/9/2021 Patient: Pina Resendez YOB: 1970 Date of Visit: 4/9/2021 Court Martinez, [address] [facility] [address] [place] [address] Via In [employer] Dear Court Martinez, PAHughC, Thank you for referring Ms. Rhonda Moore to [address] MAIN OFFICE [address] [facility] for evaluation. My notes for this consultation are attached. If you have questions, please do not hesitate to call me. I look forward to following your patient along with you. Sincerely, Gi Pickens MD

## 2021-04-09 NOTE — PATIENT INSTRUCTIONS

## 2021-07-15 ENCOUNTER — OFFICE VISIT (OUTPATIENT)
Dept: INTERNAL MEDICINE CLINIC | Age: 51
End: 2021-07-15
Payer: COMMERCIAL

## 2021-07-15 VITALS
BODY MASS INDEX: 43.79 KG/M2 | OXYGEN SATURATION: 97 % | HEIGHT: 67 IN | SYSTOLIC BLOOD PRESSURE: 130 MMHG | WEIGHT: 279 LBS | RESPIRATION RATE: 16 BRPM | HEART RATE: 76 BPM | DIASTOLIC BLOOD PRESSURE: 84 MMHG | TEMPERATURE: 97.7 F

## 2021-07-15 DIAGNOSIS — Z12.11 COLON CANCER SCREENING: ICD-10-CM

## 2021-07-15 DIAGNOSIS — I10 ESSENTIAL HYPERTENSION: ICD-10-CM

## 2021-07-15 DIAGNOSIS — Z11.59 NEED FOR HEPATITIS C SCREENING TEST: ICD-10-CM

## 2021-07-15 DIAGNOSIS — F44.5 PSEUDOSEIZURE: ICD-10-CM

## 2021-07-15 DIAGNOSIS — E78.2 MIXED HYPERLIPIDEMIA: ICD-10-CM

## 2021-07-15 DIAGNOSIS — I51.7 LVH (LEFT VENTRICULAR HYPERTROPHY): ICD-10-CM

## 2021-07-15 DIAGNOSIS — E11.9 CONTROLLED TYPE 2 DIABETES MELLITUS WITHOUT COMPLICATION, WITHOUT LONG-TERM CURRENT USE OF INSULIN (HCC): Primary | ICD-10-CM

## 2021-07-15 DIAGNOSIS — E66.01 CLASS 3 SEVERE OBESITY DUE TO EXCESS CALORIES WITH SERIOUS COMORBIDITY AND BODY MASS INDEX (BMI) OF 40.0 TO 44.9 IN ADULT (HCC): ICD-10-CM

## 2021-07-15 DIAGNOSIS — E55.9 VITAMIN D DEFICIENCY: ICD-10-CM

## 2021-07-15 DIAGNOSIS — R92.8 BREAST LESION ON MAMMOGRAPHY: ICD-10-CM

## 2021-07-15 PROCEDURE — 99214 OFFICE O/P EST MOD 30 MIN: CPT | Performed by: PHYSICIAN ASSISTANT

## 2021-07-15 RX ORDER — METFORMIN HYDROCHLORIDE 500 MG/1
TABLET, EXTENDED RELEASE ORAL
Qty: 120 TABLET | Refills: 0 | Status: SHIPPED | OUTPATIENT
Start: 2021-07-15 | End: 2021-07-18 | Stop reason: SDUPTHER

## 2021-07-15 RX ORDER — OLMESARTAN MEDOXOMIL AND HYDROCHLOROTHIAZIDE 20/12.5 20; 12.5 MG/1; MG/1
1 TABLET ORAL DAILY
Qty: 30 TABLET | Refills: 0 | Status: SHIPPED | OUTPATIENT
Start: 2021-07-15 | End: 2021-07-18 | Stop reason: SDUPTHER

## 2021-07-15 RX ORDER — ATORVASTATIN CALCIUM 40 MG/1
40 TABLET, FILM COATED ORAL DAILY
Qty: 30 TABLET | Refills: 0 | Status: SHIPPED | OUTPATIENT
Start: 2021-07-15 | End: 2021-07-18 | Stop reason: SDUPTHER

## 2021-07-15 NOTE — PATIENT INSTRUCTIONS
Learning About Carbohydrate (Carb) Counting and Eating Out When You Have Diabetes  Why plan your meals? Meal planning can be a key part of managing diabetes. Planning meals and snacks with the right balance of carbohydrate, protein, and fat can help you keep your blood sugar at the target level you set with your doctor. You don't have to eat special foods. You can eat what your family eats, including sweets once in a while. But you do have to pay attention to how often you eat and how much you eat of certain foods. You may want to work with a dietitian or a certified diabetes educator. He or she can give you tips and meal ideas and can answer your questions about meal planning. This health professional can also help you reach a healthy weight if that is one of your goals. What should you know about eating carbs? Managing the amount of carbohydrate (carbs) you eat is an important part of healthy meals when you have diabetes. Carbohydrate is found in many foods. · Learn which foods have carbs. And learn the amounts of carbs in different foods. ? Bread, cereal, pasta, and rice have about 15 grams of carbs in a serving. A serving is 1 slice of bread (1 ounce), ½ cup of cooked cereal, or 1/3 cup of cooked pasta or rice. ? Fruits have 15 grams of carbs in a serving. A serving is 1 small fresh fruit, such as an apple or orange; ½ of a banana; ½ cup of cooked or canned fruit; ½ cup of fruit juice; 1 cup of melon or raspberries; or 2 tablespoons of dried fruit. ? Milk and no-sugar-added yogurt have 15 grams of carbs in a serving. A serving is 1 cup of milk or 2/3 cup of no-sugar-added yogurt. ? Starchy vegetables have 15 grams of carbs in a serving. A serving is ½ cup of mashed potatoes or sweet potato; 1 cup winter squash; ½ of a small baked potato; ½ cup of cooked beans; or ½ cup cooked corn or green peas.   · Learn how much carbs to eat each day and at each meal. A dietitian or CDE can teach you how to keep track of the amount of carbs you eat. This is called carbohydrate counting. · If you are not sure how to count carbohydrate grams, use the Plate Method to plan meals. It is a good, quick way to make sure that you have a balanced meal. It also helps you spread carbs throughout the day. ? Divide your plate by types of foods. Put non-starchy vegetables on half the plate, meat or other protein food on one-quarter of the plate, and a grain or starchy vegetable in the final quarter of the plate. To this you can add a small piece of fruit and 1 cup of milk or yogurt, depending on how many carbs you are supposed to eat at a meal.  · Try to eat about the same amount of carbs at each meal. Do not \"save up\" your daily allowance of carbs to eat at one meal.  · Proteins have very little or no carbs per serving. Examples of proteins are beef, chicken, turkey, fish, eggs, tofu, cheese, cottage cheese, and peanut butter. A serving size of meat is 3 ounces, which is about the size of a deck of cards. Examples of meat substitute serving sizes (equal to 1 ounce of meat) are 1/4 cup of cottage cheese, 1 egg, 1 tablespoon of peanut butter, and ½ cup of tofu. How can you eat out and still eat healthy? · Learn to estimate the serving sizes of foods that have carbohydrate. If you measure food at home, it will be easier to estimate the amount in a serving of restaurant food. · If the meal you order has too much carbohydrate (such as potatoes, corn, or baked beans), ask to have a low-carbohydrate food instead. Ask for a salad or green vegetables. · If you use insulin, check your blood sugar before and after eating out to help you plan how much to eat in the future. · If you eat more carbohydrate at a meal than you had planned, take a walk or do other exercise. This will help lower your blood sugar. What are some tips for eating healthy? · Limit saturated fat, such as the fat from meat and dairy products.  This is a healthy choice because people who have diabetes are at higher risk of heart disease. So choose lean cuts of meat and nonfat or low-fat dairy products. Use olive or canola oil instead of butter or shortening when cooking. · Don't skip meals. Your blood sugar may drop too low if you skip meals and take insulin or certain medicines for diabetes. · Check with your doctor before you drink alcohol. Alcohol can cause your blood sugar to drop too low. Alcohol can also cause a bad reaction if you take certain diabetes medicines. Follow-up care is a key part of your treatment and safety. Be sure to make and go to all appointments, and call your doctor if you are having problems. It's also a good idea to know your test results and keep a list of the medicines you take. Where can you learn more? Go to http://www.kaplan.com/  Enter I147 in the search box to learn more about \"Learning About Carbohydrate (Carb) Counting and Eating Out When You Have Diabetes. \"  Current as of: August 31, 2020               Content Version: 12.8  © 2006-2021 [employer]. Care instructions adapted under license by [employer] (which disclaims liability or warranty for this information). If you have questions about a medical condition or this instruction, always ask your healthcare professional. [address] any warranty or liability for your use of this information.

## 2021-07-15 NOTE — PROGRESS NOTES
HPI:  45 y.o.  presents for follow up appointment. No hospital, ER or specialist visits since last primary care visit except as noted below. 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  There was mild to moderated concentric hypertrophy    6/19/2018, Dr Alfred Velazquez reviewed    H/O syncope with seizure like activity for 25+ yrs  Saw neuro Dr Dea Lobo, last visit 5/24/2018  EEG normal  Last episode 2/23/2018  Diagnosed as pseudoseizures and psychogenic syncope  Advised to see psychiatry if seizures recur    Vit D deficiency - told was low in 2018 by Gyn, reports level of 5, took RX 11/2018 - 2/2019,   -most recent D level 29.4 on 1/27/2020, she was taking MVI since then     Diabetes - initially pre-diabetes diagnosed in 2019 at [facility], run by Dr. Shepard Bence; started on metformin ER 1000 mg BID and a weight loss medication Lomaira 8 mg once dialy in 2020  -last A1C 6.8% 1/27/2020, met with nutrition, on metformin 1000 mg BID at the time, advised to return in 3 mos and today is her first visit since then  -she ran out of metformin about 12 months ago and did not refill it  -she saw nutritionist in 2020 for 3 classes, but that finished right before the pandemic started and she has not been eating healthy since then  -on ARB and statin       Elevated CHL - elevated in 2018 at [facility], started atorvastatin 40 mg, ran out of atorvastatin a few mos ago  -her diet wasn't as healthy during the pandemic  Lab Results   Component Value Date/Time    Cholesterol, total 141 01/27/2020 11:34 AM    HDL Cholesterol 43 01/27/2020 11:34 AM    LDL, calculated 83 01/27/2020 11:34 AM    VLDL, calculated 15 01/27/2020 11:34 AM    Triglyceride 76 01/27/2020 11:34 AM          GYN - sees Dr. Carie Cano, has IUD, has appt next week     Keloid right upper chest - previously saw dermatologist Dr. Veda Jimenez, on clobetasol, then had injections that didn't help, she feels this caused her abnl mammogram, so she stopped going since it wasn't helping the keloid    Patient Active Problem List    Diagnosis    Mixed hyperlipidemia    Class 3 severe obesity with serious comorbidity and body mass index (BMI) of 40.0 to 44.9 in [facility])    Syncope and collapse    Hypertension    Obesity, morbid ([address]    Altered mental status, unspecified    Convulsion ([address]         Past Medical History:   Diagnosis Date    [facility]     Heart murmur     Hypertension     Ill-defined condition     pseudo seizure       Social History     Tobacco Use    Smoking status: Never Smoker    Smokeless tobacco: Never Used   Substance Use Topics    Alcohol use: No    Drug use: No       Outpatient Medications Marked as Taking for the 7/15/21 encounter (Office Visit) with Orly Martinez PA-C   Medication Sig Dispense Refill    olmesartan-hydroCHLOROthiazide (BENICAR HCT) 20-12.5 mg per tablet Take 1 Tab by mouth daily. Office visit/labs due before next refill 30 Tab 0    atorvastatin (LIPITOR) 40 mg tablet Take 1 Tab by mouth daily. Office visit/labs due before next refill 90 Tab 0    metoprolol tartrate (LOPRESSOR) 50 mg tablet Take 1 Tab by mouth two (2) times a day. Please schedule follow up for further refills.  180 Tab 0    levonorgestreL (MIRENA) 20 mcg/24 hours (5 yrs) 52 mg IUD 1 Device by IntraUTERine route once. Allergies   Allergen Reactions    Decadron [Dexamethasone] Other (comments)     Joint swelling, rash       ROS:  ROS negative except as per HPI. PE:  Visit Vitals  /84 (BP 1 Location: Left upper arm, BP Patient Position: Sitting, BP Cuff Size: Large adult)   Pulse 76   Temp 97.7 °F (36.5 °C) (Oral)   Resp 16   Ht 5' 7\" (1.702 m)   Wt 279 lb (126.6 kg)   SpO2 97%   BMI 43.70 kg/m²     Gen: alert, oriented, no acute distress  Head: normocephalic, atraumatic  Eyes: pupils equal round reactive to light, sclera clear, conjunctiva clear  Neck: symmetric normal sized thyroid, no carotid bruits, no lymphadenopathy  Resp: no increase work of breathing, lungs clear to ausculation bilaterally, no wheezing, rales or rhonchi  CV: S1, S2 normal.  No murmurs, rubs, or gallops. Abd: soft, not tender, not distended. Normal bowel sounds. Neuro: not focal  Skin: (+)keloid R upper chest  Extremities: no cyanosis or edema    No results found for this visit on 07/15/21. Assessment/Plan:    1. Controlled type 2 diabetes mellitus without complication, without long-term current use of insulin ([address]  -last A1C 6.8% 1/27/2020, met with nutrition, on metformin 1000 mg BID at the time, advised to return in 3 mos and today is her first visit since then  -she ran out of metformin about 12 months ago and did not refill it  -on ARB, statin  -resume metformin, check labs, refer to [facility]P once all results are in  F/U 1 month  -foot exam next visit, as well as review eye exam requirements    - metFORMIN ER (GLUCOPHAGE XR) 500 mg tablet; Slowly increase up to 2 pills twice a day with meals as discussed  Dispense: 120 Tablet; Refill: 0  - LIPID PANEL; Future  - HEMOGLOBIN A1C WITH EAG; Future  - MICROALBUMIN, UR, RAND W/ MICROALB/CREAT RATIO; Future    2.  Essential hypertension  130/84, controlled  on olmesartan/HCTZ 20/12.5 daily; she is also on metoprolol 50 mg BID  Continue current regimen    - olmesartan-hydroCHLOROthiazide (BENICAR HCT) 20-12.5 mg per tablet; Take 1 Tablet by mouth daily. Labs due before next refill  Dispense: 30 Tablet; Refill: 0  - METABOLIC PANEL, COMPREHENSIVE; Future  - CBC WITH AUTOMATED DIFF; Future  - TSH 3RD GENERATION; Future    3. Mixed hyperlipidemia  Resume atorvastatin and check labs  Goal LDL,70    - atorvastatin (LIPITOR) 40 mg tablet; Take 1 Tablet by mouth daily. Labs due before next refill  Dispense: 30 Tablet; Refill: 0  - LIPID PANEL; Future    4. Vitamin D deficiency  -most recent D level 29.4 on 1/27/2020, she was taking MVI since then  - VITAMIN D, 25 HYDROXY; Future    5. Need for hepatitis C screening test    - HEPATITIS C AB; Future    6. Colon cancer screening    - REFERRAL TO GASTROENTEROLOGY    7. Pseudoseizure  Saw neuro Dr Dea Lobo, last visit 5/24/2018  EEG normal  Last episode 2/23/2018  Diagnosed as pseudoseizures and psychogenic syncope  Advised to see psychiatry if seizures recur    8. Class 3 severe obesity due to excess calories with serious comorbidity and body mass index (BMI) of 40.0 to 44.9 in adult [facility])  Reviewed diabetic, low fat diet  Increase exercise  Recommend DEP once labs are back    9. LVH (left ventricular hypertrophy)  Last visit with cards 1/11/2019, Dr Darin Rosales, note reviewed  Last echo 5/2018 EF 60%, no wall motion abnormalities. There was mild to moderated concentric hypertrophy   Has visit with DR Darin Rosales scheduled 7/21/21    10. Breast lesion on mammography  Right breast  mammo 9/2020 and 3/2021  Unchanged intermammary node  Saw Dr Tammy Dubose, nothing on exam  Has repeat mammo 9/2021    If her labs are OK, I can send in 90-day refills      Health Maintenance reviewed - updated.       Orders Placed This Encounter    METABOLIC PANEL, COMPREHENSIVE     Standing Status:   Future     Number of Occurrences:   1     Standing Expiration Date:   7/15/2022    CBC WITH AUTOMATED DIFF     Standing Status:   Future     Number of Occurrences:   1     Standing Expiration Date:   7/15/2022    VITAMIN D, 25 HYDROXY     Standing Status:   Future     Number of Occurrences:   1     Standing Expiration Date:   7/15/2022    TSH 3RD GENERATION     Standing Status:   Future     Number of Occurrences:   1     Standing Expiration Date:   7/15/2022    LIPID PANEL     Standing Status:   Future     Number of Occurrences:   1     Standing Expiration Date:   7/15/2022    HEMOGLOBIN A1C WITH EAG     Standing Status:   Future     Number of Occurrences:   1     Standing Expiration Date:   7/15/2022    MICROALBUMIN, UR, RAND W/ MICROALB/CREAT RATIO     Standing Status:   Future     Number of Occurrences:   1     Standing Expiration Date:   7/15/2022    HEPATITIS C AB     Standing Status:   Future     Number of Occurrences:   1     Standing Expiration Date:   7/15/2022   [facility]     Referral Priority:   Routine     Referral Type:   Consultation     Referral Reason:   Specialty Services Required     Referred to Provider:   Sheridan Mai MD     Number of Visits Requested:   1    atorvastatin (LIPITOR) 40 mg tablet     Sig: Take 1 Tablet by mouth daily. Labs due before next refill     Dispense:  30 Tablet     Refill:  0    olmesartan-hydroCHLOROthiazide (BENICAR HCT) 20-12.5 mg per tablet     Sig: Take 1 Tablet by mouth daily.  Labs due before next refill     Dispense:  30 Tablet     Refill:  0    metFORMIN ER (GLUCOPHAGE XR) 500 mg tablet     Sig: Slowly increase up to 2 pills twice a day with meals as discussed     Dispense:  120 Tablet     Refill:  0       Medications Discontinued During This Encounter   Medication Reason    metFORMIN (GLUCOPHAGE) 1,000 mg tablet LIST CLEANUP    atorvastatin (LIPITOR) 40 mg tablet REORDER    olmesartan-hydroCHLOROthiazide (BENICAR HCT) 20-12.5 mg per tablet REORDER       Current Outpatient Medications   Medication Sig Dispense Refill    atorvastatin (LIPITOR) 40 mg tablet Take 1 Tablet by mouth daily. Labs due before next refill 30 Tablet 0    olmesartan-hydroCHLOROthiazide (BENICAR HCT) 20-12.5 mg per tablet Take 1 Tablet by mouth daily. Labs due before next refill 30 Tablet 0    metFORMIN ER (GLUCOPHAGE XR) 500 mg tablet Slowly increase up to 2 pills twice a day with meals as discussed 120 Tablet 0    metoprolol tartrate (LOPRESSOR) 50 mg tablet Take 1 Tab by mouth two (2) times a day. Please schedule follow up for further refills. 180 Tab 0    levonorgestreL (MIRENA) 20 mcg/24 hours (5 yrs) 52 mg IUD 1 Device by IntraUTERine route once. Recommended healthy diet low in carbohydrates, fats, sodium and cholesterol. Recommended regular cardiovascular exercise 3-6 times per week for 30-60 minutes daily. Verbal and written instructions (see AVS) provided. Patient expresses understanding of diagnosis and treatment plan. Follow-up and Dispositions    · Return in about 5 weeks (around 8/19/2021) for DM; 2nd appt for fasting labs next week. Future Appointments   Date Time Provider Shereen Tam   7/21/2021  8:20 AM Brenda , MD [facility]   8/19/2021  2:00 PM Orly Martinez PA-C [facility]   9/10/2021  7:30 AM [facility]

## 2021-07-15 NOTE — PROGRESS NOTES
Wolf Smiley presents today at the clinic for     Chief Complaint   Patient presents with    Hypertension     follow up    Cholesterol Problem     follow up        Wt Readings from Last 3 Encounters:   07/15/21 279 lb (126.6 kg)   04/09/21 270 lb (122.5 kg)   10/02/20 276 lb (125.2 kg)     Temp Readings from Last 3 Encounters:   07/15/21 97.7 °F (36.5 °C) (Oral)   04/09/21 97.8 °F (36.6 °C)   10/02/20 96.8 °F (36 °C) (Temporal)     BP Readings from Last 3 Encounters:   07/15/21 130/84   04/09/21 133/76   10/02/20 128/69     Pulse Readings from Last 3 Encounters:   07/15/21 76   04/09/21 (!) 58   10/02/20 69       Health Maintenance Due   Topic    Hepatitis C Screening     COVID-19 Vaccine (1)    DTaP/Tdap/Td series (1 - Tdap)    PAP AKA CERVICAL CYTOLOGY     Colorectal Cancer Screening Combo     Shingrix Vaccine Age 50> (1 of 2)    Lipid Screen          Learning Assessment:  :     Learning Assessment 10/2/2020 2/27/2018   PRIMARY LEARNER Patient Patient   PRIMARY LANGUAGE ENGLISH ENGLISH   LEARNER PREFERENCE PRIMARY OTHER (COMMENT) READING   ANSWERED BY patient self   RELATIONSHIP SELF SELF       Depression Screening:  :     3 most recent PHQ Screens 7/15/2021   Little interest or pleasure in doing things Not at all   Feeling down, depressed, irritable, or hopeless Not at all   Total Score PHQ 2 0       Fall Risk Assessment:  :     Fall Risk Assessment, last 12 mths 7/15/2021   Able to walk? Yes   Fall in past 12 months? 0   Do you feel unsteady? 0   Are you worried about falling 0       Abuse Screening:  :     Abuse Screening Questionnaire 7/15/2021   Do you ever feel afraid of your partner? N   Are you in a relationship with someone who physically or mentally threatens you? N   Is it safe for you to go home? Y       Coordination of Care Questionnaire:  :     1. Have you been to the ER, urgent care clinic since your last visit? Hospitalized since your last visit? no    2.  Have you seen or consulted any other health care providers outside of the [address] [place] [address] since your last visit? Include any pap smears or colon screening. Mammograms - 7/16/20, 9/9/20 and 3/8/21. US of right breast - 9/9/20. Dr Danisha Pedersen - 10/2/20 and 4/9/21.

## 2021-07-16 ENCOUNTER — APPOINTMENT (OUTPATIENT)
Dept: INTERNAL MEDICINE CLINIC | Age: 51
End: 2021-07-16

## 2021-07-16 DIAGNOSIS — E55.9 VITAMIN D DEFICIENCY: ICD-10-CM

## 2021-07-16 DIAGNOSIS — I10 ESSENTIAL HYPERTENSION: ICD-10-CM

## 2021-07-16 DIAGNOSIS — E78.2 MIXED HYPERLIPIDEMIA: ICD-10-CM

## 2021-07-16 DIAGNOSIS — E11.9 CONTROLLED TYPE 2 DIABETES MELLITUS WITHOUT COMPLICATION, WITHOUT LONG-TERM CURRENT USE OF INSULIN (HCC): ICD-10-CM

## 2021-07-16 DIAGNOSIS — Z11.59 NEED FOR HEPATITIS C SCREENING TEST: ICD-10-CM

## 2021-07-18 DIAGNOSIS — E55.9 VITAMIN D DEFICIENCY: ICD-10-CM

## 2021-07-18 DIAGNOSIS — E11.65 UNCONTROLLED TYPE 2 DIABETES MELLITUS WITH HYPERGLYCEMIA (HCC): Primary | ICD-10-CM

## 2021-07-18 DIAGNOSIS — E78.2 MIXED HYPERLIPIDEMIA: ICD-10-CM

## 2021-07-18 DIAGNOSIS — E11.9 CONTROLLED TYPE 2 DIABETES MELLITUS WITHOUT COMPLICATION, WITHOUT LONG-TERM CURRENT USE OF INSULIN (HCC): ICD-10-CM

## 2021-07-18 DIAGNOSIS — I10 ESSENTIAL HYPERTENSION: ICD-10-CM

## 2021-07-18 PROBLEM — E66.01 OBESITY, MORBID (HCC): Status: RESOLVED | Noted: 2018-04-23 | Resolved: 2021-07-18

## 2021-07-18 PROBLEM — R55 SYNCOPE AND COLLAPSE: Status: RESOLVED | Noted: 2018-05-21 | Resolved: 2021-07-18

## 2021-07-18 PROBLEM — R41.82 ALTERED MENTAL STATUS, UNSPECIFIED: Status: RESOLVED | Noted: 2018-03-02 | Resolved: 2021-07-18

## 2021-07-18 PROBLEM — R92.8 BREAST LESION ON MAMMOGRAPHY: Status: ACTIVE | Noted: 2021-07-18

## 2021-07-18 PROBLEM — F44.5 PSEUDOSEIZURE: Status: ACTIVE | Noted: 2018-03-02

## 2021-07-18 LAB
25(OH)D3+25(OH)D2 SERPL-MCNC: 15.6 NG/ML (ref 30–100)
ALBUMIN SERPL-MCNC: 3.9 G/DL (ref 3.8–4.9)
ALBUMIN/CREAT UR: 12 MG/G CREAT (ref 0–29)
ALBUMIN/GLOB SERPL: 1.3 {RATIO} (ref 1.2–2.2)
ALP SERPL-CCNC: 130 IU/L (ref 48–121)
ALT SERPL-CCNC: 33 IU/L (ref 0–32)
AST SERPL-CCNC: 21 IU/L (ref 0–40)
BASOPHILS # BLD AUTO: 0 X10E3/UL (ref 0–0.2)
BASOPHILS NFR BLD AUTO: 0 %
BILIRUB SERPL-MCNC: 0.5 MG/DL (ref 0–1.2)
BUN SERPL-MCNC: 15 MG/DL (ref 6–24)
BUN/CREAT SERPL: 20 (ref 9–23)
CALCIUM SERPL-MCNC: 9.1 MG/DL (ref 8.7–10.2)
CHLORIDE SERPL-SCNC: 99 MMOL/L (ref 96–106)
CHOLEST SERPL-MCNC: 207 MG/DL (ref 100–199)
CO2 SERPL-SCNC: 25 MMOL/L (ref 20–29)
CREAT SERPL-MCNC: 0.76 MG/DL (ref 0.57–1)
CREAT UR-MCNC: 67.8 MG/DL
EOSINOPHIL # BLD AUTO: 0.1 X10E3/UL (ref 0–0.4)
EOSINOPHIL NFR BLD AUTO: 2 %
ERYTHROCYTE [DISTWIDTH] IN BLOOD BY AUTOMATED COUNT: 13.1 % (ref 11.7–15.4)
EST. AVERAGE GLUCOSE BLD GHB EST-MCNC: 303 MG/DL
GLOBULIN SER CALC-MCNC: 3.1 G/DL (ref 1.5–4.5)
GLUCOSE SERPL-MCNC: 357 MG/DL (ref 65–99)
HBA1C MFR BLD: 12.2 % (ref 4.8–5.6)
HCT VFR BLD AUTO: 41.3 % (ref 34–46.6)
HCV AB S/CO SERPL IA: <0.1 S/CO RATIO (ref 0–0.9)
HDLC SERPL-MCNC: 38 MG/DL
HGB BLD-MCNC: 13.2 G/DL (ref 11.1–15.9)
IMM GRANULOCYTES # BLD AUTO: 0 X10E3/UL (ref 0–0.1)
IMM GRANULOCYTES NFR BLD AUTO: 0 %
LDLC SERPL CALC-MCNC: 148 MG/DL (ref 0–99)
LYMPHOCYTES # BLD AUTO: 1.6 X10E3/UL (ref 0.7–3.1)
LYMPHOCYTES NFR BLD AUTO: 21 %
MCH RBC QN AUTO: 26.1 PG (ref 26.6–33)
MCHC RBC AUTO-ENTMCNC: 32 G/DL (ref 31.5–35.7)
MCV RBC AUTO: 82 FL (ref 79–97)
MICROALBUMIN UR-MCNC: 8 UG/ML
MONOCYTES # BLD AUTO: 0.5 X10E3/UL (ref 0.1–0.9)
MONOCYTES NFR BLD AUTO: 7 %
NEUTROPHILS # BLD AUTO: 5.3 X10E3/UL (ref 1.4–7)
NEUTROPHILS NFR BLD AUTO: 70 %
PLATELET # BLD AUTO: 324 X10E3/UL (ref 150–450)
POTASSIUM SERPL-SCNC: 4.1 MMOL/L (ref 3.5–5.2)
PROT SERPL-MCNC: 7 G/DL (ref 6–8.5)
RBC # BLD AUTO: 5.06 X10E6/UL (ref 3.77–5.28)
SODIUM SERPL-SCNC: 136 MMOL/L (ref 134–144)
TRIGL SERPL-MCNC: 114 MG/DL (ref 0–149)
TSH SERPL DL<=0.005 MIU/L-ACNC: 2.01 UIU/ML (ref 0.45–4.5)
VLDLC SERPL CALC-MCNC: 21 MG/DL (ref 5–40)
WBC # BLD AUTO: 7.6 X10E3/UL (ref 3.4–10.8)

## 2021-07-18 RX ORDER — ATORVASTATIN CALCIUM 40 MG/1
40 TABLET, FILM COATED ORAL DAILY
Qty: 90 TABLET | Refills: 1 | Status: SHIPPED | OUTPATIENT
Start: 2021-07-18 | End: 2022-01-26

## 2021-07-18 RX ORDER — OLMESARTAN MEDOXOMIL AND HYDROCHLOROTHIAZIDE 20/12.5 20; 12.5 MG/1; MG/1
1 TABLET ORAL DAILY
Qty: 90 TABLET | Refills: 1 | Status: SHIPPED | OUTPATIENT
Start: 2021-07-18 | End: 2022-01-26

## 2021-07-18 RX ORDER — ERGOCALCIFEROL 1.25 MG/1
50000 CAPSULE ORAL
Qty: 13 CAPSULE | Refills: 0 | Status: SHIPPED | OUTPATIENT
Start: 2021-07-18 | End: 2021-10-16

## 2021-07-18 RX ORDER — GLIMEPIRIDE 2 MG/1
2 TABLET ORAL
Qty: 90 TABLET | Refills: 0 | Status: SHIPPED | OUTPATIENT
Start: 2021-07-18 | End: 2021-08-16 | Stop reason: SDUPTHER

## 2021-07-18 RX ORDER — METFORMIN HYDROCHLORIDE 500 MG/1
1000 TABLET, EXTENDED RELEASE ORAL 2 TIMES DAILY WITH MEALS
Qty: 360 TABLET | Refills: 1 | Status: SHIPPED | OUTPATIENT
Start: 2021-07-18 | End: 2022-01-31 | Stop reason: SDUPTHER

## 2021-07-18 NOTE — PROGRESS NOTES
Please call with lab results -   -The blood sugar is extremely high at 357 consistent with uncontrolled diabetes, A1C 12.2%  -resume the metformin as we discussed at the visit  -I also would like to add a 2nd medicine to help bring down the sugar, glimeperide 2 mg, take once daily with breakfast or the first main meal of the day, it must be taken with food since it works to lower the blood sugar  -I have also placed referral to DEP, they will call her to schedule, please proceed with this program so they can help with lifestyle changes and diabetes diet  -cholesterol is elevated, , goal 70, resume atorvastatin 40 mg as discussed at visit, follow low fat diet  -Your vitamin D is low. I will prescribe you weekly vitamin D called ergocalciferol 50,000 units; take this once weekly for 3 months. After you have completed the prescription for 3 months, then take Vitamin D3 2000 units once daily obtained over-the-counter and stay on this as a long-term daily supplement. We should recheck you levels in 6 months.    -we will review further at next visit in 1 month

## 2021-07-20 ENCOUNTER — TELEPHONE (OUTPATIENT)
Dept: INTERNAL MEDICINE CLINIC | Age: 51
End: 2021-07-20

## 2021-07-20 NOTE — TELEPHONE ENCOUNTER
Received referral from Dr. Nimco Vergara office to schedule patient for a new patient appointment with [facility] for diabetes education. Contacted patient to schedule, patient declined and stated that she had already been scheduled with a different provider for an appointment on 7/29/21.

## 2021-07-21 ENCOUNTER — OFFICE VISIT (OUTPATIENT)
Dept: CARDIOLOGY CLINIC | Age: 51
End: 2021-07-21
Payer: COMMERCIAL

## 2021-07-21 VITALS
WEIGHT: 277 LBS | HEIGHT: 67 IN | SYSTOLIC BLOOD PRESSURE: 120 MMHG | HEART RATE: 92 BPM | DIASTOLIC BLOOD PRESSURE: 80 MMHG | RESPIRATION RATE: 16 BRPM | OXYGEN SATURATION: 97 % | BODY MASS INDEX: 43.47 KG/M2

## 2021-07-21 DIAGNOSIS — E11.9 CONTROLLED TYPE 2 DIABETES MELLITUS WITHOUT COMPLICATION, WITHOUT LONG-TERM CURRENT USE OF INSULIN (HCC): Primary | ICD-10-CM

## 2021-07-21 PROCEDURE — 99204 OFFICE O/P NEW MOD 45 MIN: CPT | Performed by: SPECIALIST

## 2021-07-21 PROCEDURE — 93000 ELECTROCARDIOGRAM COMPLETE: CPT | Performed by: SPECIALIST

## 2021-07-21 RX ORDER — METOPROLOL SUCCINATE 50 MG/1
50 TABLET, EXTENDED RELEASE ORAL
Qty: 90 TABLET | Refills: 2 | Status: SHIPPED | OUTPATIENT
Start: 2021-07-21 | End: 2022-04-22

## 2021-07-21 NOTE — PATIENT INSTRUCTIONS
Please STOP taking Lopressor (Metoprolol Tartrate). Please START taking TOPROL XL 50mg nightly.     Follow up with Dr. Mikey Hunter in 1 year

## 2021-07-21 NOTE — PROGRESS NOTES
[address] [facility]                                                            OFFICE NOTE        Victor Hugo Wooten M.D.,MJ Hope BRINK   1970  [id number]    Date/Time:  7/21/20218:34 AM            SUBJECTIVE:  NO CP OR SOB OR PALPITATIONS   NO RECURRENT SYNCOPE OVERALL DOING OK       Assessment/Plan    1. Syncope: She has any recurrent syncope does show seizures. To be noted that she had a normal tilt table test and normal cardiac work-up and neurological work-up in the past.    Likely intense stress from work was the cause of her symptoms. Her electrocardiogram today reveals significant baseline artifact but overall a normal sinus rhythm with no significant ST-T changes and minimal intraventricular conduction delay. She has run out metoprolol but a month ago. She has had no issues thus far. To be noted that metoprolol was started after the tilt table test which all be negative show a significant elevation in blood pressure. 2.  Hypertension: Her blood pressure today seems to be reasonable. The heart rate at the upper limits of normal.  Discussed use of metoprolol. I will change metoprolol from metoprolol tartrate to metoprolol succinate and she will start Toprol-XL 50 mg nightly. She will keep an eye on her blood pressure and let me know if anything changes. We discussed exercise and weight loss. Her primary care physician is closely follow her cholesterol. Otherwise I will see her back in a year          HPI   45 y.o. female. 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 Ejection fraction was estimated to be 60 %. There were no regional  wall motion abnormalities. There was mild to moderate concentric  hypertrophy.     Mitral valve: No echocardiographic evidence for prolapse. Negative HUT on 5/18 but high bp correlating with symptoms bblockers started  EEG negative in 2018              CARDIAC STUDIES                                    EKG Results     Procedure 720 Value Units Date/Time    AMB POC EKG ROUTINE W/ 12 LEADS, INTER & REP [[id number]] Resulted: 07/21/21 0828    Order Status: Completed Updated: 07/21/21 0833              IMAGING      MRI Results (most recent):  Results from Abstract encounter on 05/29/18    MRI BRAIN WO CONT      CT Results (most recent):  Results from [place] encounter on 12/22/11    CT SINUSES WITHOUT CONTRAST    Narrative  **Final Report**      ICD Codes / Adm. Diagnosis: 473.0   / CHRONIC MAXILLARY SINUSITIS  Examination:  CT SINUSES WO CON  - [id number] - Dec 22 2011  3:32PM  Accession No:  [id number]  Reason:  Chronic bilateral maxillary sinusitis      REPORT:  EXAM:  CT SINUS WITHOUT CONTRAST    INDICATION: Sinusitis. COMPARISON: None. CONTRAST: None. TECHNIQUE:  Multislice helical CT of the paranasal sinuses was performed in the axial  plane and sagittal and coronal reformations were generated. FINDINGS:  The frontal sinuses and frontoethmoidal recesses are clear. The patient is status post bilateral anterior ethmoidectomy. The patient is status post bilateral maxillary antrectomy. The maxillary  sinuses are clear. The posterior ethmoid air cells and sphenoid sinus are clear. The nasal cavity is clear. The nasal septum is midline. There is no bone destruction or bone dehiscence of the paranasal sinuses. IMPRESSION: Status post bilateral anterior ethmoidectomy and maxillary  antrectomy (FESS). Clear paranasal sinuses. Signing/Reading Doctor: Elena Alcala ([id number])  Approved: Elena Alcala ([id number])  12/23/2011      XR Results (most recent):  No results found for this or any previous visit.           Past Medical History:   Diagnosis Date    Altered mental status, unspecified 3/2/2018    Breast lesion on mammography 07/18/2021    right breast, benign node unchanged on 6 month follow up    Diabetes ([facility] [address]     Heart murmur     Hypertension     Ill-defined condition     pseudo seizure    Syncope and collapse 5/21/2018     Past Surgical History:   Procedure Laterality Date    HX GYN      \"cyst removal\"    HX SEPTOPLASTY      HX TONSIL AND ADENOIDECTOMY      TILT TABLE EVAL W/WO DRUG  5/21/2018          Social History     Tobacco Use    Smoking status: Never Smoker    Smokeless tobacco: Never Used   Substance Use Topics    Alcohol use: No    Drug use: No     No family history on file. Allergies   Allergen Reactions    Decadron [Dexamethasone] Other (comments)     Joint swelling, rash         Visit Vitals  /80   Pulse 92   Resp 16   Ht 5' 7\" (1.702 m)   Wt 277 lb (125.6 kg)   SpO2 97%   BMI 43.38 kg/m²         Last 3 Recorded Weights in this Encounter    07/21/21 0823   Weight: 277 lb (125.6 kg)            Review of Systems:   Pertinent items are noted in the History of Present Illness. Visit Vitals  /80   Pulse 92   Resp 16   Ht 5' 7\" (1.702 m)   Wt 277 lb (125.6 kg)   SpO2 97%   BMI 43.38 kg/m²     General Appearance:  Well developed, well nourished,alert and oriented x 3, and individual in no acute distress. Ears/Nose/Mouth/Throat:   Hearing grossly normal.         Neck: Supple. Chest:   Lungs clear to auscultation bilaterally. Cardiovascular:  Regular rate and rhythm, S1, S2 normal, no murmur. Abdomen:   Soft, non-tender, bowel sounds are active. Extremities: No edema bilaterally. Skin: Warm and dry. Current Outpatient Medications on File Prior to Visit   Medication Sig Dispense Refill    ergocalciferol (ERGOCALCIFEROL) 1,250 mcg (50,000 unit) capsule Take 1 Capsule by mouth every seven (7) days for 90 days. 13 Capsule 0    glimepiride (AMARYL) 2 mg tablet Take 1 Tablet by mouth every morning.  With food/breakfast 90 Tablet 0    atorvastatin (LIPITOR) 40 mg tablet Take 1 Tablet by mouth daily. 90 Tablet 1    olmesartan-hydroCHLOROthiazide (BENICAR HCT) 20-12.5 mg per tablet Take 1 Tablet by mouth daily. 90 Tablet 1    metFORMIN ER (GLUCOPHAGE XR) 500 mg tablet Take 2 Tablets by mouth two (2) times daily (with meals). Slowly increase up to 2 pills twice a day with meals as discussed 360 Tablet 1    metoprolol tartrate (LOPRESSOR) 50 mg tablet Take 1 Tab by mouth two (2) times a day. Please schedule follow up for further refills. 180 Tab 0    levonorgestreL (MIRENA) 20 mcg/24 hours (5 yrs) 52 mg IUD 1 Device by IntraUTERine route once. No current facility-administered medications on file prior to visit. Sindy Mcdonough had no medications administered during this visit. Current Outpatient Medications   Medication Sig    ergocalciferol (ERGOCALCIFEROL) 1,250 mcg (50,000 unit) capsule Take 1 Capsule by mouth every seven (7) days for 90 days.  glimepiride (AMARYL) 2 mg tablet Take 1 Tablet by mouth every morning. With food/breakfast    atorvastatin (LIPITOR) 40 mg tablet Take 1 Tablet by mouth daily.  olmesartan-hydroCHLOROthiazide (BENICAR HCT) 20-12.5 mg per tablet Take 1 Tablet by mouth daily.  metFORMIN ER (GLUCOPHAGE XR) 500 mg tablet Take 2 Tablets by mouth two (2) times daily (with meals). Slowly increase up to 2 pills twice a day with meals as discussed    metoprolol tartrate (LOPRESSOR) 50 mg tablet Take 1 Tab by mouth two (2) times a day. Please schedule follow up for further refills.  levonorgestreL (MIRENA) 20 mcg/24 hours (5 yrs) 52 mg IUD 1 Device by IntraUTERine route once. No current facility-administered medications for this visit.          Lab Results   Component Value Date/Time    Cholesterol, total 207 (H) 07/16/2021 12:00 AM    HDL Cholesterol 38 (L) 07/16/2021 12:00 AM    LDL, calculated 148 (H) 07/16/2021 12:00 AM    LDL, calculated 83 01/27/2020 11:34 AM    VLDL, calculated 21 07/16/2021 12:00 AM    VLDL, calculated 15 01/27/2020 11:34 AM    Triglyceride 114 07/16/2021 12:00 AM       Lab Results   Component Value Date/Time    Sodium 136 07/16/2021 12:00 AM    Potassium 4.1 07/16/2021 12:00 AM    Chloride 99 07/16/2021 12:00 AM    CO2 25 07/16/2021 12:00 AM    Anion gap 6 02/23/2018 01:00 PM    Glucose 357 (H) 07/16/2021 12:00 AM    BUN 15 07/16/2021 12:00 AM    Creatinine 0.76 07/16/2021 12:00 AM    BUN/Creatinine ratio 20 07/16/2021 12:00 AM    GFR est  07/16/2021 12:00 AM    GFR est non-AA 91 07/16/2021 12:00 AM    Calcium 9.1 07/16/2021 12:00 AM       Lab Results   Component Value Date/Time    ALT (SGPT) 33 (H) 07/16/2021 12:00 AM    Alk. phosphatase 130 (H) 07/16/2021 12:00 AM    Bilirubin, total 0.5 07/16/2021 12:00 AM       Lab Results   Component Value Date/Time    WBC 7.6 07/16/2021 12:00 AM    Hemoglobin, POC 12.9 03/15/2019 10:03 AM    HGB 13.2 07/16/2021 12:00 AM    Hematocrit, POC 38 03/15/2019 10:03 AM    HCT 41.3 07/16/2021 12:00 AM    PLATELET 523 45/42/2331 12:00 AM    MCV 82 07/16/2021 12:00 AM       Lab Results   Component Value Date/Time    TSH 2.010 07/16/2021 12:00 AM         Lab Results   Component Value Date/Time    Cholesterol, total 207 (H) 07/16/2021 12:00 AM    Cholesterol, total 141 01/27/2020 11:34 AM    HDL Cholesterol 38 (L) 07/16/2021 12:00 AM    HDL Cholesterol 43 01/27/2020 11:34 AM    LDL, calculated 148 (H) 07/16/2021 12:00 AM    LDL, calculated 83 01/27/2020 11:34 AM    Triglyceride 114 07/16/2021 12:00 AM    Triglyceride 76 01/27/2020 11:34 AM                Please note that this dictation was completed with [employer] the computer voice recognition software. Quite often unanticipated grammatical, syntax, homophones, and other interpretative errors are inadvertently transcribed by the computer software. Please disregard these errors. Please excuse any errors that have escaped final proofreading.

## 2021-07-23 ENCOUNTER — TELEPHONE (OUTPATIENT)
Dept: INTERNAL MEDICINE CLINIC | Age: 51
End: 2021-07-23

## 2021-07-23 NOTE — PROGRESS NOTES
Spoke to patient and provided them with the results. Patient stated they understood the information and has no questions at this time.

## 2021-07-23 NOTE — TELEPHONE ENCOUNTER
Ms. Frances Barbra returned Dunia's call about lab results. Please call and advise.     Thank you,  Yoav Little

## 2021-07-29 ENCOUNTER — CLINICAL SUPPORT (OUTPATIENT)
Dept: DIABETES SERVICES | Age: 51
End: 2021-07-29
Payer: COMMERCIAL

## 2021-07-29 DIAGNOSIS — E11.9 CONTROLLED TYPE 2 DIABETES MELLITUS WITHOUT COMPLICATION, WITHOUT LONG-TERM CURRENT USE OF INSULIN (HCC): Primary | ICD-10-CM

## 2021-07-29 PROCEDURE — G0108 DIAB MANAGE TRN  PER INDIV: HCPCS

## 2021-07-30 NOTE — PROGRESS NOTES
[facility]  Diabetes Self-Management Education & Support Program  Pre-program Assessment    Reason for Referral: DSMES New Diagnosis  Referral Source: Kely Rockwell  Services requested: DSMES    ASSESSMENT    From my perspective, the participant would benefit from [facility] specifically related to Reducing risks, Healthy eating, Monitoring, Physical activity, Taking medications, Healthy coping and Problem solving. Will adapt DSMES program to build on participant's skills score, confidence score and strengths in preparedness score as noted in the Diabetes Skills, Confidence, and Preparedness Index. During the program, we will focus on providing DSMES that specifically addresses participant's interest in Reducing risks, Healthy eating, Monitoring, Physical activity, Taking medications, Healthy coping and Problem solving, as shown by their reported readiness to change. The participant would be best served by attending weekly group class series. Diabetes Self-Management Education Follow-up Visit: 8-4-2021       Clinical Presentation  Inder Cade is a 45 y.o.  female referred for diabetes self-management education. Participant has Type 2 DM not on insulin for <1 year. Family history positive for diabetes. Patient reports not receiving DSMES services in the past. Most recent A1c value:   Lab Results   Component Value Date/Time    Hemoglobin A1c 12.2 (H) 07/16/2021 12:00 AM       Diabetes-related medical history: none reported  Diabetes-related medications:  Current dosing:   Key Antihyperglycemic Medications             glimepiride (AMARYL) 2 mg tablet Take 1 Tablet by mouth every morning. With food/breakfast    metFORMIN ER (GLUCOPHAGE XR) 500 mg tablet Take 2 Tablets by mouth two (2) times daily (with meals). Slowly increase up to 2 pills twice a day with meals as discussed      Ms.  Elda Tyler reported currently taking Metformin 500 mg 1 tab in the evening with dining    Blood Pressure Management  Key ACE/ARB Medications             olmesartan-hydroCHLOROthiazide (BENICAR HCT) 20-12.5 mg per tablet Take 1 Tablet by mouth daily. Lipid Management  Key Antihyperlipidemia Meds             atorvastatin (LIPITOR) 40 mg tablet Take 1 Tablet by mouth daily. Clot Prevention  Key Anti-Platelet Anticoagulant Meds     The patient is on no antiplatelet meds or anticoagulants. Learning Assessment  Learning objectives Educator assessment (7/30/2021)   Diabetes Disease Process  The participant can   A) describe diabetes in basic terms;   B) state the type of diabetes they have; &   C) state accepted blood glucose targets. Healthy Eating  The participant can   A) identify carbohydrate foods; &   B) accurately read food labels. Being Active  The participant can  A) state the benefits of physical activity;  B) report their current PA practices;  C) identify PA they would consider incorporating in their lives; &  D) develop an implementation plan. Monitoring  The participant can  A) operate their blood glucose meter; &  B) describe how they log their blood glucoses to share with their provider. Taking Medications  The participant can  A) name their diabetes medications;  B) state the purpose and dose;  C) note side effects; &  D) describe proper storage, disposal & transport (if appropriate). Healthy Coping  The participant can    A) describe their response to diabetes diagnosis; B) describe their specific coping mechanisms;  C) identify supportive people and/or other resources that positively support their diabetes self-care and health. Reducing Risks  The participant can describe the preventive measures used by providers to promote health and prevent diabetes complications.      Problem Solving  The participant can   A) identify signs, symptoms & treatment of hypoglycemia;   B) identify signs, symptoms & treatment of hyperglycemia;  C) describe their sick day plan; &  D) identify BG patterns to discuss with their provider. Yes  Yes  No        Yes  No        No  No  No  No        No  No          Yes  Yes  Yes  Yes      Yes  Yes  Yes        No          No  No  No  No     Characteristics to Learning   Barriers to Learning   [] Cognitive loss  [] Mental retardation   [] Intellectual delay/cognitive impairment  [] Psychiatric disorder  [] Visually impaired  [] Hearing loss                 [] Low literacy (difficulty with written text)  [] Low numeracy (difficulty with mathematical information  [] Low health literacy (difficulty with understanding health information & services  [] Language  [] Functional limitation  [] Pain   [] Financial  [] Transportation  [x] None   Favorite Ways to Learn   [] Lecture  [] Slides  [x] Reading [] Video-Internet  [] Cassettes/CDs/MP3's  [] Interactive Small Groups [] Other       Behavioral Assessment  Current self-care practices  Educator assessment (7/30/2021)   Healthy Eating  Current practices  24-hour Dietary Recall:  Breakfast: none  Lunch: [employer]  Dinner: [employer]: whopper with cheese, small sandy  Snacks: none  Beverages: water  Alcohol: none     Would benefit from DSMES related to Healthy Eating: Yes      Eats a carbohydrate controlled diet: No      Stage of change: Action   Being Active  Current practices  How many days during the past week have you performed physical activity where your heart beats faster and your breathing is harder than normal for 30 minutes or more?  0 days    How many days in a typical week do you perform activity such as this?  0 days     Would benefit from [facility] related to Being Active: Yes      Exercises 150 minutes/week: No      Stage of change: Action    Ms. Winnie Quinn reported that she is \"not really physically active\". Monitoring  Current practices  Do you monitor your blood sugar? No    How often do you monitor? Never      Do you know your last A1c measurement? Yes    Do you know the meaning of the A1c? Yes     Would benefit from [facility] related to Monitoring: Yes      Uses BG readings to establish trends and understand BG patterns: No      Stage of change: Action     Ms. Chioma Knott reported that she does not have a meter and testing supplies. Encouraged her to communicate with insurance and provider to obtain BG meter and testing supplies. Taking Medication  Current practices  Do you understand what your diabetes medications do? Yes    How often do you miss doses of your diabetes medications? Never    Can you afford your diabetes medications? Yes   Would benefit from [facility] related to Taking Medication: Yes      Takes medications consistently to receive full benefit: Yes      Stage of change: Action       Healthy Coping   Current state  Diabetes Skills, Confidence and Preparedness Index: Total score: 3.4  Skills: 2.2  Confidence: 2.6  Preparedness: 6.0   Would benefit from DSMES related to Healthy Coping: Yes      Identifies specific people, organizations,etc, that actively support their diabetes self-care efforts: Yes      Stage of change: Action     Reducing Risks  Current state  Vaccines:  Influenza:   Immunization History   Administered Date(s) Administered    Influenza Vaccine 11/13/2019       Pneumococcal: There is no immunization history for the selected administration types on file for this patient. Hepatitis: There is no immunization history for the selected administration types on file for this patient.     Examinations:  Diabetic Foot and Eye Exam HM Status   Topic Date Due    Diabetic Foot Care  Never done    Eye Exam  Never done        Dental exam: Last appointment was: 7-    Foot exam: Last appointment was: 7-    Heart Protection:  BP Readings from Last 2 Encounters:   07/21/21 120/80   07/15/21 130/84        Lab Results   Component Value Date/Time    LDL, calculated 148 (H) 07/16/2021 12:00 AM    LDL, calculated 83 01/27/2020 11:34 AM Kidney Protection:  Lab Results   Component Value Date/Time    Microalb/Creat ratio (ug/mg creat.) 12 07/16/2021 12:00 AM        Would benefit from [facility] related to Reducing Risks: Yes      Actively participates in decision-making with provider regarding secondary prevention:  Yes      Stage of change: Action       Ms. Justino Falcon reported last eye exam: 7-, Influenza vaccine: 10-, Hepatitis B series: 3/19/1996, 4/23/1996, 10/31/2007, COVID vaccine: Johnson Favio #1 2/20/2021 #2 3/13/2021. Problem Solving  Current state  Hypoglycemia Management:  What are signs and symptoms of hypoglycemia that you experience? Pt reported being unaware of s/s of hypoglycemia    How do you prevent hypoglycemia? Pt reported being unaware of how to prevent hypoglycemia    How do you treat hypoglycemia? Pt reported being unaware of how to treat hypoglycemia    Hyperglycemia Management:  What are signs and symptoms of hyperglycemia that you experience? Pt reported being unaware of s/s of hyperglycemia    How can you prevent hyperglycemia? Pt reported being unaware of how to prevent hyperglycemia    Sick Day Management:  What do you do differently on sick days? Pt reported being unaware of self-management on sick days    Pattern Management:  Do you notice blood glucose patterns when you look at the readings in your meter or logbook? No    How do you use the blood glucose readings from your meter or logbook?  Pt reported being unaware of pattern management skills     Would benefit from [facility] related to Problem Solving: Yes      Articulates appropriate strategies to address hypoglycemia, hyperglycemia, sick day care and BG pattern: No      Stage of change: Action     Note: Content derived from the American Association of Diabetes Educators' Diabetes Education Curriculum: A Guide to Successful Self-Management (3rd edition)      Anaya Hendrix RN on 7/30/2021 at 8:38 AM    Encounter date: 7/29/2021  Time in appointment: 36 minutes  Diabetes Skills, Confidence & Preparedness Index (SCPI) ©  Thank you for completing the Skills, Confidence & Preparedness Index! Below are your scores. All scales and questions are out of 7. If you would like these results emailed, please enter your email address along with some identifying patient information. Email:    Patient Identifier:   Overall SCPI score: 3.4 Skills Score: 2.2  Low: Healthy Eating(Q1),Taking Medication(Q2),Blood Sugar Monitoring(Q4),Reducing Risks(Q5),Problem Solving(Q6),Healthy Coping(Q7),Blood Sugar Monitoring(Q8),Reducing Risks(Q9) Confidence Score: 2.6  Low: Healthy Eating(Q1),Reducing Risks(Q3),Healthy Eating(Q4),Being Active(Q5),Blood Sugar Monitoring(Q6),Problem QZEFIFF(J0) Preparedness Score: 6.0  Low: Healthy Eating(Q1),Being Active(Q2),Healthy Coping(Q3),Reducing Risks(Q4),Blood Sugar Monitoring(Q6),Problem Solving(Q7)  Healthy Eating Score: 3.0  Low: Skills(Q1),Confidence(Q1),Confidence(Q4) Taking Medication Score: 2.0  Low: Skills(Q2) Blood Sugar Monitoring Score: 3.2  Low: Skills(Q4),Skills(Q8),Confidence(Q6) Reducing Risks Score: 3.0  Low: Skills(Q5),Skills(Q9),Confidence(Q3)  Problem Solving Score: 3.3  Low: Skills(Q6),Confidence(Q7) Healthy Coping Score: 4.7  Low: AYZXYZ(O3) Being Active Score: 4.0  Low: Confidence(Q5)  Skills/Knowledge Questions  1. I know how to plan meals that have the best balance between carbohydrates, proteins and vegetables. 2  2. I know how my diabetes medications (pills, injectables and/or insulin) work in my body. 2  3. I know when to check my blood sugar if I want to see how my body responded to a meal. 4  4. I know when to check my blood sugars to determine if my medication or insulin doses are correct. 2  5. I know what to do to prevent a low blood sugar when I exercise (either before, during, or after). 2  6. When I am sick, I know what to do differently with my diabetes management. 2  7.  I know how stress can affect my diabetes management. 2  8. When I look at my blood sugars over a given week, I can explain what my blood sugar pattern is. 2  9. I know what my target levels are for A1c, blood pressure and cholesterol. 2  Confidence Questions  1. I am confident that I can plan balanced meals and snacks. 2  2. I am confident that I can manage my stress. 6  3. I am confident that I can prevent a low blood sugar during or after exercise. 2  4. I am confident that the next time I eat out, I will be able to choose foods that best keep my blood sugars in target. 2  5. I am confident I can include exercise into my schedule. 2  6. I am confident that I can use my daily blood sugars to adjust my diet, my activity, and/or my insulin. 2  7. When something out of my normal routine happens, I am confident that I can problem-solve and keep my diabetes on track. 2  Preparedness Questions  1. Within the next month, I will begin to eat more balanced meals and snacks. 6  2. Within the next month, I will choose an exercise activity and I will start fitting it into my schedule. 6  3. Within the next month, I will make a list of stress management options that work for me. 6  4. Within the next month, I will consistently plan ahead to prevent low blood sugars. 6  5. Within the next month, I will start adjusting my insulin doses on my own. 0  6. Within the next month, I will begin making changes to my diabetes management based on my daily blood sugars (eg - eating, activity and/or insulin). 6  7. Within the next month, I will begin making changes to my diabetes management to meet my overall goals (eg - eating, activity and/or insulin).  6

## 2021-08-04 ENCOUNTER — CLINICAL SUPPORT (OUTPATIENT)
Dept: DIABETES SERVICES | Age: 51
End: 2021-08-04
Payer: COMMERCIAL

## 2021-08-04 DIAGNOSIS — E11.9 CONTROLLED TYPE 2 DIABETES MELLITUS WITHOUT COMPLICATION, WITHOUT LONG-TERM CURRENT USE OF INSULIN (HCC): Primary | ICD-10-CM

## 2021-08-04 PROCEDURE — G0109 DIAB MANAGE TRN IND/GROUP: HCPCS | Performed by: DIETITIAN, REGISTERED

## 2021-08-05 NOTE — PROGRESS NOTES
[facility]  Diabetes Self-Management Education & Support Program  Encounter Note  Class 1        SUMMARY  Diabetes self-care management training was completed related to Reducing risks. The participant will return in one week to continue DSMES related to Healthy eating and Monitoring. The participant did identify a SMART Goal today- 3 meals per day for 7 days- and will practice knowledge and skills related to reducing risks to improve diabetes self-management. EVALUATION:  Camilla Reynolds actively participated in group class discussions. RECOMMENDATIONS:  Communicate with provider regarding recommendations for immunizations/vaccines. Camilla Reynolds is willing to establish a personal health care record to track their diabetes management. Next provider visit is scheduled for 1 week         DATE DSMES TOPIC EVALUATION     8/4/2021 WHAT IS DIABETES?   a. Role of the normal pancreas in energy balance and blood glucose control   b. The defect seen in diabetes   c. Signs & symptoms of diabetes   d. Diagnosis of diabetes   e. Types of diabetes   f. Blood glucose targets in pregnant & non-pregnant adults       The participant knows   Their type of diabetes Yes   The basic physiologic defect Yes   Blood glucose targets yes     DATE DSMES TOPIC EVALUATION     8/4/2021 HOW DO I STAY HEALTHY?   a. Prevention    Vaccinations    Preconception care (if applicable)  b. Examinations    Eye     Dental   Foot   c. Diabetic complications' prevention    Heart health    Kidney Health    Nerve health    Sleep health      The participant has a personal diabetes care record to keep abreast of diabetes health No    The participant would benefit from: Communicate with provider regarding recommended immunizations/vaccines. Establish a personal health care record to track diabetes management.                  Jose Eduardo Rodriguez RD on 8/5/2021 at 9:46 AM        Time in appointment: 120 minutes

## 2021-08-09 ENCOUNTER — TELEPHONE (OUTPATIENT)
Dept: INTERNAL MEDICINE CLINIC | Age: 51
End: 2021-08-09

## 2021-08-09 DIAGNOSIS — E11.65 UNCONTROLLED TYPE 2 DIABETES MELLITUS WITH HYPERGLYCEMIA (HCC): Primary | ICD-10-CM

## 2021-08-09 NOTE — TELEPHONE ENCOUNTER
[facility] is a division of [employer] and they are calling asking for a prescription for diabetic supplies for Ms. Elva Bowers. The need a prescription for a meter and test strips. Lucinda stated the prescription can be faxed to [phone].

## 2021-08-10 RX ORDER — LANCETS
EACH MISCELLANEOUS
Qty: 100 EACH | Refills: 3 | Status: SHIPPED | OUTPATIENT
Start: 2021-08-10

## 2021-08-10 RX ORDER — INSULIN PUMP SYRINGE, 3 ML
EACH MISCELLANEOUS
Qty: 1 KIT | Refills: 0 | Status: SHIPPED | OUTPATIENT
Start: 2021-08-10

## 2021-08-10 NOTE — TELEPHONE ENCOUNTER
Jen called again inregards to Ms. Kinsey Vaz getting a prescription for Diabetic supplies. Please call and advise.     Thank you,  Gallo Mendoza    Fax Number: [phone]

## 2021-08-11 ENCOUNTER — CLINICAL SUPPORT (OUTPATIENT)
Dept: DIABETES SERVICES | Age: 51
End: 2021-08-11
Payer: COMMERCIAL

## 2021-08-11 DIAGNOSIS — E11.9 CONTROLLED TYPE 2 DIABETES MELLITUS WITHOUT COMPLICATION, WITHOUT LONG-TERM CURRENT USE OF INSULIN (HCC): Primary | ICD-10-CM

## 2021-08-11 PROCEDURE — G0109 DIAB MANAGE TRN IND/GROUP: HCPCS

## 2021-08-13 NOTE — PROGRESS NOTES
[facility]  Diabetes Self-Management Education & Support Program  Encounter note    SUMMARY  Diabetes self-care management training was completed related to Healthy eating and Monitoring. The participant will return on August 18 to continue DSMES related to Physical activity and Taking medications. The participant will practice knowledge and skills related to healthy eating to improve diabetes self-management. EVALUATION:  Ms. Julienne Pastrana actively participated in group class discussions and demonstrations. Reported 24 hour food recall includes: breakfast- Belvita breakfast bar; lunch-turkey and swiss cheese sandwich on whole wheat bread, [employer]vita breakfast bar ; dinner- turkey and cheddar cheese tacos, potato salad, apple; snacks-peanut butter crackers; beverages- diet Coke, water. Discussed ADA guidelines for healthy meals, carb counting, and reading food labels. Demonstrated healthy plate method using food models and paper plates. Ms. Julienne Pastrana reported that she is not currently monitoring BG because she does not have a meter or testing supplies. She is waiting for insurance to approve prescription and will follow up with insurance this week. . Discussed recommended targets for BG and A1c. Demonstrated BG monitoring techniques and CGM technology. Encouraged participant to record and share BG results with provider.        RECOMMENDATIONS:  Ms. Julienne Pastrana is willing to practice building healthy meals following ADA guidelines and will follow up with insurance and provider this week to obtain BG meter and testing supplies. Next provider visit is scheduled for  8-       SMART GOAL(S)  1. Eat 3 meals daily for one week  ACHIEVEMENT OF GOAL(S)  [] 0-24%     [] 25-49%     [] 50-74%     [x] %       DATE DSMES TOPIC EVALUATION     8/13/2021 WHAT CAN I EAT?   a. General principles   b.  Determining a healthy weight   c. Nutritional terms & tools    Healthy Plate method    Carbohydrate Counting    Reading food labels    Free apps   d. Pregnancy recommendations      The participant    Uses Healthy Plate principles in constructing meals Yes   Reads food labels in choosing acceptable foods Yes    The participant would benefit from practice building healthy meals following ADA guidelines. DATE DSMES TOPIC EVALUATION     8/13/2021 HOW CAN BLOOD GLUCOSE MONITORING HELP ME?   a. Value of blood glucose monitoring   b. Realistic expectations   c. Blood glucose monitoring targets   d. Target adjustments   e. Setting a1c & blood glucose targets with provider   f. Meter selection    g. Technique for obtaining blood droplet   h. Blood glucose testing sites   i. Determining best times to test   j. Pregnancy recommendations   k. Data sharing with provider        The participant    Can demonstrate their glucometer procedure Not Currently Monitoring   Logs their BG readings No    The participant would benefit from Obtain BG Meter and Testing Supplies and begin monitoring and recording BG results.      Yohan Camilo RN on 8/13/2021 at 3:56 PM    Encounter date: 8/11/2021  Time in appointment: 140 minutes

## 2021-08-16 ENCOUNTER — OFFICE VISIT (OUTPATIENT)
Dept: INTERNAL MEDICINE CLINIC | Age: 51
End: 2021-08-16
Payer: COMMERCIAL

## 2021-08-16 VITALS
HEART RATE: 84 BPM | WEIGHT: 283.5 LBS | TEMPERATURE: 97.3 F | OXYGEN SATURATION: 99 % | SYSTOLIC BLOOD PRESSURE: 126 MMHG | DIASTOLIC BLOOD PRESSURE: 74 MMHG | HEIGHT: 67 IN | BODY MASS INDEX: 44.49 KG/M2 | RESPIRATION RATE: 16 BRPM

## 2021-08-16 DIAGNOSIS — Z23 NEED FOR SHINGLES VACCINE: ICD-10-CM

## 2021-08-16 DIAGNOSIS — E55.9 VITAMIN D DEFICIENCY: ICD-10-CM

## 2021-08-16 DIAGNOSIS — I10 ESSENTIAL HYPERTENSION: ICD-10-CM

## 2021-08-16 DIAGNOSIS — E78.2 MIXED HYPERLIPIDEMIA: ICD-10-CM

## 2021-08-16 DIAGNOSIS — B35.1 ONYCHOMYCOSIS: ICD-10-CM

## 2021-08-16 DIAGNOSIS — E11.65 UNCONTROLLED TYPE 2 DIABETES MELLITUS WITH HYPERGLYCEMIA (HCC): Primary | ICD-10-CM

## 2021-08-16 DIAGNOSIS — I34.1 MVP (MITRAL VALVE PROLAPSE): ICD-10-CM

## 2021-08-16 DIAGNOSIS — I51.7 LVH (LEFT VENTRICULAR HYPERTROPHY): ICD-10-CM

## 2021-08-16 LAB — HBA1C MFR BLD HPLC: 11.1 % (ref 4.5–5.7)

## 2021-08-16 PROCEDURE — 99214 OFFICE O/P EST MOD 30 MIN: CPT | Performed by: PHYSICIAN ASSISTANT

## 2021-08-16 PROCEDURE — 83036 HEMOGLOBIN GLYCOSYLATED A1C: CPT | Performed by: PHYSICIAN ASSISTANT

## 2021-08-16 RX ORDER — GLIMEPIRIDE 4 MG/1
4 TABLET ORAL
Qty: 90 TABLET | Refills: 0 | Status: SHIPPED | OUTPATIENT
Start: 2021-08-16 | End: 2021-10-18 | Stop reason: SDUPTHER

## 2021-08-16 RX ORDER — ZOSTER VACCINE RECOMBINANT, ADJUVANTED 50 MCG/0.5
0.5 KIT INTRAMUSCULAR ONCE
Qty: 0.5 ML | Refills: 1 | Status: SHIPPED | OUTPATIENT
Start: 2021-08-16 | End: 2021-08-16

## 2021-08-16 NOTE — PROGRESS NOTES
Singh Sanchez is a 45 y.o. female     Chief Complaint   Patient presents with    Diabetes     5 week follow up       Visit Vitals  /74 (BP 1 Location: Left arm, BP Patient Position: Sitting, BP Cuff Size: Adult)   Pulse 84   Temp 97.3 °F (36.3 °C) (Temporal)   Resp 16   Ht 5' 7\" (1.702 m)   Wt 283 lb 8 oz (128.6 kg)   SpO2 99%   BMI 44.40 kg/m²       Health Maintenance Due   Topic Date Due    Foot Exam Q1  Never done    Eye Exam Retinal or Dilated  Never done    DTaP/Tdap/Td series (1 - Tdap) Never done    PAP AKA CERVICAL CYTOLOGY  Never done    Colorectal Cancer Screening Combo  Never done    Shingrix Vaccine Age 50> (1 of 2) Never done       1. Have you been to the ER, urgent care clinic since your last visit? Hospitalized since your last visit? No     2. Have you seen or consulted any other health care providers outside of the [address] [place] [address] since your last visit? Include any pap smears or colon screening.  No

## 2021-08-16 NOTE — PROGRESS NOTES
HPI:  45 y.o.  presents for follow up appointment. No hospital, ER or specialist visits since last primary care visit except as noted below.     Last visit 7/15/21    Diabetes - Recap from 7/15/21 - -last A1C 6.8% 1/27/2020, met with nutrition, on metformin XR 1000 mg BID at the time, advised to return in 3 mos and today is her first visit since then  -she ran out of metformin about 12 months ago and did not refill it  -on ARB, statin  -resume metformin, check labs, refer to DEP once all results are in  F/U 1 month  -foot exam next visit, as well as review eye exam requirements    Most recent A1C 12.2% at last visit 7/15/21  She had A1C 6.8% in 1/2020, was on metformin, but did not follow up again until 7/15/21 having been off of metformin for 1 year, and at last visit  A1C 12.2%  She resumed metformin XR 1000 mg BId  Added glimepiride 2 mg daily  Referred to DEP, she has had 2 classes and has lowered her portion sizes, and cut out chips and soda  -she saw her eye doctor right before last visit with me, had dilated exam, will request report    HTN - on olmesartan/HCTZ 20/12.5 daily; she is also on metoprolol 50 mg BID (started by cardiology for elevated BP, MVP and LVH)  She saw cards Dr Chris Payne on 7/21/21, note reviewed, metoprolol 50 BID changed to metoprolol XL 50 mg once daily; released to yearly follow up    Hyperlipidemia - resumed atorvastatin 40 mg after last visit 7/15/21, she had been off of it for several months,  Goal LDL <70  Lab Results   Component Value Date/Time    Cholesterol, total 207 (H) 07/16/2021 12:00 AM    HDL Cholesterol 38 (L) 07/16/2021 12:00 AM    LDL, calculated 148 (H) 07/16/2021 12:00 AM    LDL, calculated 83 01/27/2020 11:34 AM    VLDL, calculated 21 07/16/2021 12:00 AM    VLDL, calculated 15 01/27/2020 11:34 AM    Triglyceride 114 07/16/2021 12:00 AM       Vit D Deficiency - on ergo 50K started in July after last labs  Lab Results   Component Value Date/Time    VITAMIN D, 25-HYDROXY 15.6 (L) 07/16/2021 12:00 AM           Has colonoscopy scheduled this week  Gyn saw Dr Donnell Esparza and had pap since last visit    Patient Active Problem List    Diagnosis    Vitamin D deficiency    Controlled type 2 diabetes mellitus without complication, without long-term current use of insulin ([address]    Breast lesion on mammography    Mixed hyperlipidemia    Class 3 severe obesity with serious comorbidity and body mass index (BMI) of 40.0 to 44.9 in adult ([address]    Hypertension    Pseudoseizure         Past Medical History:   Diagnosis Date    Altered mental status, unspecified 3/2/2018    Breast lesion on mammography 07/18/2021    right breast, benign node unchanged on 6 month follow up    Diabetes ([address]     Heart murmur     Hypertension     Ill-defined condition     pseudo seizure    Syncope and collapse 5/21/2018       Social History     Tobacco Use    Smoking status: Never Smoker    Smokeless tobacco: Never Used   Vaping Use    Vaping Use: Never used   Substance Use Topics    Alcohol use: No    Drug use: No       Outpatient Medications Marked as Taking for the 8/16/21 encounter (Office Visit) with Orly Moses PA-C   Medication Sig Dispense Refill    metoprolol succinate (TOPROL-XL) 50 mg XL tablet Take 1 Tablet by mouth nightly. 90 Tablet 2    ergocalciferol (ERGOCALCIFEROL) 1,250 mcg (50,000 unit) capsule Take 1 Capsule by mouth every seven (7) days for 90 days. 13 Capsule 0    glimepiride (AMARYL) 2 mg tablet Take 1 Tablet by mouth every morning. With food/breakfast 90 Tablet 0    atorvastatin (LIPITOR) 40 mg tablet Take 1 Tablet by mouth daily. 90 Tablet 1    olmesartan-hydroCHLOROthiazide (BENICAR HCT) 20-12.5 mg per tablet Take 1 Tablet by mouth daily. 90 Tablet 1    metFORMIN ER (GLUCOPHAGE XR) 500 mg tablet Take 2 Tablets by mouth two (2) times daily (with meals).  Slowly increase up to 2 pills twice a day with meals as discussed 360 Tablet 1    levonorgestreL (MIRENA) 20 mcg/24 hours (5 yrs) 52 mg IUD 1 Device by IntraUTERine route once. Allergies   Allergen Reactions    Decadron [Dexamethasone] Other (comments)     Joint swelling, rash       ROS:  ROS negative except as per HPI. PE:  Visit Vitals  /74 (BP 1 Location: Left arm, BP Patient Position: Sitting, BP Cuff Size: Adult)   Pulse 84   Temp 97.3 °F (36.3 °C) (Temporal)   Resp 16   Ht 5' 7\" (1.702 m)   Wt 283 lb 8 oz (128.6 kg)   SpO2 99%   BMI 44.40 kg/m²     Gen: alert, oriented, no acute distress  Head: normocephalic, atraumatic  Ears: external auditory canals clear, TMs without erythema or effusion  Eyes: pupils equal round reactive to light, sclera clear, conjunctiva clear  Oral: moist mucus membranes, no oral lesions, no pharyngeal inflammation or exudate  Neck: symmetric normal sized thyroid, no carotid bruits, no jugular vein distention  Resp: no increase work of breathing, lungs clear to ausculation bilaterally, no wheezing, rales or rhonchi  CV: S1, S2 normal.  No murmurs, rubs, or gallops. Abd: soft, not tender, not distended. No hepatosplenomegaly. Normal bowel sounds. Neuro: cranial nerves intact, normal strength and movement in all extremities, reflexes and sensation intact and symmetric. Skin: no lesion or rash  Extremities: no cyanosis or edema    Results for orders placed or performed in visit on 08/16/21   AMB POC HEMOGLOBIN A1C   Result Value Ref Range    Hemoglobin A1c (POC) 11.1 (A) 4.5 - 5.7 %       Diabetic foot exam:     Left Foot:   Visual Exam: callous - lateral edge; onychomycosis great toe   Pulse DP: 2+ (normal)   Filament test: normal sensation          Right Foot:   Visual Exam: normal , except onychomycosis 2nd toe   Pulse DP: 2+ (normal)   Filament test: normal sensation            Assessment/Plan:    1.  Uncontrolled type 2 diabetes mellitus with hyperglycemia (HCC)  Current A1C 11.1%, improved from 12.2% on 7/15/21  Now on metformin XR 1000 mg Bid and glimepiride 2 mg daily x 1 month with good tolerance  On ARB and statin  Eye exam 7/2021, record requested  Foot exam today with callus and onychomycosis, foot care reviewed, she plans to schedule with a podiatrist she learned about from her DEP class  (-)microalbumin 7/2021    Will increase glimepiride to 4 mg qAM, continue metformin XR 1000 mg BID  Continue DEP, I have previously faxed Rx for glucometer, still waiting for insurance to process the order  F/U 2 mos    - AMB POC HEMOGLOBIN A1C  -  DIABETES FOOT EXAM  - glimepiride (AMARYL) 4 mg tablet; Take 1 Tablet by mouth every morning. With food/breakfast. (THIS IS A NEW AND HIGHER DOSE)  Dispense: 90 Tablet; Refill: 0  - METABOLIC PANEL, COMPREHENSIVE; Future  - HEMOGLOBIN A1C WITH EAG; Future    2. Onychomycosis  Foot exam today with callus and onychomycosis, foot care reviewed, she plans to schedule with a podiatrist she learned about from her DEP class    3. Essential hypertension  126/74  On olmesartan/HCTZ 20/12.5 daily; she is also on metoprolol XL 50 mg daily (started by cardiology for elevated BP, MVP and LVH)  Controlled, continue current regimen    4. Mixed hyperlipidemia  resumed atorvastatin 40 mg after last visit 7/15/21, she had been off of it for several months,  Goal LDL <70  LDL was 148 7/2021  Will recheck lipids 10/2021 after being back on atorvastatin x 3 mos    - METABOLIC PANEL, COMPREHENSIVE; Future  - LIPID PANEL; Future    5. LVH (left ventricular hypertrophy)  On metoprolol, has seen cardiology Dr Carissa Lara    6. MVP (mitral valve prolapse)  On metoprolol, has seen cardiology Dr Carissa Lara    7. Need for shingles vaccine    - varicella-zoster recombinant, PF, (Shingrix, PF,) 50 mcg/0.5 mL susr injection; 0.5 mL by IntraMUSCular route once for 1 dose. Administer second dose in 2-6 months  Dispense: 0.5 mL; Refill: 1    8. Vitamin D deficiency  Last D level 15.6 on 7/2021  on ergo 50K started in July after last labs      Health Maintenance reviewed - updated.   She will have pap and colonoscopy reports sent to me    Orders Placed This Encounter    METABOLIC PANEL, COMPREHENSIVE     Standing Status:   Future     Standing Expiration Date:   2022    HEMOGLOBIN A1C WITH EAG     Standing Status:   Future     Standing Expiration Date:   2022    LIPID PANEL     Standing Status:   Future     Standing Expiration Date:   2022    AMB POC HEMOGLOBIN A1C    HM DIABETES FOOT EXAM    varicella-zoster recombinant, PF, (Shingrix, PF,) 50 mcg/0.5 mL susr injection     Si.5 mL by IntraMUSCular route once for 1 dose. Administer second dose in 2-6 months     Dispense:  0.5 mL     Refill:  1    glimepiride (AMARYL) 4 mg tablet     Sig: Take 1 Tablet by mouth every morning. With food/breakfast. (THIS IS A NEW AND HIGHER DOSE)     Dispense:  90 Tablet     Refill:  0       Medications Discontinued During This Encounter   Medication Reason    glimepiride (AMARYL) 2 mg tablet REORDER       Current Outpatient Medications   Medication Sig Dispense Refill    glimepiride (AMARYL) 4 mg tablet Take 1 Tablet by mouth every morning. With food/breakfast. (THIS IS A NEW AND HIGHER DOSE) 90 Tablet 0    metoprolol succinate (TOPROL-XL) 50 mg XL tablet Take 1 Tablet by mouth nightly. 90 Tablet 2    ergocalciferol (ERGOCALCIFEROL) 1,250 mcg (50,000 unit) capsule Take 1 Capsule by mouth every seven (7) days for 90 days. 13 Capsule 0    atorvastatin (LIPITOR) 40 mg tablet Take 1 Tablet by mouth daily. 90 Tablet 1    olmesartan-hydroCHLOROthiazide (BENICAR HCT) 20-12.5 mg per tablet Take 1 Tablet by mouth daily. 90 Tablet 1    metFORMIN ER (GLUCOPHAGE XR) 500 mg tablet Take 2 Tablets by mouth two (2) times daily (with meals). Slowly increase up to 2 pills twice a day with meals as discussed 360 Tablet 1    levonorgestreL (MIRENA) 20 mcg/24 hours (5 yrs) 52 mg IUD 1 Device by IntraUTERine route once.       glucose blood VI test strips (ASCENSIA AUTODISC VI, ONE TOUCH ULTRA TEST VI) strip Check blood sugar once daily. Please dispense brand preferred by insurance. Dx: E11.65 (Patient not taking: Reported on 8/16/2021) 100 Strip 3    Blood-Glucose Meter monitoring kit Use to monitor blood sugar. Please dispense the brand preferred by insurance. Dx: E11.65 (Patient not taking: Reported on 8/16/2021) 1 Kit 0    lancets misc Use to check blood sugar once daily Dx: E11.65 (Patient not taking: Reported on 8/16/2021) 100 Each 3       Recommended healthy diet low in carbohydrates, fats, sodium and cholesterol. Recommended regular cardiovascular exercise 3-6 times per week for 30-60 minutes daily. Verbal and written instructions (see AVS) provided. Patient expresses understanding of diagnosis and treatment plan. Follow-up and Dispositions    · Return in about 2 months (around 10/16/2021) for DM,cholesterol, 2nd appt for fasting labs 1 week prior to visit. Future Appointments   Date Time Provider Shereen Tam   8/18/2021  3:30 PM DIABETES GROUP CLASS [facility] BS AMB   8/25/2021  3:30 PM DIABETES GROUP CLASS [facility] BS AMB   9/7/2021  7:30 AM [facility] PSYCHIATRIC [place] [facility].  [facility]'S    10/11/2021  8:00 AM LAB ONLY [facility] BS AMB   10/18/2021  4:00 PM Orly Martinez, MANOJ [facility] BS AMB   7/21/2022  8:20 AM Deonte Farrell MD [facility] BS AMB

## 2021-08-16 NOTE — PATIENT INSTRUCTIONS
Diabetes Foot Health: Care Instructions  Your Care Instructions     When you have diabetes, your feet need extra care and attention. Diabetes can damage the nerve endings and blood vessels in your feet, making you less likely to notice when your feet are injured. Diabetes also limits your body's ability to fight infection and get blood to areas that need it. If you get a minor foot injury, it could become an ulcer or a serious infection. With good foot care, you can prevent most of these problems. Caring for your feet can be quick and easy. Most of the care can be done when you are bathing or getting ready for bed. Follow-up care is a key part of your treatment and safety. Be sure to make and go to all appointments, and call your doctor if you are having problems. It's also a good idea to know your test results and keep a list of the medicines you take. How can you care for yourself at home? · Keep your blood sugar close to normal by watching what and how much you eat, monitoring blood sugar, taking medicines if prescribed, and getting regular exercise. · Do not smoke. Smoking affects blood flow and can make foot problems worse. If you need help quitting, talk to your doctor about stop-smoking programs and medicines. These can increase your chances of quitting for good. · Eat a diet that is low in fats. High fat intake can cause fat to build up in your blood vessels and decrease blood flow. · Inspect your feet daily for blisters, cuts, cracks, or sores. If you cannot see well, use a mirror or have someone help you. · Take care of your feet:  ? Wash your feet every day. Use warm (not hot) water. Check the water temperature with your wrists or other part of your body, not your feet. ? Dry your feet well. Pat them dry. Do not rub the skin on your feet too hard. Dry well between your toes. If the skin on your feet stays moist, bacteria or a fungus can grow, which can lead to infection. ?  Keep your skin soft. Use moisturizing skin cream to keep the skin on your feet soft and prevent calluses and cracks. But do not put the cream between your toes, and stop using any cream that causes a rash. ? Clean underneath your toenails carefully. Do not use a sharp object to clean underneath your toenails. Use the blunt end of a nail file or other rounded tool. ? Trim and file your toenails straight across to prevent ingrown toenails. Use a nail clipper, not scissors. Use an emery board to smooth the edges. · Change socks daily. Socks without seams are best, because seams often rub the feet. You can find socks for people with diabetes from specialty catalogs. · Look inside your shoes every day for things like gravel or torn linings, which could cause blisters or sores. · Buy shoes that fit well:  ? Look for shoes that have plenty of space around the toes. This helps prevent bunions and blisters. ? Try on shoes while wearing the kind of socks you will usually wear with the shoes. ? Avoid plastic shoes. They may rub your feet and cause blisters. Good shoes should be made of materials that are flexible and breathable, such as leather or cloth. ? Break in new shoes slowly by wearing them for no more than an hour a day for several days. Take extra time to check your feet for red areas, blisters, or other problems after you wear new shoes. · Do not go barefoot. Do not wear sandals, and do not wear shoes with very thin soles. Thin soles are easy to puncture. They also do not protect your feet from hot pavement or cold weather. · Have your doctor check your feet during each visit. If you have a foot problem, see your doctor. Do not try to treat an early foot problem at home. Home remedies or treatments that you can buy without a prescription (such as corn removers) can be harmful. · Always get early treatment for foot problems. A minor irritation can lead to a major problem if not properly cared for early.   When should you call for help? Call your doctor now or seek immediate medical care if:    · You have a foot sore, an ulcer or break in the skin that is not healing after 4 days, bleeding corns or calluses, or an ingrown toenail.     · You have blue or black areas, which can mean bruising or blood flow problems.     · You have peeling skin or tiny blisters between your toes or cracking or oozing of the skin.     · You have a fever for more than 24 hours and a foot sore.     · You have new numbness or tingling in your feet that does not go away after you move your feet or change positions.     · You have unexplained or unusual swelling of the foot or ankle. Watch closely for changes in your health, and be sure to contact your doctor if:    · You cannot do proper foot care. Where can you learn more? Go to http://www.gray.com/  Enter A739 in the search box to learn more about \"Diabetes Foot Health: Care Instructions. \"  Current as of: August 31, 2020               Content Version: 12.8  © 2006-2021 [employer]. Care instructions adapted under license by [employer] (which disclaims liability or warranty for this information). If you have questions about a medical condition or this instruction, always ask your healthcare professional. [address] any warranty or liability for your use of this information.

## 2021-08-18 ENCOUNTER — CLINICAL SUPPORT (OUTPATIENT)
Dept: DIABETES SERVICES | Age: 51
End: 2021-08-18
Payer: COMMERCIAL

## 2021-08-18 DIAGNOSIS — E11.9 CONTROLLED TYPE 2 DIABETES MELLITUS WITHOUT COMPLICATION, WITHOUT LONG-TERM CURRENT USE OF INSULIN (HCC): Primary | ICD-10-CM

## 2021-08-18 PROCEDURE — G0109 DIAB MANAGE TRN IND/GROUP: HCPCS | Performed by: DIETITIAN, REGISTERED

## 2021-08-19 NOTE — PROGRESS NOTES
[facility] Program for Diabetes Health  Diabetes Self-Management Education & Support Program  Encounter note    SUMMARY  Diabetes self-care management training was completed related to Physical activity and Taking medications. The participant will return in one week to continue DSMES related to Healthy coping and Problem solving. The participant will practice knowledge and skills related to being active and medications to improve diabetes self-management. EVALUATION:  Taylor Diehl actively participated in group class. Demonstrated chair exercises using resistance bands, balance ball, and stationary bicycle. Discussed easy ways to get exercise into their lifestyle. RECOMMENDATIONS:  Participant to work on [employer] goal. Call [address] [place] [address] if education questions or concerns arise. Contact provider with any medical concerns.           SMART GOAL(S)  1. Eat 3 meals daily for one week  ACHIEVEMENT OF GOAL(S)  [x]? 0-24%     [x]? 25-49%     []? 50-74%     [x]? %              DATE DSMES TOPIC EVALUATION     8/18/2021  HOW DO MY DIABETES MEDICATIONS WORK?   a. Type 1 medications & methods    Insulin injections    Injection sites   b. Type 2 medications    Oral agents    GLP-1 agonists   c. Hypoglycemia symptoms & treatment    Glucagon emergency kits   d. General guidance regarding insulin use whether Type 1, 2 or gestational diabetes    Storage of insulin    Disposal     Traveling with medications   e.  Barriers to medication adherence      The participant can describe the expected action & side effects of prescribed diabetes medications      Participant reports they are taking the following medications for diabetes:  Metformin and Glimepiride    Denies missing diabetes medications    Reports the following side effects from these medications: states she had an upset stomach for the first 2 weeks of taking medications- has been on diabetes medications only for 3 weeks     DATE DSMES TOPIC EVALUATION 8/18/2021 HOW DOES PHYSICAL ACTIVITY AFFECT MY DIABETES?   a. Benefits of physical activity   b. Beginning a program of physical activity    Walking    Pedometers    Goal setting   c. Structured physical activity program    Aerobic activity    Resistance    Flexibility    Balance   d. Physical activity program progression   e. Safety issues   f. Barriers to physical activity   g.  Facilitators of physical activity        The participant has established a regular physical activity plan No- states he is going to need to be better on exercising    Sindy BAKER Samantha Fonseca is physically active 2 times a week       Sly Medellin RDN, [facility] on 8/19/2021 at 11:04 AM    Time in appointment: 120 minutes

## 2021-08-25 ENCOUNTER — CLINICAL SUPPORT (OUTPATIENT)
Dept: DIABETES SERVICES | Age: 51
End: 2021-08-25
Payer: COMMERCIAL

## 2021-08-25 DIAGNOSIS — E11.9 CONTROLLED TYPE 2 DIABETES MELLITUS WITHOUT COMPLICATION, WITHOUT LONG-TERM CURRENT USE OF INSULIN (HCC): Primary | ICD-10-CM

## 2021-08-25 PROCEDURE — G0109 DIAB MANAGE TRN IND/GROUP: HCPCS

## 2021-08-26 NOTE — PROGRESS NOTES
[address] [place] [address] Diabetes Health  Diabetes Self-Management Education & Support Program  Encounter note     SUMMARY  Diabetes self-care management training was completed related to Healthy coping and Problem solving. The participant will return on October 11 for DSMES follow up appointment. The participant  will practice knowledge and skills related to healthy eating and monitoring, being active and medications and healthy coping and problem solving to improve diabetes self-management.     EVALUATION:  Ms. Dave Terry actively participated in group class discussions. She reported that she intends to continue to learn about diabetes by consulting with her provider and obtaining support from friends and co-workers. She reported that she is currently working on her emergency plan/kit and already has several items prepared including a medication list, emergency contact list, cash, provider information, and immunization list. She reported eating 3 meals daily this week following ADA guidelines for healthy meals.     RECOMMENDATIONS:  Ms. Dave Terry is willing to continue to follow ADA guidelines for healthy meals, monitor and record BG results, take medications as prescribed, engage in physical activity, and practice stress management techniques.     Next provider visit is scheduled for: 10-         SMART GOAL(S)  1. Eat 3 meals daily for 1 week  ACHIEVEMENT OF GOAL(S)  []? 0-24%     []? 25-49%     []? 50-74%     [x]? %         [facility]      8/26/2021 HOW DO I FIND SUPPORT TO TACKLE THIS CONDITION?   a. Normal responses to diabetes diagnosis or complication   · Shock   · Anger & resentment   · Guilt/self-blame   · Sadness & worry   · Depression    · Anxiety   · Pregnancy   b. Constructive strategies to normal responses    · Exploring feelings & attitudes   · Motivation: Cost versus benefits analysis   · Problem-solving: Chain analysis   · Obtaining support: Communicating   i.  Family & friends   ii. Health team   iii. Community   cJanett Noriega  · Symptoms   · Managing stress   · Fill your tool kit           The participant can identify people that support them in caring for their diabetes health: Yes     The participant would like to pursue the following in keeping themselves healthy after completing the program:  Healthcare Team/Provider and friends/co-workers        The participant would benefit from practicing stress management techniques.       DATE DSMES TOPIC EVALUATION      8/26/2021 HOW DO I FIGURE OUT WHAT'S INFLUENCING MY BLOOD GLUCOSES?   a. Problem solving using SOAR   · Set goals   · Sort options   · Arrive at a plan   · Reaffirm plan   b. Common problems in diabetes self-care   · Hypoglycemia   · Hyperglycemia   · Sick days   c. Pattern management   d.  Disaster preparedness plan           The participant has an action plan for   · Hypoglycemia Yes  · Hyperglycemia Yes  · Sick days Yes  · During disasters Working on this now     The participant would benefit from continue to  establish an emergency plan/kit.         Encounter date: 8/25/2021  Time in appointment: 121 minutes

## 2021-09-07 ENCOUNTER — HOSPITAL ENCOUNTER (OUTPATIENT)
Dept: MAMMOGRAPHY | Age: 51
Discharge: HOME | End: 2021-09-07
Attending: SURGERY
Payer: COMMERCIAL

## 2021-09-07 DIAGNOSIS — Z12.31 BREAST CANCER SCREENING BY MAMMOGRAM: ICD-10-CM

## 2021-09-07 DIAGNOSIS — R92.8 ABNORMAL MAMMOGRAM: ICD-10-CM

## 2021-09-07 PROCEDURE — 77063 BREAST TOMOSYNTHESIS BI: CPT

## 2021-10-11 ENCOUNTER — CLINICAL SUPPORT (OUTPATIENT)
Dept: DIABETES SERVICES | Age: 51
End: 2021-10-11
Payer: COMMERCIAL

## 2021-10-11 DIAGNOSIS — E11.9 CONTROLLED TYPE 2 DIABETES MELLITUS WITHOUT COMPLICATION, WITHOUT LONG-TERM CURRENT USE OF INSULIN (HCC): Primary | ICD-10-CM

## 2021-10-11 PROCEDURE — G0108 DIAB MANAGE TRN  PER INDIV: HCPCS

## 2021-10-11 NOTE — PROGRESS NOTES
[facility] Program for Diabetes Health  Diabetes Self-Management Education & Support Program  Post-program Evaluation    EVALUATION @ COMPLETION OF THE PROGRAM    Robbie Masters completed 9 hours of diabetes self-management education. The participant acquired new knowledge and demonstrated new skills during the program.     The participant worked on the following SMART GOAL(s) to improve their diabetes self-management:    1. Eat 3 meals daily for one week  ACHIEVEMENT OF GOAL(S)  [] 0-24%     [] 25-49%     [] 50-74%     [x] %  The participant's pre-program A1c was: 12.2 7/16/2021  Lab Results   Component Value Date/Time    Hemoglobin A1c 12.2 (H) 07/16/2021 12:00 AM    Hemoglobin A1c (POC) 11.1 (A) 08/16/2021 03:14 PM   . The post-evaluation A1c is 11.1 8/16/2021  The participant improved their Diabetes Skills, Confidence and Preparedness Index (scored out of 7): Total score: 6.1  Skills: 6.0  Confidence: 5.6  Preparedness: 6.8    FINAL RECOMMENDATIONS:  Ms. Nedra Ward may benefit from medication adjustments. Ms. Nedra Ward actively participated in discussions regarding diabetes self-management. She stated that within the last month \"things have been going terrible\", \"I have work overload and stress at [employer]", and  \"I have a feeling my blood work is not going to be good\". She stated that she \"hasn't been eating good\" and and has been \"drinking sweet tea and eating chips\". Therapeutic discussion regarding utilizing stress management skills, healthy eating following ADA guidelines, and performing physical activities daily. Ms. Nedra Ward stated she will \"take a couple of days off to help manage her work stress\" and will practice meditation/reading scripture daily for about 5-10 minutes. She reported that she has a scheduled appointment with her provider on Monday.      Next provider visit is scheduled for 10-       Will Cruz RN on 10/11/2021 at 11:48 AM    Metric Patient's responses (10/11/2021) Areas for improvement     Healthy Eating       The participant is using the Healthy Plate to control carbohydrate intake No    The participant reads food labels accurately Yes     Ms. Mary Jo Lucero reported that she is eating 3 meals daily but is not following ADA guidelines for healthy meals. Reported 24 hour food recall includes: breakfast- pre-made smoothie drink ; lunch-sausage and pepperoni pizza; dinner-sausage and pepperoni pizza; snacks-bbq chips; beverages-sweet tea, water     May benefit from following ADA guidelines for healthy meals. Being Active       The participant performs physical activity where your heart beats faster and your breathing is harder than normal for 30 minutes or more?  0 days    In a typical week, the participant performs physical activity 0 days     May benefit from performing daily physical activities. Monitoring   The participant is monitoring their blood sugars? Yes    The participant is monitoring 1x/day    Blood glucoses are rangin-300 mg/dL    The participant improved their A1c Yes    The participant understands the meaning of the A1c Yes     May benefit from recording BG results and communicating results with provider. Taking Medications   The participant understands what their diabetes medications do Yes    The participant can afford your diabetes medications Yes    The participant does not miss doses of their diabetes medications Never      May benefit from medication adjustments.      Healthy Coping     The participant feels supported by family, friends and others related to their diabetes self-care practices Yes    The participant plans on utilizing the following community resources after completion of the program: healthcare team/provider, friends, websites         Reducing Risks   Vaccines:  Influenza:   Immunization History   Administered Date(s) Administered    Influenza Vaccine 2019    Influenza Vaccine (Quad) Mdck Pf (>2 Yrs Flucelvax QUAD [id number]) 2019, 10/05/2020       Pneumococcal: There is no immunization history for the selected administration types on file for this patient. Hepatitis: There is no immunization history for the selected administration types on file for this patient. Examinations:  Diabetic Foot and Eye Exam HM Status   Topic Date Due    Eye Exam  Never done    Diabetic Foot Care  08/16/2022      Eye exam: Last appointment was: 7/2021    Dental exam: Last appointment was: 7/2021    Foot exam: Last appointment was: 7/2021    Heart Protection:  BP Readings from Last 2 Encounters:   08/16/21 126/74   07/21/21 120/80        Lab Results   Component Value Date/Time    LDL, calculated 148 (H) 07/16/2021 12:00 AM    LDL, calculated 83 01/27/2020 11:34 AM        Key Anti-Platelet Anticoagulant Meds     The patient is on no antiplatelet meds or anticoagulants. Kidney Protection:  Lab Results   Component Value Date/Time    Microalb/Creat ratio (ug/mg creat.) 12 07/16/2021 12:00 AM      The participant would benefit from additional attention to:    Vaccines:  [] Influenza  [x] Pneumococcal  [] Hepatitis    Examinations:  [] Dilated eye exam  [] Dental exam  [] Foot exam    Other:  [] Reviewing long-term complications     Problem Solving   Hypoglycemia Management:  The participant knows the signs and symptoms of hypoglycemia Sleepiness, Headache, disoriented    The participant knows how to prevent hypoglycemia? Consistent meals/snack times    The participant knows how to treat hypoglycemia? uses life savers candies    Hyperglycemia Management:  The participant knows their signs and symptoms of hyperglycemia headache, muscle aches, feeling like I might pass out    The participant knows how to prevent hyperglycemia?  Take medication as instructed    Sick Day Management:  The participant knows what to do differently on sick days? call provider    Pattern Management:  The participant can notice blood glucose patterns when looking at their blood glucose readings No           Note: Content derived from the American Association of Diabetes Educators' Diabetes Education Curriculum: A Guide to Successful Self-Management (3rd edition)      Encounter date: 10/11/2021  Time in appointment: 42 minutes    Diabetes Skills, Confidence & Preparedness Index (SCPI) ©  Thank you for completing the Skills, Confidence & Preparedness Index! Below are your scores. All scales and questions are out of 7. If you would like these results emailed, please enter your email address along with some identifying patient information. Email:    Patient Identifier:   Overall SCPI score: 6.1 Skills Score: 6.0  Low: Healthy Eating(Q1),Blood Sugar Monitoring(Q4),Blood Sugar Monitoring(Q8) Confidence Score: 5.6  Low: Healthy Eating(Q1),Reducing Risks(Q3),Being Active(Q5) Preparedness Score: 6.8  Low: Being Active(Q2)  Healthy Eating Score: 5.8  Low: Skills(Q1),Confidence(Q1) Taking Medication Score: 7.0  Low: Skills(Q2) Blood Sugar Monitoring Score: 5.8  Low: Skills(Q4),Skills(Q8) Reducing Risks Score: 6.3  Low: Confidence(Q3)  Problem Solving Score: 6.3  Low: Skills(Q6),Confidence(Q7) Healthy Coping Score: 6.7  Low: Confidence(Q2) Being Active Score: 5.5  Low: Confidence(Q5)  Skills/Knowledge Questions  1. I know how to plan meals that have the best balance between carbohydrates, proteins and vegetables. 5  2. I know how my diabetes medications (pills, injectables and/or insulin) work in my body. 7  3. I know when to check my blood sugar if I want to see how my body responded to a meal. 6  4. I know when to check my blood sugars to determine if my medication or insulin doses are correct. 5  5. I know what to do to prevent a low blood sugar when I exercise (either before, during, or after). 6  6. When I am sick, I know what to do differently with my diabetes management. 6  7. I know how stress can affect my diabetes management. 7  8.  When I look at my blood sugars over a given week, I can explain what my blood sugar pattern is. 5  9. I know what my target levels are for A1c, blood pressure and cholesterol. 7  Confidence Questions  1. I am confident that I can plan balanced meals and snacks. 5  2. I am confident that I can manage my stress. 6  3. I am confident that I can prevent a low blood sugar during or after exercise. 5  4. I am confident that the next time I eat out, I will be able to choose foods that best keep my blood sugars in target. 6  5. I am confident I can include exercise into my schedule. 5  6. I am confident that I can use my daily blood sugars to adjust my diet, my activity, and/or my insulin. 6  7. When something out of my normal routine happens, I am confident that I can problem-solve and keep my diabetes on track. 6  Preparedness Questions  1. Within the next month, I will begin to eat more balanced meals and snacks. 7  2. Within the next month, I will choose an exercise activity and I will start fitting it into my schedule. 6  3. Within the next month, I will make a list of stress management options that work for me. 7  4. Within the next month, I will consistently plan ahead to prevent low blood sugars. 7  5. Within the next month, I will start adjusting my insulin doses on my own. 0  6. Within the next month, I will begin making changes to my diabetes management based on my daily blood sugars (eg - eating, activity and/or insulin). 8  7. Within the next month, I will begin making changes to my diabetes management to meet my overall goals (eg - eating, activity and/or insulin).  8

## 2021-10-18 ENCOUNTER — OFFICE VISIT (OUTPATIENT)
Dept: INTERNAL MEDICINE CLINIC | Age: 51
End: 2021-10-18
Payer: COMMERCIAL

## 2021-10-18 VITALS
RESPIRATION RATE: 16 BRPM | BODY MASS INDEX: 44.09 KG/M2 | OXYGEN SATURATION: 98 % | DIASTOLIC BLOOD PRESSURE: 82 MMHG | SYSTOLIC BLOOD PRESSURE: 126 MMHG | TEMPERATURE: 97.3 F | HEART RATE: 88 BPM | HEIGHT: 67 IN | WEIGHT: 280.9 LBS

## 2021-10-18 DIAGNOSIS — E78.2 MIXED HYPERLIPIDEMIA: ICD-10-CM

## 2021-10-18 DIAGNOSIS — E11.65 UNCONTROLLED TYPE 2 DIABETES MELLITUS WITH HYPERGLYCEMIA (HCC): Primary | ICD-10-CM

## 2021-10-18 DIAGNOSIS — I10 ESSENTIAL HYPERTENSION: ICD-10-CM

## 2021-10-18 PROCEDURE — 99213 OFFICE O/P EST LOW 20 MIN: CPT | Performed by: PHYSICIAN ASSISTANT

## 2021-10-18 PROCEDURE — 3052F HG A1C>EQUAL 8.0%<EQUAL 9.0%: CPT | Performed by: PHYSICIAN ASSISTANT

## 2021-10-18 RX ORDER — GLIMEPIRIDE 4 MG/1
4 TABLET ORAL
Qty: 90 TABLET | Refills: 1 | Status: SHIPPED | OUTPATIENT
Start: 2021-10-18 | End: 2022-04-28

## 2021-10-18 NOTE — PATIENT INSTRUCTIONS
Heart-Healthy Diet: Care Instructions  Your Care Instructions     A heart-healthy diet has lots of vegetables, fruits, nuts, beans, and whole grains, and is low in salt. It limits foods that are high in saturated fat, such as meats, cheeses, and fried foods. It may be hard to change your diet, but even small changes can lower your risk of heart attack and heart disease. Follow-up care is a key part of your treatment and safety. Be sure to make and go to all appointments, and call your doctor if you are having problems. It's also a good idea to know your test results and keep a list of the medicines you take. How can you care for yourself at home? Watch your portions  · Use food labels to learn what the recommended servings are for the foods you eat. · Eat only the number of calories you need to stay at a healthy weight. If you need to lose weight, eat fewer calories than your body burns (through exercise and other physical activity). Eat more fruits and vegetables  · Eat a variety of fruit and vegetables every day. Dark green, deep orange, red, or yellow fruits and vegetables are especially good for you. Examples include spinach, carrots, peaches, and berries. · Keep carrots, celery, and other veggies handy for snacks. Buy fruit that is in season and store it where you can see it so that you will be tempted to eat it. · Cook dishes that have a lot of veggies in them, such as stir-fries and soups. Limit saturated fat  · Read food labels, and try to avoid saturated fats. They increase your risk of heart disease. · Use olive or canola oil when you cook. · Bake, broil, grill, or steam foods instead of frying them. · Choose lean meats instead of high-fat meats such as hot dogs and sausages. Cut off all visible fat when you prepare meat. · Eat fish, skinless poultry, and meat alternatives such as soy products instead of high-fat meats.  Soy products, such as tofu, may be especially good for your heart.  · Choose low-fat or fat-free milk and dairy products. Eat foods high in fiber  · Eat a variety of grain products every day. Include whole-grain foods that have lots of fiber and nutrients. Examples of whole-grain foods include oats, whole wheat bread, and brown rice. · Buy whole-grain breads and cereals, instead of white bread or pastries. Limit salt and sodium  · Limit how much salt and sodium you eat to help lower your blood pressure. · Taste food before you salt it. Add only a little salt when you think you need it. With time, your taste buds will adjust to less salt. · Eat fewer snack items, fast foods, and other high-salt, processed foods. Check food labels for the amount of sodium in packaged foods. · Choose low-sodium versions of canned goods (such as soups, vegetables, and beans). Limit sugar  · Limit drinks and foods with added sugar. These include candy, desserts, and soda pop. Limit alcohol  · Limit alcohol to no more than 2 drinks a day for men and 1 drink a day for women. Too much alcohol can cause health problems. When should you call for help? Watch closely for changes in your health, and be sure to contact your doctor if:    · You would like help planning heart-healthy meals. Where can you learn more? Go to http://www.kaplan.com/  Enter V137 in the search box to learn more about \"Heart-Healthy Diet: Care Instructions. \"  Current as of: December 17, 2020               Content Version: 13.0  © 3837-8316 [employer]. Care instructions adapted under license by [employer] (which disclaims liability or warranty for this information). If you have questions about a medical condition or this instruction, always ask your healthcare professional. [place] [address] any warranty or liability for your use of this information.

## 2021-10-18 NOTE — PROGRESS NOTES
HPI:  45 y.o.  presents for follow up appointment. No hospital, ER or specialist visits since last primary care visit except as noted below. Last visit 8/16/21  Had labs done 10/11/21 to review today    DM - current A1C 8.3% on 10/11/21; previously A1C 11.1% on 8/16/2021, and 12.2% on 7/2021  -now on glimepiride 4 mg qAM (increased from 2 mg 8/16/21), and metformin XR 1000 mg BID  -on ARB and atorvastatin  -she was able to get glucometer since last visit, BS was 250s when eating poorly with sweet tea and fast food  -now she has improved her diet, and BS 150s-160s  -she has now gone 1 month without chips  -she is walking more and drinking more water  -she has stopped shopping on the chips and cookie aisle of the grocery store   -she has completed DEP  (-)microalbumin 7/2021  Eye exam 7/2021, record requested at last visit 8/2021      She reports previously she was under a lot of stress at work, not eating right, having more snacks and fast food  She works as special ed , 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 No history of heart disease or stroke. No chest pain, no shortness of breath, no headaches, no lower extremity swelling.         Dyslipidemia - on atorvastatin 40 mg  LDL 67 on 10/11/21, previously 148 on 7/2021      Patient Active Problem List    Diagnosis    Vitamin D deficiency    Controlled type 2 diabetes mellitus without complication, without long-term current use of insulin ([address]    Breast lesion on mammography    Mixed hyperlipidemia    Class 3 severe obesity with serious comorbidity and body mass index (BMI) of 40.0 to 44.9 in adult ([facility]    Hypertension    Pseudoseizure         Past Medical History:   Diagnosis Date    Altered mental status, unspecified 3/2/2018    Breast lesion on mammography 07/18/2021    right breast, benign node unchanged on 6 month follow up    Diabetes ([facility]     Heart murmur     Hypertension     Ill-defined condition     pseudo seizure    Syncope and collapse 5/21/2018       Social History     Tobacco Use    Smoking status: Never Smoker    Smokeless tobacco: Never Used   Vaping Use    Vaping Use: Never used   Substance Use Topics    Alcohol use: No    Drug use: No       Outpatient Medications Marked as Taking for the 10/18/21 encounter (Office Visit) with Orly Cunningham PA-C   Medication Sig Dispense Refill    glimepiride (AMARYL) 4 mg tablet Take 1 Tablet by mouth every morning. With food/breakfast. (THIS IS A NEW AND HIGHER DOSE) 90 Tablet 0    glucose blood VI test strips (ASCENSIA AUTODISC VI, ONE TOUCH ULTRA TEST VI) strip Check blood sugar once daily. Please dispense brand preferred by insurance. Dx: E11.65 100 Strip 3    Blood-Glucose Meter monitoring kit Use to monitor blood sugar. Please dispense the brand preferred by insurance. Dx: E11.65 1 Kit 0    lancets misc Use to check blood sugar once daily Dx: E11.65 100 Each 3    metoprolol succinate (TOPROL-XL) 50 mg XL tablet Take 1 Tablet by mouth nightly. 90 Tablet 2    atorvastatin (LIPITOR) 40 mg tablet Take 1 Tablet by mouth daily. 90 Tablet 1    olmesartan-hydroCHLOROthiazide (BENICAR HCT) 20-12.5 mg per tablet Take 1 Tablet by mouth daily. 90 Tablet 1    metFORMIN ER (GLUCOPHAGE XR) 500 mg tablet Take 2 Tablets by mouth two (2) times daily (with meals). Slowly increase up to 2 pills twice a day with meals as discussed 360 Tablet 1    levonorgestreL (MIRENA) 20 mcg/24 hours (5 yrs) 52 mg IUD 1 Device by IntraUTERine route once.          Allergies   Allergen Reactions    Decadron [Dexamethasone] Other (comments)     Joint swelling, rash       ROS:  ROS negative except as per HPI. PE:  Visit Vitals  /82 (BP 1 Location: Left arm, BP Patient Position: Sitting, BP Cuff Size: Adult)   Pulse 88   Temp 97.3 °F (36.3 °C) (Temporal)   Resp 16   Ht 5' 7\" (1.702 m)   Wt 280 lb 14.4 oz (127.4 kg)   SpO2 98%   BMI 44.00 kg/m²     Weight down 3 lbs from August 2021    Gen: alert, oriented, no acute distress  Head: normocephalic, atraumatic  Eyes: pupils equal round reactive to light, sclera clear, conjunctiva clear  Oral: masked  Neck: supple  Resp: no increase work of breathing, lungs clear to ausculation bilaterally, no wheezing, rales or rhonchi  CV: S1, S2 normal.  No murmurs, rubs, or gallops. Abd: soft, not tender, not distended. Neuro: grossly intact  Skin: no lesion or rash  Extremities: no cyanosis or edema    No results found for this visit on 10/18/21. Lab Results   Component Value Date/Time    Sodium 141 10/11/2021 08:06 AM    Potassium 4.5 10/11/2021 08:06 AM    Chloride 106 10/11/2021 08:06 AM    CO2 24 10/11/2021 08:06 AM    Anion gap 6 02/23/2018 01:00 PM    Glucose 109 (H) 10/11/2021 08:06 AM    BUN 13 10/11/2021 08:06 AM    Creatinine 0.76 10/11/2021 08:06 AM    BUN/Creatinine ratio 17 10/11/2021 08:06 AM    GFR est  10/11/2021 08:06 AM    GFR est non-AA 90 10/11/2021 08:06 AM    Calcium 9.3 10/11/2021 08:06 AM    Bilirubin, total 0.5 10/11/2021 08:06 AM    Alk.  phosphatase 106 10/11/2021 08:06 AM    Protein, total 6.5 10/11/2021 08:06 AM    Albumin 3.6 (L) 10/11/2021 08:06 AM    Globulin 4.8 (H) 02/23/2018 01:00 PM    A-G Ratio 1.2 10/11/2021 08:06 AM    ALT (SGPT) 25 10/11/2021 08:06 AM    AST (SGOT) 19 10/11/2021 08:06 AM     Lab Results   Component Value Date/Time    Hemoglobin A1c 8.3 (H) 10/11/2021 08:06 AM    Hemoglobin A1c (POC) 11.1 (A) 08/16/2021 03:14 PM     Lab Results   Component Value Date/Time    Cholesterol, total 117 10/11/2021 08:06 AM    HDL Cholesterol 36 (L) 10/11/2021 08:06 AM    LDL, calculated 67 10/11/2021 08:06 AM    LDL, calculated 83 01/27/2020 11:34 AM    VLDL, calculated 14 10/11/2021 08:06 AM    VLDL, calculated 15 01/27/2020 11:34 AM    Triglyceride 65 10/11/2021 08:06 AM         Assessment/Plan:      ICD-10-CM ICD-9-CM    1. Uncontrolled type 2 diabetes mellitus with hyperglycemia (HCC)  E11.65 250.02 glimepiride (AMARYL) 4 mg tablet   2. Essential hypertension  I10 401.9    3. Mixed hyperlipidemia  E78.2 272.2      A1C now 8.3%, improved from 12.2% in July 2021  Current therapy: glimepiride 4 mg qAM (increased from 2 mg 8/16/21), and metformin XR 1000 mg BID  Continue current therapy  On ARB and statin  BP and lipids controlled and at goal, continue current regimen   Praised her efforts on dietary and lifestyle changes and her motivation    F/U 3 mos with POC A1C at next visit    Health Maintenance reviewed - updated. Orders Placed This Encounter    glimepiride (AMARYL) 4 mg tablet     Sig: Take 1 Tablet by mouth every morning. With food/breakfast.     Dispense:  90 Tablet     Refill:  1       Medications Discontinued During This Encounter   Medication Reason    glimepiride (AMARYL) 4 mg tablet REORDER       Current Outpatient Medications   Medication Sig Dispense Refill    glimepiride (AMARYL) 4 mg tablet Take 1 Tablet by mouth every morning. With food/breakfast. 90 Tablet 1    glucose blood VI test strips (ASCENSIA AUTODISC VI, ONE TOUCH ULTRA TEST VI) strip Check blood sugar once daily. Please dispense brand preferred by insurance. Dx: E11.65 100 Strip 3    Blood-Glucose Meter monitoring kit Use to monitor blood sugar. Please dispense the brand preferred by insurance. Dx: E11.65 1 Kit 0    lancets misc Use to check blood sugar once daily Dx: E11.65 100 Each 3    metoprolol succinate (TOPROL-XL) 50 mg XL tablet Take 1 Tablet by mouth nightly. 90 Tablet 2    atorvastatin (LIPITOR) 40 mg tablet Take 1 Tablet by mouth daily.  90 Tablet 1    olmesartan-hydroCHLOROthiazide (BENICAR HCT) 20-12.5 mg per tablet Take 1 Tablet by mouth daily. 90 Tablet 1    metFORMIN ER (GLUCOPHAGE XR) 500 mg tablet Take 2 Tablets by mouth two (2) times daily (with meals). Slowly increase up to 2 pills twice a day with meals as discussed 360 Tablet 1    levonorgestreL (MIRENA) 20 mcg/24 hours (5 yrs) 52 mg IUD 1 Device by IntraUTERine route once. Recommended healthy diet low in carbohydrates, fats, sodium and cholesterol. Recommended regular cardiovascular exercise 3-6 times per week for 30-60 minutes daily. Verbal and written instructions (see AVS) provided. Patient expresses understanding of diagnosis and treatment plan. Follow-up and Dispositions    · Return in about 3 months (around 1/18/2022) for DM.        POC A1C next visit

## 2021-10-18 NOTE — PROGRESS NOTES
Myrle Hammans is a 45 y.o. female     Chief Complaint   Patient presents with    Diabetes     2M follow up    Cholesterol Problem     2M follow up       Visit Vitals  /82 (BP 1 Location: Left arm, BP Patient Position: Sitting, BP Cuff Size: Adult)   Pulse 88   Temp 97.3 °F (36.3 °C) (Temporal)   Resp 16   Ht 5' 7\" (1.702 m)   Wt 280 lb 14.4 oz (127.4 kg)   SpO2 98%   BMI 44.00 kg/m²       Health Maintenance Due   Topic Date Due    Eye Exam Retinal or Dilated  Never done    Cervical cancer screen  Never done    Colorectal Cancer Screening Combo  Never done       1. Have you been to the ER, urgent care clinic since your last visit? Hospitalized since your last visit? No     2. Have you seen or consulted any other health care providers outside of the [address] [place] [address] since your last visit? Include any pap smears or colon screening.  No

## 2022-01-25 DIAGNOSIS — I10 ESSENTIAL HYPERTENSION: ICD-10-CM

## 2022-01-25 DIAGNOSIS — E78.2 MIXED HYPERLIPIDEMIA: ICD-10-CM

## 2022-01-26 RX ORDER — OLMESARTAN MEDOXOMIL AND HYDROCHLOROTHIAZIDE 20/12.5 20; 12.5 MG/1; MG/1
TABLET ORAL
Qty: 90 TABLET | Refills: 1 | Status: SHIPPED | OUTPATIENT
Start: 2022-01-26 | End: 2022-07-20

## 2022-01-26 RX ORDER — ATORVASTATIN CALCIUM 40 MG/1
TABLET, FILM COATED ORAL
Qty: 90 TABLET | Refills: 1 | Status: SHIPPED | OUTPATIENT
Start: 2022-01-26 | End: 2022-07-20

## 2022-01-31 ENCOUNTER — OFFICE VISIT (OUTPATIENT)
Dept: INTERNAL MEDICINE CLINIC | Age: 52
End: 2022-01-31
Payer: COMMERCIAL

## 2022-01-31 VITALS
TEMPERATURE: 97.8 F | HEIGHT: 67 IN | SYSTOLIC BLOOD PRESSURE: 136 MMHG | HEART RATE: 85 BPM | RESPIRATION RATE: 18 BRPM | DIASTOLIC BLOOD PRESSURE: 80 MMHG | OXYGEN SATURATION: 98 % | BODY MASS INDEX: 44.61 KG/M2 | WEIGHT: 284.2 LBS

## 2022-01-31 DIAGNOSIS — E78.2 MIXED HYPERLIPIDEMIA: ICD-10-CM

## 2022-01-31 DIAGNOSIS — E11.65 UNCONTROLLED TYPE 2 DIABETES MELLITUS WITH HYPERGLYCEMIA (HCC): Primary | ICD-10-CM

## 2022-01-31 DIAGNOSIS — E55.9 VITAMIN D DEFICIENCY: ICD-10-CM

## 2022-01-31 DIAGNOSIS — E66.01 OBESITY, CLASS III, BMI 40-49.9 (MORBID OBESITY) (HCC): ICD-10-CM

## 2022-01-31 DIAGNOSIS — I10 ESSENTIAL HYPERTENSION: ICD-10-CM

## 2022-01-31 LAB — HBA1C MFR BLD HPLC: 7 % (ref 4.5–5.7)

## 2022-01-31 PROCEDURE — 3051F HG A1C>EQUAL 7.0%<8.0%: CPT | Performed by: PHYSICIAN ASSISTANT

## 2022-01-31 PROCEDURE — 83036 HEMOGLOBIN GLYCOSYLATED A1C: CPT | Performed by: PHYSICIAN ASSISTANT

## 2022-01-31 PROCEDURE — 99213 OFFICE O/P EST LOW 20 MIN: CPT | Performed by: PHYSICIAN ASSISTANT

## 2022-01-31 RX ORDER — METFORMIN HYDROCHLORIDE 500 MG/1
1000 TABLET, EXTENDED RELEASE ORAL 2 TIMES DAILY WITH MEALS
Qty: 360 TABLET | Refills: 1 | Status: SHIPPED | OUTPATIENT
Start: 2022-01-31 | End: 2022-08-03

## 2022-01-31 NOTE — PATIENT INSTRUCTIONS

## 2022-01-31 NOTE — PROGRESS NOTES
Zoya Alejandra is a 45 y.o. female     Chief Complaint   Patient presents with    Diabetes     3M follow       Visit Vitals  /80 (BP 1 Location: Left arm, BP Patient Position: Sitting, BP Cuff Size: Adult)   Pulse 85   Temp 97.8 °F (36.6 °C) (Temporal)   Resp 18   Ht 5' 7\" (1.702 m)   Wt 284 lb 3.2 oz (128.9 kg)   SpO2 98%   BMI 44.51 kg/m²       Health Maintenance Due   Topic Date Due    Eye Exam Retinal or Dilated  Never done    DTaP/Tdap/Td series (1 - Tdap) Never done    Cervical cancer screen  Never done    Shingrix Vaccine Age 50> (1 of 2) Never done       1. Have you been to the ER, urgent care clinic since your last visit? Hospitalized since your last visit? No     2. Have you seen or consulted any other health care providers outside of the [address] [place] [address] since your last visit? Include any pap smears or colon screening.  No

## 2022-03-18 PROBLEM — F44.5 PSEUDOSEIZURE: Status: ACTIVE | Noted: 2018-03-02

## 2022-03-19 PROBLEM — E55.9 VITAMIN D DEFICIENCY: Status: ACTIVE | Noted: 2021-07-18

## 2022-03-19 PROBLEM — R92.8 BREAST LESION ON MAMMOGRAPHY: Status: ACTIVE | Noted: 2021-07-18

## 2022-03-19 PROBLEM — I10 HYPERTENSION: Status: ACTIVE | Noted: 2018-05-21

## 2022-03-20 PROBLEM — E11.9 CONTROLLED TYPE 2 DIABETES MELLITUS WITHOUT COMPLICATION, WITHOUT LONG-TERM CURRENT USE OF INSULIN (HCC): Status: ACTIVE | Noted: 2021-07-18

## 2022-04-22 RX ORDER — METOPROLOL SUCCINATE 50 MG/1
TABLET, EXTENDED RELEASE ORAL
Qty: 90 TABLET | Refills: 2 | Status: SHIPPED | OUTPATIENT
Start: 2022-04-22

## 2022-04-22 NOTE — TELEPHONE ENCOUNTER
Cardiologist: Dr. Mikey Hunter    Last appt: 7/21/2021  Future Appointments   Date Time Provider Shereen Tam   4/25/2022  8:00 AM LAB ONLY [facility] AMB   5/2/2022  9:30 AM Orly Martinez PA-C [facility] AMB   7/21/2022  8:20 AM Deonte Farrell MD [facility] AMB       Requested Prescriptions     Signed Prescriptions Disp Refills    metoprolol succinate (TOPROL-XL) 50 mg XL tablet 90 Tablet 2     Sig: TAKE 1 TABLET BY MOUTH EVERY DAY AT NIGHT     Authorizing Provider: Rosa Recio     Ordering User: ALIZE QUIROS         Refills VO per Dr. Mikey Hunter.

## 2022-04-25 ENCOUNTER — APPOINTMENT (OUTPATIENT)
Dept: INTERNAL MEDICINE CLINIC | Age: 52
End: 2022-04-25

## 2022-04-25 DIAGNOSIS — E66.01 OBESITY, CLASS III, BMI 40-49.9 (MORBID OBESITY) (HCC): ICD-10-CM

## 2022-04-25 DIAGNOSIS — E11.65 UNCONTROLLED TYPE 2 DIABETES MELLITUS WITH HYPERGLYCEMIA (HCC): ICD-10-CM

## 2022-04-25 DIAGNOSIS — E55.9 VITAMIN D DEFICIENCY: ICD-10-CM

## 2022-04-25 DIAGNOSIS — E78.2 MIXED HYPERLIPIDEMIA: ICD-10-CM

## 2022-04-26 LAB
25(OH)D3+25(OH)D2 SERPL-MCNC: 25.8 NG/ML (ref 30–100)
ALBUMIN SERPL-MCNC: 3.8 G/DL (ref 3.8–4.9)
ALBUMIN/GLOB SERPL: 1.3 {RATIO} (ref 1.2–2.2)
ALP SERPL-CCNC: 101 IU/L (ref 44–121)
ALT SERPL-CCNC: 21 IU/L (ref 0–32)
AST SERPL-CCNC: 15 IU/L (ref 0–40)
BILIRUB SERPL-MCNC: 0.5 MG/DL (ref 0–1.2)
BUN SERPL-MCNC: 10 MG/DL (ref 6–24)
BUN/CREAT SERPL: 14 (ref 9–23)
CALCIUM SERPL-MCNC: 9.3 MG/DL (ref 8.7–10.2)
CHLORIDE SERPL-SCNC: 100 MMOL/L (ref 96–106)
CHOLEST SERPL-MCNC: 115 MG/DL (ref 100–199)
CO2 SERPL-SCNC: 23 MMOL/L (ref 20–29)
CREAT SERPL-MCNC: 0.74 MG/DL (ref 0.57–1)
EGFR: 97 ML/MIN/1.73
EST. AVERAGE GLUCOSE BLD GHB EST-MCNC: 163 MG/DL
GLOBULIN SER CALC-MCNC: 3 G/DL (ref 1.5–4.5)
GLUCOSE SERPL-MCNC: 122 MG/DL (ref 65–99)
HBA1C MFR BLD: 7.3 % (ref 4.8–5.6)
HDLC SERPL-MCNC: 37 MG/DL
LDLC SERPL CALC-MCNC: 65 MG/DL (ref 0–99)
POTASSIUM SERPL-SCNC: 4.5 MMOL/L (ref 3.5–5.2)
PROT SERPL-MCNC: 6.8 G/DL (ref 6–8.5)
SODIUM SERPL-SCNC: 138 MMOL/L (ref 134–144)
TRIGL SERPL-MCNC: 56 MG/DL (ref 0–149)
TSH SERPL DL<=0.005 MIU/L-ACNC: 2 UIU/ML (ref 0.45–4.5)
VLDLC SERPL CALC-MCNC: 13 MG/DL (ref 5–40)

## 2022-04-27 DIAGNOSIS — E11.65 UNCONTROLLED TYPE 2 DIABETES MELLITUS WITH HYPERGLYCEMIA (HCC): ICD-10-CM

## 2022-04-28 RX ORDER — GLIMEPIRIDE 4 MG/1
4 TABLET ORAL
Qty: 90 TABLET | Refills: 1 | Status: SHIPPED | OUTPATIENT
Start: 2022-04-28 | End: 2022-10-21

## 2022-05-02 ENCOUNTER — OFFICE VISIT (OUTPATIENT)
Dept: INTERNAL MEDICINE CLINIC | Age: 52
End: 2022-05-02
Payer: COMMERCIAL

## 2022-05-02 VITALS
BODY MASS INDEX: 45.39 KG/M2 | SYSTOLIC BLOOD PRESSURE: 130 MMHG | OXYGEN SATURATION: 97 % | HEART RATE: 74 BPM | TEMPERATURE: 97.1 F | DIASTOLIC BLOOD PRESSURE: 72 MMHG | WEIGHT: 289.2 LBS | HEIGHT: 67 IN | RESPIRATION RATE: 18 BRPM

## 2022-05-02 DIAGNOSIS — E78.2 MIXED HYPERLIPIDEMIA: ICD-10-CM

## 2022-05-02 DIAGNOSIS — E55.9 VITAMIN D DEFICIENCY: ICD-10-CM

## 2022-05-02 DIAGNOSIS — E11.65 UNCONTROLLED TYPE 2 DIABETES MELLITUS WITH HYPERGLYCEMIA (HCC): Primary | ICD-10-CM

## 2022-05-02 DIAGNOSIS — I10 ESSENTIAL HYPERTENSION: ICD-10-CM

## 2022-05-02 PROCEDURE — 99214 OFFICE O/P EST MOD 30 MIN: CPT | Performed by: PHYSICIAN ASSISTANT

## 2022-05-02 PROCEDURE — 3051F HG A1C>EQUAL 7.0%<8.0%: CPT | Performed by: PHYSICIAN ASSISTANT

## 2022-05-02 RX ORDER — ERGOCALCIFEROL 1.25 MG/1
50000 CAPSULE ORAL
Qty: 13 CAPSULE | Refills: 0 | Status: SHIPPED | OUTPATIENT
Start: 2022-05-02 | End: 2022-07-31

## 2022-05-02 NOTE — PROGRESS NOTES
HPI:  45 y.o.  presents for follow up appointment. No hospital, ER or specialist visits since last primary care visit except as noted below. 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); due to complete school in July 2022  She will complete grad school in 2 mos  She is eating more \"junk food\" when up late at night studying  The last 8 wks is just a project, she will find out next week if she needs anything further  She also continues to be busy at work    Dyslipidemia - on atorvastatin 40 mg  LDL 65 on 4/25/22; 67 on 10/11/21, previously 148 on 7/2021      Hypertension - on metoprolol XL 50 mg QHS (lowered from BID by Dr Dar España 7/2021, started by cardiology for elevated BP, MVP and LVH), and olmesartan/HCTZ 20/12.5 daily; compliant with medication, no home monitoring.  No history of heart disease or stroke.  No chest pain, no shortness of breath, no headaches, no lower extremity swelling.      Vit D Deficiency - completed ergo 50K July  -Sept 2021  Currently not on suppl  Level 25 on 4/2022    Patient Active Problem List    Diagnosis    Vitamin D deficiency    Controlled type 2 diabetes mellitus without complication, without long-term current use of insulin ([address]    Breast lesion on mammography    Mixed hyperlipidemia    Class 3 severe obesity with serious comorbidity and body mass index (BMI) of 40.0 to 44.9 in adult [facility])    Hypertension    Pseudoseizure         Past Medical History:   Diagnosis Date    Altered mental status, unspecified 3/2/2018    Breast lesion on mammography 07/18/2021    right breast, benign node unchanged on 6 month follow up    [facility]     Heart murmur     Hypertension     Ill-defined condition     pseudo seizure    Syncope and collapse 5/21/2018       Social History     Tobacco Use    Smoking status: Never Smoker    Smokeless tobacco: Never Used   Vaping Use    Vaping Use: Never used   Substance Use Topics    Alcohol use: No    Drug use: No       Outpatient Medications Marked as Taking for the 5/2/22 encounter (Office Visit) with Orly Martinez PA-C   Medication Sig Dispense Refill    glimepiride (AMARYL) 4 mg tablet TAKE 1 TABLET BY MOUTH EVERY MORNING. WITH FOOD/BREAKFAST. 90 Tablet 1    metoprolol succinate (TOPROL-XL) 50 mg XL tablet TAKE 1 TABLET BY MOUTH EVERY DAY AT NIGHT 90 Tablet 2    metFORMIN ER (GLUCOPHAGE XR) 500 mg tablet Take 2 Tablets by mouth two (2) times daily (with meals). 360 Tablet 1    olmesartan-hydroCHLOROthiazide (BENICAR HCT) 20-12.5 mg per tablet TAKE 1 TABLET BY MOUTH EVERY DAY 90 Tablet 1    atorvastatin (LIPITOR) 40 mg tablet TAKE 1 TABLET BY MOUTH EVERY DAY 90 Tablet 1    glucose blood VI test strips (ASCENSIA AUTODISC VI, ONE TOUCH ULTRA TEST VI) strip Check blood sugar once daily. Please dispense brand preferred by insurance. Dx: E11.65 100 Strip 3    Blood-Glucose Meter monitoring kit Use to monitor blood sugar. Please dispense the brand preferred by insurance. Dx: E11.65 1 Kit 0    lancets misc Use to check blood sugar once daily Dx: E11.65 100 Each 3    levonorgestreL (MIRENA) 20 mcg/24 hours (5 yrs) 52 mg IUD 1 Device by IntraUTERine route once. Allergies   Allergen Reactions    Decadron [Dexamethasone] Other (comments)     Joint swelling, rash       ROS:  ROS negative except as per HPI.       PE:  Visit Vitals  BP 130/72 (BP 1 Location: Left arm, BP Patient Position: Sitting, BP Cuff Size: Adult)   Pulse 74   Temp 97.1 °F (36.2 °C) (Temporal)   Resp 18   Ht 5' 7\" (1.702 m)   Wt 289 lb 3.2 oz (131.2 kg)   SpO2 97%   BMI 45.30 kg/m²     Gen: alert, oriented, no acute distress  Head: normocephalic, atraumatic  Eyes: pupils equal round reactive to light, sclera clear, conjunctiva clear  Oral: masked  Neck: supple  Resp: no increase work of breathing, lungs clear to ausculation bilaterally, no wheezing, rales or rhonchi  CV: S1, S2 normal.  No murmurs, rubs, or gallops. Neuro: grossly intact  Skin: no lesion or rash  Extremities: no cyanosis or edema    No results found for this visit on 05/02/22. Results for orders placed or performed in visit on 04/25/22   TSH 3RD GENERATION   Result Value Ref Range    TSH 2.000 0.450 - 4.542 uIU/mL   METABOLIC PANEL, COMPREHENSIVE   Result Value Ref Range    Glucose 122 (H) 65 - 99 mg/dL    BUN 10 6 - 24 mg/dL    Creatinine 0.74 0.57 - 1.00 mg/dL    eGFR 97 >59 mL/min/1.73    BUN/Creatinine ratio 14 9 - 23    Sodium 138 134 - 144 mmol/L    Potassium 4.5 3.5 - 5.2 mmol/L    Chloride 100 96 - 106 mmol/L    CO2 23 20 - 29 mmol/L    Calcium 9.3 8.7 - 10.2 mg/dL    Protein, total 6.8 6.0 - 8.5 g/dL    Albumin 3.8 3.8 - 4.9 g/dL    GLOBULIN, TOTAL 3.0 1.5 - 4.5 g/dL    A-G Ratio 1.3 1.2 - 2.2    Bilirubin, total 0.5 0.0 - 1.2 mg/dL    Alk.  phosphatase 101 44 - 121 IU/L    AST (SGOT) 15 0 - 40 IU/L    ALT (SGPT) 21 0 - 32 IU/L   LIPID PANEL   Result Value Ref Range    Cholesterol, total 115 100 - 199 mg/dL    Triglyceride 56 0 - 149 mg/dL    HDL Cholesterol 37 (L) >39 mg/dL    VLDL, calculated 13 5 - 40 mg/dL    LDL, calculated 65 0 - 99 mg/dL   HEMOGLOBIN A1C WITH EAG   Result Value Ref Range    Hemoglobin A1c 7.3 (H) 4.8 - 5.6 %    Estimated average glucose 163 mg/dL   VITAMIN D, 25 HYDROXY   Result Value Ref Range    VITAMIN D, 25-HYDROXY 25.8 (L) 30.0 - 100.0 ng/mL Assessment/Plan:      ICD-10-CM ICD-9-CM    1. Uncontrolled type 2 diabetes mellitus with hyperglycemia (HCC)  E11.65 250.02    2. Essential hypertension  I10 401.9    3. Mixed hyperlipidemia  E78.2 272.2    4. Vitamin D deficiency  E55.9 268.9 ergocalciferol (ERGOCALCIFEROL) 1,250 mcg (50,000 unit) capsule       1. Diabetes  A1c now 7.3%, increased from 7.0% 3 months ago, however this is a great improvement from an A1c of 12.2% in July 2021   Current therapy glimepiride 4 mg every morning and metformin XR 1000 mg twice daily   She has had late night studying for grad school and eating junk food while a plate to keep her energy going, she has 8 more weeks to grad school left with a low stress project and is optimistic she can do better with her diet   Continue same therapy with increased focus on diet and recheck A1c in 3 months, if still in the sevens I recommend adjusting her medications, consider adding GLP-1   She completed DEP, is on ARB and statin    2. Hypertension is controlled on current regimen, continue same    3. Lipids are at goal on current statin, continue same    4. Vitamin D deficiency with low D levels of 25. She will start ergocalciferol for 3 months, then switch to D3 2000 units daily    Will check POC A1c at next visit    Health Maintenance reviewed - updated. Orders Placed This Encounter    ergocalciferol (ERGOCALCIFEROL) 1,250 mcg (50,000 unit) capsule     Sig: Take 1 Capsule by mouth every seven (7) days for 90 days. Dispense:  13 Capsule     Refill:  0       There are no discontinued medications. Current Outpatient Medications   Medication Sig Dispense Refill    ergocalciferol (ERGOCALCIFEROL) 1,250 mcg (50,000 unit) capsule Take 1 Capsule by mouth every seven (7) days for 90 days. 13 Capsule 0    glimepiride (AMARYL) 4 mg tablet TAKE 1 TABLET BY MOUTH EVERY MORNING. WITH FOOD/BREAKFAST.  90 Tablet 1    metoprolol succinate (TOPROL-XL) 50 mg XL tablet TAKE 1 TABLET BY MOUTH EVERY DAY AT NIGHT 90 Tablet 2    metFORMIN ER (GLUCOPHAGE XR) 500 mg tablet Take 2 Tablets by mouth two (2) times daily (with meals). 360 Tablet 1    olmesartan-hydroCHLOROthiazide (BENICAR HCT) 20-12.5 mg per tablet TAKE 1 TABLET BY MOUTH EVERY DAY 90 Tablet 1    atorvastatin (LIPITOR) 40 mg tablet TAKE 1 TABLET BY MOUTH EVERY DAY 90 Tablet 1    glucose blood VI test strips (ASCENSIA AUTODISC VI, ONE TOUCH ULTRA TEST VI) strip Check blood sugar once daily. Please dispense brand preferred by insurance. Dx: E11.65 100 Strip 3    Blood-Glucose Meter monitoring kit Use to monitor blood sugar. Please dispense the brand preferred by insurance. Dx: E11.65 1 Kit 0    lancets misc Use to check blood sugar once daily Dx: E11.65 100 Each 3    levonorgestreL (MIRENA) 20 mcg/24 hours (5 yrs) 52 mg IUD 1 Device by IntraUTERine route once. Recommended healthy diet low in carbohydrates, fats, sodium and cholesterol. Recommended regular cardiovascular exercise 3-6 times per week for 30-60 minutes daily. Verbal and written instructions (see AVS) provided. Patient expresses understanding of diagnosis and treatment plan. Follow-up and Dispositions    · Return in about 3 months (around 8/2/2022) for DM (POC A1C next visit).        Future Appointments   Date Time Provider Shereen Tam   7/21/2022  8:20 AM MD SHANDA Banuelos   8/4/2022  4:00 PM Orly Martinez, MANOJ HERNANDEZ AMB

## 2022-05-02 NOTE — PROGRESS NOTES
Yoana Newberry is a 45 y.o. female     Chief Complaint   Patient presents with    Diabetes     3M follow up       Visit Vitals  /72 (BP 1 Location: Left arm, BP Patient Position: Sitting, BP Cuff Size: Adult)   Pulse 74   Temp 97.1 °F (36.2 °C) (Temporal)   Resp 18   Ht 5' 7\" (1.702 m)   Wt 289 lb 3.2 oz (131.2 kg)   SpO2 97%   BMI 45.30 kg/m²       Health Maintenance Due   Topic Date Due    Pneumococcal 0-64 years (1 - PCV) Never done    Eye Exam Retinal or Dilated  Never done    DTaP/Tdap/Td series (1 - Tdap) Never done    Cervical cancer screen  Never done    Shingrix Vaccine Age 50> (1 of 2) Never done         1. \"Have you been to the ER, urgent care clinic since your last visit? Hospitalized since your last visit? \" No    2. \"Have you seen or consulted any other health care providers outside of the [address] [place] [address] since your last visit? \" No     3. For patients aged 39-70: Has the patient had a colonoscopy / FIT/ Cologuard? Yes - no Care Gap present      If the patient is female:    4. For patients aged 41-77: Has the patient had a mammogram within the past 2 years? Yes - no Care Gap present      5. For patients aged 21-65: Has the patient had a pap smear?  No

## 2022-05-02 NOTE — PATIENT INSTRUCTIONS
Your vitamin D is low. I will prescribe you weekly vitamin D called ergocalciferol 50,000 units; take this once weekly for 3 months. After you have completed the prescription for 3 months, then take Vitamin D3 2000 units once daily obtained over-the-counter and stay on this as a long-term daily supplement. We should recheck you levels in 6 months. Our body makes Vitamin D after exposure to sun. Having 20 minutes sun daily can help. Eat fortified dairy or fish. Do NOT eat the cereal or orange juice listed below since those are high in sugar which would worsen your diabetes. Learning About High-Vitamin D Foods  What foods are high in vitamin D? The foods you eat contain nutrients, such as vitamins and minerals. Vitamin D is a nutrient. Your body needs the right amount to stay healthy and work as it should. You can use the list below to help you make choices about which foods to eat. Here are some foods that contain vitamin D. Fruits  · Orange juice, fortified with vitamin D  Grains  · Cereals, fortified with vitamin D  Dairy and dairy alternatives  · Milk, fortified with vitamin D  · Non-dairy milk (almond, rice, soy), fortified with vitamin D  · Yogurt, fortified with vitamin D  Protein foods  · Flounder  · Mackerel  · [place]  · [place]  · Sole  · [place]  · [address] [facility]  · Cod liver oil  Work with your doctor to find out how much of this nutrient you need. Depending on your health, you may need more or less of it in your diet. Where can you learn more? Go to http://www.gray.com/  Enter V450 in the search box to learn more about \"Learning About High-Vitamin D Foods. \"  Current as of: September 8, 2021               Content Version: 13.2  © 7691-6013 [employer]. Care instructions adapted under license by [employer] (which disclaims liability or warranty for this information).  If you have questions about a medical condition or this instruction, always ask your healthcare professional. [place] [address] any warranty or liability for your use of this information.

## 2022-07-20 DIAGNOSIS — E78.2 MIXED HYPERLIPIDEMIA: ICD-10-CM

## 2022-07-20 DIAGNOSIS — I10 ESSENTIAL HYPERTENSION: ICD-10-CM

## 2022-07-20 RX ORDER — ATORVASTATIN CALCIUM 40 MG/1
TABLET, FILM COATED ORAL
Qty: 90 TABLET | Refills: 1 | Status: SHIPPED | OUTPATIENT
Start: 2022-07-20

## 2022-07-20 RX ORDER — OLMESARTAN MEDOXOMIL AND HYDROCHLOROTHIAZIDE 20/12.5 20; 12.5 MG/1; MG/1
TABLET ORAL
Qty: 90 TABLET | Refills: 1 | Status: SHIPPED | OUTPATIENT
Start: 2022-07-20 | End: 2022-10-24 | Stop reason: DRUGHIGH

## 2022-07-25 ENCOUNTER — OFFICE VISIT (OUTPATIENT)
Dept: CARDIOLOGY CLINIC | Age: 52
End: 2022-07-25
Payer: COMMERCIAL

## 2022-07-25 VITALS
OXYGEN SATURATION: 98 % | HEIGHT: 67 IN | HEART RATE: 90 BPM | WEIGHT: 288 LBS | DIASTOLIC BLOOD PRESSURE: 86 MMHG | BODY MASS INDEX: 45.2 KG/M2 | SYSTOLIC BLOOD PRESSURE: 136 MMHG | RESPIRATION RATE: 14 BRPM

## 2022-07-25 DIAGNOSIS — E78.2 MIXED HYPERLIPIDEMIA: ICD-10-CM

## 2022-07-25 DIAGNOSIS — I10 HTN (HYPERTENSION), BENIGN: ICD-10-CM

## 2022-07-25 DIAGNOSIS — R55 SYNCOPE, UNSPECIFIED SYNCOPE TYPE: Primary | ICD-10-CM

## 2022-07-25 PROCEDURE — 93000 ELECTROCARDIOGRAM COMPLETE: CPT | Performed by: NURSE PRACTITIONER

## 2022-07-25 PROCEDURE — 99213 OFFICE O/P EST LOW 20 MIN: CPT | Performed by: NURSE PRACTITIONER

## 2022-07-25 NOTE — PROGRESS NOTES
[id number] CARDIOLOGY  [facility]               OFFICE NOTE       Raulito Rosas   1970  [id number]    Date/Time:  7/25/2022  Cardiologist:  Shikha Dillard M.D.,F.A.C.C.      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 Syncope: She has not had any recurrent syncope recently. Hx of normal tilt table test and normal cardiac work-up and neurological work-up in the past.  Likely caused by intense stress from work - electrocardiogram today reveals normal sinus rhythm with no significant ST-T changes and minimal intraventricular conduction delay. She will follow up with Dr. Luis Alberto Harvey again in 1 year     2. Hypertension: Her blood pressure today seems to be reasonable. Continue olmesartan HCT and Toprol XL    3. Dyslipidemia: managed by PCP    4. DM Type 2: managed by PCP       HPI   45 y.o. female. 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 Ejection fraction was estimated to be 60 %. There were no regional  wall motion abnormalities. There was mild to moderate concentric  hypertrophy. Mitral valve: No echocardiographic evidence for prolapse.   Negative HUT on 5/18 but high bp correlating with symptoms bblockers started  EEG negative in 2018    CARDIAC STUDIES    EKG Results       Procedure 720 Value Units Date/Time    AMB POC EKG ROUTINE W/ 12 LEADS, INTER & REP [[id number]] Resulted: 07/25/22 1005    Order Status: Completed Updated: 07/25/22 1006          IMAGING      MRI Results (most recent):  Results from Abstract encounter on 05/29/18    MRI BRAIN WO CONT      CT Results (most recent):  Results from [facility] encounter on 12/22/11    CT SINUSES WITHOUT CONTRAST    Narrative  **Final Report**      ICD Codes / Adm. Diagnosis: 473.0   / CHRONIC MAXILLARY SINUSITIS  Examination:  CT SINUSES WO CON  - [id number] - Dec 22 2011  3:32PM  Accession No:  [id number]  Reason:  Chronic bilateral maxillary sinusitis      REPORT:  EXAM:  CT SINUS WITHOUT CONTRAST    INDICATION: Sinusitis. COMPARISON: None. CONTRAST: None. TECHNIQUE:  Multislice helical CT of the paranasal sinuses was performed in the axial  plane and sagittal and coronal reformations were generated. FINDINGS:  The frontal sinuses and frontoethmoidal recesses are clear. The patient is status post bilateral anterior ethmoidectomy. The patient is status post bilateral maxillary antrectomy. The maxillary  sinuses are clear. The posterior ethmoid air cells and sphenoid sinus are clear. The nasal cavity is clear. The nasal septum is midline. There is no bone destruction or bone dehiscence of the paranasal sinuses. IMPRESSION: Status post bilateral anterior ethmoidectomy and maxillary  antrectomy (FESS). Clear paranasal sinuses. Signing/Reading Doctor: Shavonne Wong ([id number])  Approved: Shavonne Wong ([id number])  12/23/2011      XR Results (most recent):  No results found for this or any previous visit.       Past Medical History:   Diagnosis Date    Altered mental status, unspecified 3/2/2018    Breast lesion on mammography 07/18/2021    right breast, benign node unchanged on 6 month follow up    Diabetes ([facility] [address]     Heart murmur     Hypertension     Ill-defined condition     pseudo seizure    Syncope and collapse 5/21/2018     Past Surgical History:   Procedure Laterality Date    HX GYN \"cyst removal\"    HX SEPTOPLASTY      HX TONSIL AND ADENOIDECTOMY      TILT TABLE EVAL W/WO DRUG  5/21/2018          Social History     Tobacco Use    Smoking status: Never    Smokeless tobacco: Never   Vaping Use    Vaping Use: Never used   Substance Use Topics    Alcohol use: No    Drug use: No     No family history on file. Allergies   Allergen Reactions    Decadron [Dexamethasone] Other (comments)     Joint swelling, rash         Visit Vitals  /86 (BP 1 Location: Left arm, BP Patient Position: Sitting, BP Cuff Size: Adult)   Pulse 90   Resp 14   Ht 5' 7\" (1.702 m)   Wt 288 lb (130.6 kg)   SpO2 98%   BMI 45.11 kg/m²         Last 3 Recorded Weights in this Encounter    07/25/22 1002   Weight: 288 lb (130.6 kg)            Review of Systems:   Pertinent items are noted in the History of Present Illness. Visit Vitals  /86 (BP 1 Location: Left arm, BP Patient Position: Sitting, BP Cuff Size: Adult)   Pulse 90   Resp 14   Ht 5' 7\" (1.702 m)   Wt 288 lb (130.6 kg)   SpO2 98%   BMI 45.11 kg/m²     General Appearance:  Well developed, well nourished,alert and oriented x 3, and individual in no acute distress. Ears/Nose/Mouth/Throat:   Hearing grossly normal.         Neck: Supple. Chest:   Lungs clear to auscultation bilaterally. Cardiovascular:  Regular rate and rhythm, S1, S2 normal, no murmur. Abdomen:   Soft, non-tender, bowel sounds are active. Extremities: No edema bilaterally. Skin: Warm and dry. 12 lead ECG: NSR    Current Outpatient Medications on File Prior to Visit   Medication Sig Dispense Refill    olmesartan-hydroCHLOROthiazide (BENICAR HCT) 20-12.5 mg per tablet TAKE 1 TABLET BY MOUTH EVERY DAY 90 Tablet 1    atorvastatin (LIPITOR) 40 mg tablet TAKE 1 TABLET BY MOUTH EVERY DAY 90 Tablet 1    ergocalciferol (ERGOCALCIFEROL) 1,250 mcg (50,000 unit) capsule Take 1 Capsule by mouth every seven (7) days for 90 days.  13 Capsule 0    glimepiride (AMARYL) 4 mg tablet TAKE 1 TABLET BY MOUTH EVERY MORNING. WITH FOOD/BREAKFAST. 90 Tablet 1    metoprolol succinate (TOPROL-XL) 50 mg XL tablet TAKE 1 TABLET BY MOUTH EVERY DAY AT NIGHT 90 Tablet 2    metFORMIN ER (GLUCOPHAGE XR) 500 mg tablet Take 2 Tablets by mouth two (2) times daily (with meals). 360 Tablet 1    glucose blood VI test strips (ASCENSIA AUTODISC VI, ONE TOUCH ULTRA TEST VI) strip Check blood sugar once daily. Please dispense brand preferred by insurance. Dx: E11.65 100 Strip 3    Blood-Glucose Meter monitoring kit Use to monitor blood sugar. Please dispense the brand preferred by insurance. Dx: E11.65 1 Kit 0    lancets misc Use to check blood sugar once daily Dx: E11.65 100 Each 3    levonorgestreL (MIRENA) 20 mcg/24 hours (5 yrs) 52 mg IUD 1 Device by IntraUTERine route once. No current facility-administered medications on file prior to visit. Sindy BENIGNO Ward had no medications administered during this visit. Current Outpatient Medications   Medication Sig    olmesartan-hydroCHLOROthiazide (BENICAR HCT) 20-12.5 mg per tablet TAKE 1 TABLET BY MOUTH EVERY DAY    atorvastatin (LIPITOR) 40 mg tablet TAKE 1 TABLET BY MOUTH EVERY DAY    ergocalciferol (ERGOCALCIFEROL) 1,250 mcg (50,000 unit) capsule Take 1 Capsule by mouth every seven (7) days for 90 days. glimepiride (AMARYL) 4 mg tablet TAKE 1 TABLET BY MOUTH EVERY MORNING. WITH FOOD/BREAKFAST.    metoprolol succinate (TOPROL-XL) 50 mg XL tablet TAKE 1 TABLET BY MOUTH EVERY DAY AT NIGHT    metFORMIN ER (GLUCOPHAGE XR) 500 mg tablet Take 2 Tablets by mouth two (2) times daily (with meals). glucose blood VI test strips (ASCENSIA AUTODISC VI, ONE TOUCH ULTRA TEST VI) strip Check blood sugar once daily. Please dispense brand preferred by insurance. Dx: E11.65    Blood-Glucose Meter monitoring kit Use to monitor blood sugar. Please dispense the brand preferred by insurance.  Dx: E11.65    lancets misc Use to check blood sugar once daily Dx: E11.65 levonorgestreL (MIRENA) 20 mcg/24 hours (5 yrs) 52 mg IUD 1 Device by IntraUTERine route once. No current facility-administered medications for this visit. Lab Results   Component Value Date/Time    Cholesterol, total 115 04/25/2022 12:00 AM    HDL Cholesterol 37 (L) 04/25/2022 12:00 AM    LDL, calculated 65 04/25/2022 12:00 AM    LDL, calculated 83 01/27/2020 11:34 AM    VLDL, calculated 13 04/25/2022 12:00 AM    VLDL, calculated 15 01/27/2020 11:34 AM    Triglyceride 56 04/25/2022 12:00 AM       Lab Results   Component Value Date/Time    Sodium 138 04/25/2022 12:00 AM    Potassium 4.5 04/25/2022 12:00 AM    Chloride 100 04/25/2022 12:00 AM    CO2 23 04/25/2022 12:00 AM    Anion gap 6 02/23/2018 01:00 PM    Glucose 122 (H) 04/25/2022 12:00 AM    BUN 10 04/25/2022 12:00 AM    Creatinine 0.74 04/25/2022 12:00 AM    BUN/Creatinine ratio 14 04/25/2022 12:00 AM    GFR est  10/11/2021 08:06 AM    GFR est non-AA 90 10/11/2021 08:06 AM    Calcium 9.3 04/25/2022 12:00 AM       Lab Results   Component Value Date/Time    ALT (SGPT) 21 04/25/2022 12:00 AM    Alk.  phosphatase 101 04/25/2022 12:00 AM    Bilirubin, total 0.5 04/25/2022 12:00 AM       Lab Results   Component Value Date/Time    WBC 7.6 07/16/2021 12:00 AM    Hemoglobin, POC 12.9 03/15/2019 10:03 AM    HGB 13.2 07/16/2021 12:00 AM    Hematocrit, POC 38 03/15/2019 10:03 AM    HCT 41.3 07/16/2021 12:00 AM    PLATELET 179 22/18/3490 12:00 AM    MCV 82 07/16/2021 12:00 AM       Lab Results   Component Value Date/Time    TSH 2.000 04/25/2022 12:00 AM         Lab Results   Component Value Date/Time    Cholesterol, total 115 04/25/2022 12:00 AM    Cholesterol, total 117 10/11/2021 08:06 AM    Cholesterol, total 207 (H) 07/16/2021 12:00 AM    Cholesterol, total 141 01/27/2020 11:34 AM    HDL Cholesterol 37 (L) 04/25/2022 12:00 AM    HDL Cholesterol 36 (L) 10/11/2021 08:06 AM    HDL Cholesterol 38 (L) 07/16/2021 12:00 AM    HDL Cholesterol 43 01/27/2020 11:34 AM    LDL, calculated 65 04/25/2022 12:00 AM    LDL, calculated 67 10/11/2021 08:06 AM    LDL, calculated 148 (H) 07/16/2021 12:00 AM    LDL, calculated 83 01/27/2020 11:34 AM    Triglyceride 56 04/25/2022 12:00 AM    Triglyceride 65 10/11/2021 08:06 AM    Triglyceride 114 07/16/2021 12:00 AM    Triglyceride 76 01/27/2020 11:34 AM          Clement Martel, KASSANDRAP, [address]  [facility] [address] , [address] [facility] [address]  [address]  (B) [phone] (XPS) [phone]

## 2022-08-02 DIAGNOSIS — E11.65 UNCONTROLLED TYPE 2 DIABETES MELLITUS WITH HYPERGLYCEMIA (HCC): ICD-10-CM

## 2022-08-03 RX ORDER — METFORMIN HYDROCHLORIDE 500 MG/1
1000 TABLET, EXTENDED RELEASE ORAL 2 TIMES DAILY WITH MEALS
Qty: 360 TABLET | Refills: 1 | Status: SHIPPED | OUTPATIENT
Start: 2022-08-03

## 2022-08-04 ENCOUNTER — OFFICE VISIT (OUTPATIENT)
Dept: INTERNAL MEDICINE CLINIC | Age: 52
End: 2022-08-04
Payer: COMMERCIAL

## 2022-08-04 VITALS
RESPIRATION RATE: 14 BRPM | OXYGEN SATURATION: 98 % | DIASTOLIC BLOOD PRESSURE: 83 MMHG | TEMPERATURE: 97.1 F | SYSTOLIC BLOOD PRESSURE: 127 MMHG | BODY MASS INDEX: 45.22 KG/M2 | HEART RATE: 90 BPM | HEIGHT: 67 IN | WEIGHT: 288.1 LBS

## 2022-08-04 DIAGNOSIS — E78.2 MIXED HYPERLIPIDEMIA: ICD-10-CM

## 2022-08-04 DIAGNOSIS — I10 ESSENTIAL HYPERTENSION: ICD-10-CM

## 2022-08-04 DIAGNOSIS — E11.65 UNCONTROLLED TYPE 2 DIABETES MELLITUS WITH HYPERGLYCEMIA (HCC): Primary | ICD-10-CM

## 2022-08-04 PROCEDURE — 99213 OFFICE O/P EST LOW 20 MIN: CPT | Performed by: PHYSICIAN ASSISTANT

## 2022-08-04 PROCEDURE — 3051F HG A1C>EQUAL 7.0%<8.0%: CPT | Performed by: PHYSICIAN ASSISTANT

## 2022-08-04 NOTE — PROGRESS NOTES
Chief Complaint   Patient presents with    Follow-up   Visit Vitals  /83 (BP 1 Location: Left upper arm, BP Patient Position: Sitting, BP Cuff Size: Large adult)   Pulse 90   Temp 97.1 °F (36.2 °C) (Oral)   Resp 14   Ht 5' 7\" (1.702 m)   Wt 288 lb 1.6 oz (130.7 kg)   SpO2 98%   BMI 45.12 kg/m²         1. \"Have you been to the ER, urgent care clinic since your last visit? Hospitalized since your last visit? \" No    2. \"Have you seen or consulted any other health care providers outside of the [address] [place] [address] since your last visit? \" No     3. For patients aged 39-70: Has the patient had a colonoscopy / FIT/ Cologuard? No      If the patient is female:    4. For patients aged 41-77: Has the patient had a mammogram within the past 2 years? No      5. For patients aged 21-65: Has the patient had a pap smear?  [address] [place] [address] 7/25/22 will request records

## 2022-08-04 NOTE — PROGRESS NOTES
HPI:  47 y.o.  presents for follow up appointment. No hospital, ER or specialist visits since last primary care visit except as noted below. Last visit 5/2/22    DM -   Most recent A1C 7.3% on 4/25/22,  7.0% 1/31/22,  previously 8.3% on 10/11/21;  A1C 11.1% on 8/16/2021, and 12.2% on 7/2021  -current therapy: glimepiride 4 mg qAM (increased from 2 mg 8/16/21), and metformin XR 1000 mg BID  -on ARB and atorvastatin  -she is walking more and drinking more water  -she has stopped shopping on the chips and cookie aisle of the grocery store  -she has completed DEP in 10/2021  (-)microalbumin 7/2021  Eye exam 7/2021, record requested at visit 8/2021    At last visit, I stressed the importance of diabetic diet and lifestyle to help bring the A1C down from 7.3%. 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 (but she reports since [facility] are short-staffed by over 200 positions, so she will have extra work load)    For the last week, she has not eaten past 7pm and has gotten on a more normal schedule (previously was having junk food at midnight and only sleeping 4 hrs)  -she is getting 8 hrs sleep now  -no recent home blood sugar monitoring       Dyslipidemia - on atorvastatin 40 mg  LDL 65 on 4/25/22; 67 on 10/11/21, previously 148 on 7/2021        Hypertension - on metoprolol XL 50 mg QHS (lowered from BID by Dr Yvette Kelley 7/2021, started by cardiology for elevated BP, MVP and LVH), and olmesartan/HCTZ 20/12.5 daily; compliant with medication, no home monitoring. No history of heart disease or stroke. No chest pain, no shortness of breath, no headaches, no lower extremity swelling. H/O syncopal episode in 2018  Had annual follow up with cardiology 7/25/22, note reviewed, Hunter Mahmood NP  No changes, F/U in 1 year    Patient Active Problem List    Diagnosis    Vitamin D deficiency    Controlled type 2 diabetes mellitus without complication, without long-term current use of insulin ([address]    Breast lesion on mammography    Mixed hyperlipidemia    Class 3 severe obesity with serious comorbidity and body mass index (BMI) of 40.0 to 44.9 in adult [facility])    Hypertension    Pseudoseizure         Past Medical History:   Diagnosis Date    Altered mental status, unspecified 3/2/2018    Breast lesion on mammography 07/18/2021    right breast, benign node unchanged on 6 month follow up    Diabetes ([address]     Heart murmur     Hypertension     Ill-defined condition     pseudo seizure    Syncope and collapse 5/21/2018       Social History     Tobacco Use    Smoking status: Never    Smokeless tobacco: Never   Vaping Use    Vaping Use: Never used   Substance Use Topics    Alcohol use: No    Drug use: No       Outpatient Medications Marked as Taking for the 8/4/22 encounter (Office Visit) with Orly Martinez PA-C   Medication Sig Dispense Refill    metFORMIN ER (GLUCOPHAGE XR) 500 mg tablet TAKE 2 TABLETS BY MOUTH TWO (2) TIMES DAILY (WITH MEALS). 360 Tablet 1    olmesartan-hydroCHLOROthiazide (BENICAR HCT) 20-12.5 mg per tablet TAKE 1 TABLET BY MOUTH EVERY DAY 90 Tablet 1    atorvastatin (LIPITOR) 40 mg tablet TAKE 1 TABLET BY MOUTH EVERY DAY 90 Tablet 1    glimepiride (AMARYL) 4 mg tablet TAKE 1 TABLET BY MOUTH EVERY MORNING. WITH FOOD/BREAKFAST.  90 Tablet 1    metoprolol succinate (TOPROL-XL) 50 mg XL tablet TAKE 1 TABLET BY MOUTH EVERY DAY AT NIGHT 90 Tablet 2 glucose blood VI test strips (ASCENSIA AUTODISC VI, ONE TOUCH ULTRA TEST VI) strip Check blood sugar once daily. Please dispense brand preferred by insurance. Dx: E11.65 100 Strip 3    Blood-Glucose Meter monitoring kit Use to monitor blood sugar. Please dispense the brand preferred by insurance. Dx: E11.65 1 Kit 0    lancets misc Use to check blood sugar once daily Dx: E11.65 100 Each 3    levonorgestreL (MIRENA) 20 mcg/24 hours (5 yrs) 52 mg IUD 1 Device by IntraUTERine route once. Allergies   Allergen Reactions    Decadron [Dexamethasone] Other (comments)     Joint swelling, rash       ROS:  ROS negative except as per HPI. PE:  Visit Vitals  /83 (BP 1 Location: Left upper arm, BP Patient Position: Sitting, BP Cuff Size: Large adult)   Pulse 90   Temp 97.1 °F (36.2 °C) (Oral)   Resp 14   Ht 5' 7\" (1.702 m)   Wt 288 lb 1.6 oz (130.7 kg)   SpO2 98%   BMI 45.12 kg/m²     Gen: alert, oriented, no acute distress  Head: normocephalic, atraumatic  Eyes: pupils equal round reactive to light, sclera clear, conjunctiva clear  Oral: masked  Neck: supple  Resp: no increase work of breathing, lungs clear to ausculation bilaterally, no wheezing, rales or rhonchi  CV: S1, S2 normal.  No murmurs, rubs, or gallops. Abd: soft, not tender, not distended. Normal bowel sounds. Neuro: grossly intact  Skin: no lesion or rash  Extremities: no cyanosis or edema    No results found for this visit on 08/04/22.     Results for orders placed or performed in visit on 04/25/22   TSH 3RD GENERATION   Result Value Ref Range    TSH 2.000 0.450 - 3.828 uIU/mL   METABOLIC PANEL, COMPREHENSIVE   Result Value Ref Range    Glucose 122 (H) 65 - 99 mg/dL    BUN 10 6 - 24 mg/dL    Creatinine 0.74 0.57 - 1.00 mg/dL    eGFR 97 >59 mL/min/1.73    BUN/Creatinine ratio 14 9 - 23    Sodium 138 134 - 144 mmol/L    Potassium 4.5 3.5 - 5.2 mmol/L    Chloride 100 96 - 106 mmol/L    CO2 23 20 - 29 mmol/L    Calcium 9.3 8.7 - 10.2 mg/dL Protein, total 6.8 6.0 - 8.5 g/dL    Albumin 3.8 3.8 - 4.9 g/dL    GLOBULIN, TOTAL 3.0 1.5 - 4.5 g/dL    A-G Ratio 1.3 1.2 - 2.2    Bilirubin, total 0.5 0.0 - 1.2 mg/dL    Alk. phosphatase 101 44 - 121 IU/L    AST (SGOT) 15 0 - 40 IU/L    ALT (SGPT) 21 0 - 32 IU/L   LIPID PANEL   Result Value Ref Range    Cholesterol, total 115 100 - 199 mg/dL    Triglyceride 56 0 - 149 mg/dL    HDL Cholesterol 37 (L) >39 mg/dL    VLDL, calculated 13 5 - 40 mg/dL    LDL, calculated 65 0 - 99 mg/dL   HEMOGLOBIN A1C WITH EAG   Result Value Ref Range    Hemoglobin A1c 7.3 (H) 4.8 - 5.6 %    Estimated average glucose 163 mg/dL   VITAMIN D, 25 HYDROXY   Result Value Ref Range    VITAMIN D, 25-HYDROXY 25.8 (L) 30.0 - 100.0 ng/mL         Assessment/Plan:      ICD-10-CM ICD-9-CM    1. Uncontrolled type 2 diabetes mellitus with hyperglycemia (HCC)  E11.65 250.02 HEMOGLOBIN A1C WITH EAG      METABOLIC PANEL, BASIC      METABOLIC PANEL, BASIC      HEMOGLOBIN A1C WITH EAG      2. Essential hypertension  I10 401.9       3. Mixed hyperlipidemia  E78.2 272.2             1.  Diabetes  Most recent A1c 7.3% on 4/25/2022  Current therapy: Glimepiride 4 mg every morning and metformin XR 1000 mg twice daily  She has been under a lot of stress with poor eating habits and lack of sleep for the last 18 months. We discussed at last visit the importance of diet hepatic diet, lifestyle changes, adequate sleep and the role it plays in her blood sugar. She just completed her masters program last week, so she is only been on lifestyle changes for the last week. Unable to check POC A1c today since the machine is not calibrated, so we will send out labs and make further recommendations. Am optimistic on her lifestyle changes moving forward from today that as long as her A1c is not drastically elevated from prior reading we may continue current meds. She understands and we will get in touch with results    2.   Hypertension  Controlled on current metoprolol XL 50 mg and olmesartan/HCTZ 20/12.5 mg daily, continue same    3. Dyslipidemia  Controlled on atorvastatin 40 mg with LDL at goal, continue same        Health Maintenance reviewed - updated. Orders Placed This Encounter    HEMOGLOBIN A1C WITH EAG     Standing Status:   Future     Number of Occurrences:   1     Standing Expiration Date:   1/1/0941    METABOLIC PANEL, BASIC     Standing Status:   Future     Number of Occurrences:   1     Standing Expiration Date:   8/4/2023       There are no discontinued medications. Current Outpatient Medications   Medication Sig Dispense Refill    metFORMIN ER (GLUCOPHAGE XR) 500 mg tablet TAKE 2 TABLETS BY MOUTH TWO (2) TIMES DAILY (WITH MEALS). 360 Tablet 1    olmesartan-hydroCHLOROthiazide (BENICAR HCT) 20-12.5 mg per tablet TAKE 1 TABLET BY MOUTH EVERY DAY 90 Tablet 1    atorvastatin (LIPITOR) 40 mg tablet TAKE 1 TABLET BY MOUTH EVERY DAY 90 Tablet 1    glimepiride (AMARYL) 4 mg tablet TAKE 1 TABLET BY MOUTH EVERY MORNING. WITH FOOD/BREAKFAST. 90 Tablet 1    metoprolol succinate (TOPROL-XL) 50 mg XL tablet TAKE 1 TABLET BY MOUTH EVERY DAY AT NIGHT 90 Tablet 2    glucose blood VI test strips (ASCENSIA AUTODISC VI, ONE TOUCH ULTRA TEST VI) strip Check blood sugar once daily. Please dispense brand preferred by insurance. Dx: E11.65 100 Strip 3    Blood-Glucose Meter monitoring kit Use to monitor blood sugar. Please dispense the brand preferred by insurance. Dx: E11.65 1 Kit 0    lancets misc Use to check blood sugar once daily Dx: E11.65 100 Each 3    levonorgestreL (MIRENA) 20 mcg/24 hours (5 yrs) 52 mg IUD 1 Device by IntraUTERine route once. Recommended healthy diet low in carbohydrates, fats, sodium and cholesterol. Recommended regular cardiovascular exercise 3-6 times per week for 30-60 minutes daily. Verbal and written instructions (see AVS) provided. Patient expresses understanding of diagnosis and treatment plan.       Follow-up and Dispositions Return in about 3 months (around 11/4/2022) for DM.        Future Appointments   Date Time Provider Shereen Tam   11/7/2022 11:00 AM Orly Martinez PA-C [facility] AMB   7/25/2023 10:00 AM MD SHANDA Jensen BS AMB

## 2022-08-05 LAB
BUN SERPL-MCNC: 18 MG/DL (ref 6–24)
BUN/CREAT SERPL: 25 (ref 9–23)
CALCIUM SERPL-MCNC: 10.2 MG/DL (ref 8.7–10.2)
CHLORIDE SERPL-SCNC: 103 MMOL/L (ref 96–106)
CO2 SERPL-SCNC: 20 MMOL/L (ref 20–29)
CREAT SERPL-MCNC: 0.73 MG/DL (ref 0.57–1)
EGFR: 98 ML/MIN/1.73
EST. AVERAGE GLUCOSE BLD GHB EST-MCNC: 177 MG/DL
GLUCOSE SERPL-MCNC: 71 MG/DL (ref 65–99)
HBA1C MFR BLD: 7.8 % (ref 4.8–5.6)
POTASSIUM SERPL-SCNC: 3.9 MMOL/L (ref 3.5–5.2)
SODIUM SERPL-SCNC: 141 MMOL/L (ref 134–144)

## 2022-09-02 ENCOUNTER — TRANSCRIBE ORDER (OUTPATIENT)
Dept: SCHEDULING | Age: 52
End: 2022-09-02

## 2022-09-02 DIAGNOSIS — Z12.31 VISIT FOR SCREENING MAMMOGRAM: Primary | ICD-10-CM

## 2022-09-23 NOTE — PROGRESS NOTES
Please call to inform her A1C is going back up and is now 7.8%. I recommend starting an additional medication, and would like to review all of the details in an office visit. Please see if she can come in sooner than her 11/7/22 appt.

## 2022-09-28 ENCOUNTER — TELEPHONE (OUTPATIENT)
Dept: INTERNAL MEDICINE CLINIC | Age: 52
End: 2022-09-28

## 2022-09-28 NOTE — TELEPHONE ENCOUNTER
----- Message from Elaine Licona PA-C sent at 9/23/2022  1:26 PM EDT -----  Please call to inform her A1C is going back up and is now 7.8%. I recommend starting an additional medication, and would like to review all of the details in an office visit. Please see if she can come in sooner than her 11/7/22 appt.

## 2022-09-29 NOTE — PROGRESS NOTES
Called spoke to pt.   Advised  patient of lab results per Joselyn CORTEZ  Next appt is oct 10th @ 8 am

## 2022-10-06 ENCOUNTER — OFFICE VISIT (OUTPATIENT)
Dept: INTERNAL MEDICINE CLINIC | Age: 52
End: 2022-10-06
Payer: COMMERCIAL

## 2022-10-06 VITALS
SYSTOLIC BLOOD PRESSURE: 136 MMHG | TEMPERATURE: 98.6 F | RESPIRATION RATE: 16 BRPM | HEART RATE: 83 BPM | BODY MASS INDEX: 45.72 KG/M2 | OXYGEN SATURATION: 95 % | DIASTOLIC BLOOD PRESSURE: 82 MMHG | HEIGHT: 67 IN | WEIGHT: 291.3 LBS

## 2022-10-06 DIAGNOSIS — I10 ESSENTIAL HYPERTENSION: ICD-10-CM

## 2022-10-06 DIAGNOSIS — E11.65 UNCONTROLLED TYPE 2 DIABETES MELLITUS WITH HYPERGLYCEMIA (HCC): Primary | ICD-10-CM

## 2022-10-06 PROCEDURE — 3051F HG A1C>EQUAL 7.0%<8.0%: CPT | Performed by: PHYSICIAN ASSISTANT

## 2022-10-06 PROCEDURE — 99213 OFFICE O/P EST LOW 20 MIN: CPT | Performed by: PHYSICIAN ASSISTANT

## 2022-10-06 NOTE — PATIENT INSTRUCTIONS
Start the Rybelsus 3 mg tablet once daily, take it 30 minutes before breakfast.    Lower your glimepiride to 2 mg (so take 1/2 tablet of your 4 mg pill), and take this with your same metformin when you eat breakfast. (Keep the metformin the same at 2 pills twice a day with breakfast and dinner)

## 2022-10-06 NOTE — PROGRESS NOTES
HIPAA verified by two patient identifiers. Enoc Thao is a 47 y.o. female    Chief Complaint   Patient presents with    Diabetes     3 months       Visit Vitals  BP (!) 152/88 (BP 1 Location: Left upper arm, BP Patient Position: Sitting, BP Cuff Size: Adult long)   Pulse 83   Temp 98.6 °F (37 °C) (Oral)   Resp 16   Ht 5' 7\" (1.702 m)   Wt 291 lb 4.8 oz (132.1 kg)   SpO2 95%   BMI 45.62 kg/m²       Pain Scale: 0 - No pain/10  Pain Location:       Health Maintenance Due   Topic Date Due    Pneumococcal 0-64 years (1 - PCV) Never done    Eye Exam Retinal or Dilated  Never done    DTaP/Tdap/Td series (1 - Tdap) Never done    Cervical cancer screen  Never done    MICROALBUMIN Q1  07/16/2022    Foot Exam Q1  08/16/2022         Coordination of Care Questionnaire:  :   1) Have you been to an emergency room, urgent care, or hospitalized since your last visit? If yes, where when, and reason for visit? no       2. Have seen or consulted any other health care provider since your last visit? If yes, where when, and reason for visit? NO      Patient is accompanied by self I have received verbal consent from Enoc Thao to discuss any/all medical information while they are present in the room.

## 2022-10-06 NOTE — PROGRESS NOTES
HPI:  47 y.o.  presents for follow up appointment. No hospital, ER or specialist visits since last primary care visit except as noted below. Last visit 8/4/22    Most recent A1C 7.8% on 8/4/22   Previously 7.3% on 4/25/22,  7.0% 1/31/22,  previously 8.3% on 10/11/21;  A1C 11.1% on 8/16/2021, and 12.2% on 7/2021  TODAY 10/6/22 her fasting BS have been in the 170s since last visit, she has been stressed and snacking more recently  -current therapy: glimepiride 4 mg qAM (increased from 2 mg 8/16/21), and metformin XR 1000 mg BID  -on ARB and atorvastatin  -she is walking more and drinking more water  -she has stopped shopping on the chips and cookie aisle of the grocery store  -she has completed DEP in 10/2021  (-)microalbumin 7/2021  Eye exam 7/2021, record requested at visit 8/2021     Visit 5/2/22-I stressed the importance of diabetic diet and lifestyle to help bring the A1C down from 7.3%. 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 Hypertension - on metoprolol XL 50 mg QHS (lowered from BID by Dr Trace Chua 7/2021, started by cardiology for elevated BP, MVP and LVH), and olmesartan/HCTZ 20/12.5 daily; compliant with medication, no home monitoring. No history of heart disease or stroke. No chest pain, no shortness of breath, no headaches, no lower extremity swelling.     Patient Active Problem List    Diagnosis    Vitamin D deficiency    Controlled type 2 diabetes mellitus without complication, without long-term current use of insulin ([address]    Breast lesion on mammography    Mixed hyperlipidemia    Class 3 severe obesity with serious comorbidity and body mass index (BMI) of 40.0 to 44.9 in adult [facility])    Hypertension    Pseudoseizure         Past Medical History:   Diagnosis Date    Altered mental status, unspecified 3/2/2018    Breast lesion on mammography 07/18/2021    right breast, benign node unchanged on 6 month follow up    Diabetes ([facility] [address]     Heart murmur     Hypertension     Ill-defined condition     pseudo seizure    Syncope and collapse 5/21/2018       Social History     Tobacco Use    Smoking status: Never    Smokeless tobacco: Never   Vaping Use    Vaping Use: Never used   Substance Use Topics    Alcohol use: No    Drug use: No       Outpatient Medications Marked as Taking for the 10/6/22 encounter (Office Visit) with Orly Martinez PA-C   Medication Sig Dispense Refill    metFORMIN ER (GLUCOPHAGE XR) 500 mg tablet TAKE 2 TABLETS BY MOUTH TWO (2) TIMES DAILY (WITH MEALS). 360 Tablet 1    olmesartan-hydroCHLOROthiazide (BENICAR HCT) 20-12.5 mg per tablet TAKE 1 TABLET BY MOUTH EVERY DAY 90 Tablet 1    atorvastatin (LIPITOR) 40 mg tablet TAKE 1 TABLET BY MOUTH EVERY DAY 90 Tablet 1    glimepiride (AMARYL) 4 mg tablet TAKE 1 TABLET BY MOUTH EVERY MORNING. WITH FOOD/BREAKFAST. 90 Tablet 1    metoprolol succinate (TOPROL-XL) 50 mg XL tablet TAKE 1 TABLET BY MOUTH EVERY DAY AT NIGHT 90 Tablet 2    glucose blood VI test strips (ASCENSIA AUTODISC VI, ONE TOUCH ULTRA TEST VI) strip Check blood sugar once daily. Please dispense brand preferred by insurance. Dx: E11.65 100 Strip 3    Blood-Glucose Meter monitoring kit Use to monitor blood sugar. Please dispense the brand preferred by insurance. Dx: E11.65 1 Kit 0    lancets misc Use to check blood sugar once daily Dx: E11.65 100 Each 3    levonorgestreL (MIRENA) 20 mcg/24 hours (5 yrs) 52 mg IUD 1 Device by IntraUTERine route once. Allergies   Allergen Reactions    Decadron [Dexamethasone] Other (comments)     Joint swelling, rash       ROS:  ROS negative except as per HPI.       PE:  Visit Vitals  /82 (BP 1 Location: Left arm, BP Patient Position: Sitting, BP Cuff Size: Large adult)   Pulse 83   Temp 98.6 °F (37 °C) (Oral)   Resp 16   Ht 5' 7\" (1.702 m)   Wt 291 lb 4.8 oz (132.1 kg) SpO2 95%   BMI 45.62 kg/m²     Gen: alert, oriented, no acute distress  Head: normocephalic, atraumatic  Eyes: pupils equal round reactive to light, sclera clear, conjunctiva clear  Oral: masked  Neck: supple  Resp: no increase work of breathing     Neuro: grossly intact  Skin: no lesion or rash  Extremities: no cyanosis or edema    No results found for this visit on 10/06/22. Results for orders placed or performed in visit on 50/70/07   METABOLIC PANEL, BASIC   Result Value Ref Range    Glucose 71 65 - 99 mg/dL    BUN 18 6 - 24 mg/dL    Creatinine 0.73 0.57 - 1.00 mg/dL    eGFR 98 >59 mL/min/1.73    BUN/Creatinine ratio 25 (H) 9 - 23    Sodium 141 134 - 144 mmol/L    Potassium 3.9 3.5 - 5.2 mmol/L    Chloride 103 96 - 106 mmol/L    CO2 20 20 - 29 mmol/L    Calcium 10.2 8.7 - 10.2 mg/dL   HEMOGLOBIN A1C WITH EAG   Result Value Ref Range    Hemoglobin A1c 7.8 (H) 4.8 - 5.6 %    Estimated average glucose 177 mg/dL         Assessment/Plan:      ICD-10-CM ICD-9-CM    1. Uncontrolled type 2 diabetes mellitus with hyperglycemia (HCC)  E11.65 250.02 semaglutide (Rybelsus) 3 mg tablet      HEMOGLOBIN A1C WITH EAG      METABOLIC PANEL, BASIC      METABOLIC PANEL, BASIC      HEMOGLOBIN A1C WITH EAG      2. Essential hypertension  I10 401.9         1.   Uncontrolled diabetes  A1c 7.8% on 8/4/2022  Current therapy: Glimepiride 4 mg every morning and metformin XR 1000 mg twice daily  - We reviewed how her A1c has been rising and rationale for adding additional therapy  I reviewed Rybelsus and Ozempic and she prefers pill form  Wrist benefits side effects of medication reviewed  No family history of thyroid cancer  She will start Rybelsus 3 mg 30 minutes before breakfast  She will lower glimepiride to 2 mg and take this with breakfast  Continue same metformin  Follow-up in the next 3 to 4 weeks to see how she is doing and likely plan for increasing to 7 mg dosage  If Rybelsus is not covered by her insurance she is willing to do the Ozempic    2. Hypertension  Blood pressure initially elevated on check-in but normal on recheck  Controlled on current therapy, continue same        Health Maintenance reviewed - updated. Orders Placed This Encounter    HEMOGLOBIN A1C WITH EAG     Standing Status:   Future     Number of Occurrences:   1     Standing Expiration Date:   00/6/9364    METABOLIC PANEL, BASIC     Standing Status:   Future     Number of Occurrences:   1     Standing Expiration Date:   10/6/2023    semaglutide (Rybelsus) 3 mg tablet     Sig: Take 1 Tablet by mouth Daily (before breakfast). Dispense:  30 Tablet     Refill:  1       There are no discontinued medications. Current Outpatient Medications   Medication Sig Dispense Refill    semaglutide (Rybelsus) 3 mg tablet Take 1 Tablet by mouth Daily (before breakfast). 30 Tablet 1    metFORMIN ER (GLUCOPHAGE XR) 500 mg tablet TAKE 2 TABLETS BY MOUTH TWO (2) TIMES DAILY (WITH MEALS). 360 Tablet 1    olmesartan-hydroCHLOROthiazide (BENICAR HCT) 20-12.5 mg per tablet TAKE 1 TABLET BY MOUTH EVERY DAY 90 Tablet 1    atorvastatin (LIPITOR) 40 mg tablet TAKE 1 TABLET BY MOUTH EVERY DAY 90 Tablet 1    glimepiride (AMARYL) 4 mg tablet TAKE 1 TABLET BY MOUTH EVERY MORNING. WITH FOOD/BREAKFAST. 90 Tablet 1    metoprolol succinate (TOPROL-XL) 50 mg XL tablet TAKE 1 TABLET BY MOUTH EVERY DAY AT NIGHT 90 Tablet 2    glucose blood VI test strips (ASCENSIA AUTODISC VI, ONE TOUCH ULTRA TEST VI) strip Check blood sugar once daily. Please dispense brand preferred by insurance. Dx: E11.65 100 Strip 3    Blood-Glucose Meter monitoring kit Use to monitor blood sugar. Please dispense the brand preferred by insurance. Dx: E11.65 1 Kit 0    lancets misc Use to check blood sugar once daily Dx: E11.65 100 Each 3    levonorgestreL (MIRENA) 20 mcg/24 hours (5 yrs) 52 mg IUD 1 Device by IntraUTERine route once. Recommended healthy diet low in carbohydrates, fats, sodium and cholesterol. Recommended regular cardiovascular exercise 3-6 times per week for 30-60 minutes daily. Verbal and written instructions (see AVS) provided. Patient expresses understanding of diagnosis and treatment plan. Follow-up and Dispositions    Return in about 25 days (around 10/31/2022) for DM. Future Appointments   Date Time Provider Shereen Tam   10/22/2022  7:45 AM [facility] 2 [facility]   10/24/2022 11:30 AM Orly Martinez PA-C [facility]   7/25/2023 10:00 AM Deonte Farrell MD [facility]

## 2022-10-07 LAB
BUN SERPL-MCNC: 12 MG/DL (ref 6–24)
BUN/CREAT SERPL: 16 (ref 9–23)
CALCIUM SERPL-MCNC: 9.5 MG/DL (ref 8.7–10.2)
CHLORIDE SERPL-SCNC: 101 MMOL/L (ref 96–106)
CO2 SERPL-SCNC: 25 MMOL/L (ref 20–29)
CREAT SERPL-MCNC: 0.73 MG/DL (ref 0.57–1)
EGFR: 98 ML/MIN/1.73
EST. AVERAGE GLUCOSE BLD GHB EST-MCNC: 171 MG/DL
GLUCOSE SERPL-MCNC: 138 MG/DL (ref 70–99)
HBA1C MFR BLD: 7.6 % (ref 4.8–5.6)
POTASSIUM SERPL-SCNC: 4 MMOL/L (ref 3.5–5.2)
SODIUM SERPL-SCNC: 141 MMOL/L (ref 134–144)

## 2022-10-22 ENCOUNTER — HOSPITAL ENCOUNTER (OUTPATIENT)
Dept: MAMMOGRAPHY | Age: 52
Discharge: HOME | End: 2022-10-22
Payer: COMMERCIAL

## 2022-10-22 DIAGNOSIS — Z12.31 VISIT FOR SCREENING MAMMOGRAM: ICD-10-CM

## 2022-10-22 PROCEDURE — 77063 BREAST TOMOSYNTHESIS BI: CPT

## 2022-10-24 ENCOUNTER — OFFICE VISIT (OUTPATIENT)
Dept: INTERNAL MEDICINE CLINIC | Age: 52
End: 2022-10-24
Payer: COMMERCIAL

## 2022-10-24 VITALS
BODY MASS INDEX: 44.97 KG/M2 | WEIGHT: 287.1 LBS | HEART RATE: 85 BPM | TEMPERATURE: 98.2 F | OXYGEN SATURATION: 96 % | SYSTOLIC BLOOD PRESSURE: 140 MMHG | DIASTOLIC BLOOD PRESSURE: 90 MMHG

## 2022-10-24 DIAGNOSIS — E11.65 UNCONTROLLED TYPE 2 DIABETES MELLITUS WITH HYPERGLYCEMIA (HCC): Primary | ICD-10-CM

## 2022-10-24 DIAGNOSIS — I10 ESSENTIAL HYPERTENSION: ICD-10-CM

## 2022-10-24 PROCEDURE — 99213 OFFICE O/P EST LOW 20 MIN: CPT | Performed by: PHYSICIAN ASSISTANT

## 2022-10-24 PROCEDURE — 3051F HG A1C>EQUAL 7.0%<8.0%: CPT | Performed by: PHYSICIAN ASSISTANT

## 2022-10-24 RX ORDER — OLMESARTAN MEDOXOMIL AND HYDROCHLOROTHIAZIDE 40/12.5 40; 12.5 MG/1; MG/1
1 TABLET ORAL DAILY
Qty: 90 TABLET | Refills: 1 | Status: SHIPPED | OUTPATIENT
Start: 2022-10-24

## 2022-10-24 NOTE — PROGRESS NOTES
HIPAA verified by two patient identifiers. Elena Leal is a 47 y.o. female    Chief Complaint   Patient presents with    Diabetes     25 DAYS       Visit Vitals  BP (!) 170/88 (BP 1 Location: Left upper arm, BP Patient Position: Sitting, BP Cuff Size: Adult long)   Pulse 85   Temp 98.2 °F (36.8 °C) (Oral)   Resp (P) 16   Ht (P) 5' 7\" (1.702 m)   Wt 287 lb 1.6 oz (130.2 kg)   SpO2 96%   BMI (P) 44.97 kg/m²       Pain Scale: 0 - No pain/10  Pain Location:       Health Maintenance Due   Topic Date Due    Eye Exam Retinal or Dilated  Never done    Hepatitis B Vaccine (1 of 3 - Risk 3-dose series) Never done    DTaP/Tdap/Td series (1 - Tdap) Never done    Cervical cancer screen  Never done    MICROALBUMIN Q1  07/16/2022    Foot Exam Q1  08/16/2022         Coordination of Care Questionnaire:  :   1) Have you been to an emergency room, urgent care, or hospitalized since your last visit? If yes, where when, and reason for visit? no       2. Have seen or consulted any other health care provider since your last visit? If yes, where when, and reason for visit? NO      Patient is accompanied by self I have received verbal consent from Elena Leal to discuss any/all medical information while they are present in the room.

## 2022-10-24 NOTE — PROGRESS NOTES
HPI:  47 y.o.  presents for follow up appointment. No hospital, ER or specialist visits since last primary care visit except as noted below. Last visit 10/6/22    Uncontrolled diabetes  Visit 10/6/22 - A1c 7.8% on 8/4/2022  Current therapy: Glimepiride 4 mg every morning and metformin XR 1000 mg twice daily  - We reviewed how her A1c has been rising and rationale for adding additional therapy  I reviewed Rybelsus and Ozempic and she prefers pill form  Wrist benefits side effects of medication reviewed  No family history of thyroid cancer  She will start Rybelsus 3 mg 30 minutes before breakfast  She will lower glimepiride to 2 mg and take this with breakfast  Continue same metformin  Follow-up in the next 3 to 4 weeks to see how she is doing and likely plan for increasing to 7 mg dosage  If Rybelsus is not covered by her insurance she is willing to do the [address] - 10/24/22 - feeling well on current meds of glimepriride 2 mg, metformin Xr 1000mg BID and Rybelsus 3 mg daily, with good tolerance  A1C 7.6% on 10/6/22    Hypertension - on metoprolol XL 50 mg QHS (lowered from BID by Dr Salazar Aas 7/2021, started by cardiology for elevated BP, MVP and LVH), and olmesartan/HCTZ 20/12.5 daily; compliant with medication, no home monitoring. No history of heart disease or stroke. No chest pain, no shortness of breath, no headaches, no lower extremity swelling.       Patient Active Problem List    Diagnosis    Vitamin D deficiency    Controlled type 2 diabetes mellitus without complication, without long-term current use of insulin ([address]    Breast lesion on mammography    Mixed hyperlipidemia    Class 3 severe obesity with serious comorbidity and body mass index (BMI) of 40.0 to 44.9 in adult [facility])    Hypertension    Pseudoseizure         Past Medical History:   Diagnosis Date    Altered mental status, unspecified 03/02/2018    Breast lesion on mammography 07/18/2021    right breast, benign node unchanged on 6 month follow up    Diabetes ([facility] [address]     Heart murmur     Hypertension     Ill-defined condition     pseudo seizure    Syncope and collapse 05/21/2018       Social History     Tobacco Use    Smoking status: Never    Smokeless tobacco: Never   Vaping Use    Vaping Use: Never used   Substance Use Topics    Alcohol use: No    Drug use: No       Outpatient Medications Marked as Taking for the 10/24/22 encounter (Office Visit) with Orly Salazar PA-C   Medication Sig Dispense Refill    glimepiride (AMARYL) 4 mg tablet Take 0.5 Tablets by mouth every morning. With food/breakfast. (THIS IS A LOWER DOSE) 45 Tablet 1    semaglutide (Rybelsus) 3 mg tablet Take 1 Tablet by mouth Daily (before breakfast). 30 Tablet 1    metFORMIN ER (GLUCOPHAGE XR) 500 mg tablet TAKE 2 TABLETS BY MOUTH TWO (2) TIMES DAILY (WITH MEALS). 360 Tablet 1    olmesartan-hydroCHLOROthiazide (BENICAR HCT) 20-12.5 mg per tablet TAKE 1 TABLET BY MOUTH EVERY DAY 90 Tablet 1    atorvastatin (LIPITOR) 40 mg tablet TAKE 1 TABLET BY MOUTH EVERY DAY 90 Tablet 1    metoprolol succinate (TOPROL-XL) 50 mg XL tablet TAKE 1 TABLET BY MOUTH EVERY DAY AT NIGHT 90 Tablet 2    glucose blood VI test strips (ASCENSIA AUTODISC VI, ONE TOUCH ULTRA TEST VI) strip Check blood sugar once daily. Please dispense brand preferred by insurance. Dx: E11.65 100 Strip 3    Blood-Glucose Meter monitoring kit Use to monitor blood sugar. Please dispense the brand preferred by insurance. Dx: E11.65 1 Kit 0    lancets misc Use to check blood sugar once daily Dx: E11.65 100 Each 3    levonorgestreL (MIRENA) 20 mcg/24 hours (5 yrs) 52 mg IUD 1 Device by IntraUTERine route once. Allergies   Allergen Reactions    Decadron [Dexamethasone] Other (comments)     Joint swelling, rash       ROS:  ROS negative except as per HPI.       PE:  Visit Vitals  BP (!) 140/90 (BP 1 Location: Left arm, BP Patient Position: Sitting, BP Cuff Size: Large adult)   Pulse 85   Temp 98.2 °F (36.8 °C) (Oral)   Resp (P) 16   Ht (P) 5' 7\" (1.702 m)   Wt 287 lb 1.6 oz (130.2 kg)   SpO2 96%   BMI (P) 44.97 kg/m²     Gen: alert, oriented, no acute distress  Head: normocephalic, atraumatic  Eyes: pupils equal round reactive to light, sclera clear, conjunctiva clear  Neck: symmetric normal sized thyroid, no carotid bruits, no jugular vein distention  Resp: no increase work of breathing    Neuro: grossly intact  Skin: no lesion or rash  Extremities: no cyanosis or edema    No results found for this visit on 10/24/22. Results for orders placed or performed in visit on 46/78/80   METABOLIC PANEL, BASIC   Result Value Ref Range    Glucose 138 (H) 70 - 99 mg/dL    BUN 12 6 - 24 mg/dL    Creatinine 0.73 0.57 - 1.00 mg/dL    eGFR 98 >59 mL/min/1.73    BUN/Creatinine ratio 16 9 - 23    Sodium 141 134 - 144 mmol/L    Potassium 4.0 3.5 - 5.2 mmol/L    Chloride 101 96 - 106 mmol/L    CO2 25 20 - 29 mmol/L    Calcium 9.5 8.7 - 10.2 mg/dL   HEMOGLOBIN A1C WITH EAG   Result Value Ref Range    Hemoglobin A1c 7.6 (H) 4.8 - 5.6 %    Estimated average glucose 171 mg/dL     Lab Results   Component Value Date/Time    Cholesterol, total 115 04/25/2022 12:00 AM    HDL Cholesterol 37 (L) 04/25/2022 12:00 AM    LDL-C, External 154 04/15/2018 12:00 AM    LDL, calculated 65 04/25/2022 12:00 AM    LDL, calculated 83 01/27/2020 11:34 AM    VLDL, calculated 13 04/25/2022 12:00 AM    VLDL, calculated 15 01/27/2020 11:34 AM    Triglyceride 56 04/25/2022 12:00 AM         Assessment/Plan:      ICD-10-CM ICD-9-CM    1. Uncontrolled type 2 diabetes mellitus with hyperglycemia (HCC)  E11.65 250.02 semaglutide (Rybelsus) 7 mg tablet      2. Essential hypertension  I10 401.9 olmesartan-hydroCHLOROthiazide (BENICAR HCT) 40-12.5 mg per tablet          1.   Diabetes  A1c 7.6% on 10/6/2022  Current therapy: Metformin XR 1000 mg twice daily, glimepiride 2 mg (lowered from 4 mg 10/6/2022), plus Rybelsus 3 mg daily (added 10/6/2022)    She is tolerating the pulses without any nausea and feels well  I will increase Rybelsus to 7 mg, continue same glimepiride 2 mg and metformin XR 1000 mg twice daily  She will follow-up in 2 months for recheck    2. Hypertension  Blood pressure 140/90 today, has been borderline on recent visits  Current therapy: Metoprolol XL 50 mg nightly plus olmesartan HCTZ 20/12.5 mg daily  I will increase to olmesartan HCTZ 40/12.5 mg daily, continue same metoprolol XL 50 mg nightly  Follow-up for recheck in January      Health Maintenance reviewed - updated. Orders Placed This Encounter    semaglutide (Rybelsus) 7 mg tablet     Sig: Take 1 Tablet by mouth Daily (before breakfast). (THIS IS A NEW AND HIGHER DOSE)     Dispense:  90 Tablet     Refill:  1    olmesartan-hydroCHLOROthiazide (BENICAR HCT) 40-12.5 mg per tablet     Sig: Take 1 Tablet by mouth daily. (THIS IS A NEW AND HIGHER DOSE)     Dispense:  90 Tablet     Refill:  1       Medications Discontinued During This Encounter   Medication Reason    olmesartan-hydroCHLOROthiazide (BENICAR HCT) 20-12.5 mg per tablet DOSE ADJUSTMENT    semaglutide (Rybelsus) 3 mg tablet REORDER       Current Outpatient Medications   Medication Sig Dispense Refill    semaglutide (Rybelsus) 7 mg tablet Take 1 Tablet by mouth Daily (before breakfast). (THIS IS A NEW AND HIGHER DOSE) 90 Tablet 1    olmesartan-hydroCHLOROthiazide (BENICAR HCT) 40-12.5 mg per tablet Take 1 Tablet by mouth daily. (THIS IS A NEW AND HIGHER DOSE) 90 Tablet 1    glimepiride (AMARYL) 4 mg tablet Take 0.5 Tablets by mouth every morning. With food/breakfast. (THIS IS A LOWER DOSE) 45 Tablet 1    metFORMIN ER (GLUCOPHAGE XR) 500 mg tablet TAKE 2 TABLETS BY MOUTH TWO (2) TIMES DAILY (WITH MEALS).  360 Tablet 1    atorvastatin (LIPITOR) 40 mg tablet TAKE 1 TABLET BY MOUTH EVERY DAY 90 Tablet 1    metoprolol succinate (TOPROL-XL) 50 mg XL tablet TAKE 1 TABLET BY MOUTH EVERY DAY AT NIGHT 90 Tablet 2    glucose blood VI test strips (ASCENSIA AUTODISC VI, ONE TOUCH ULTRA TEST VI) strip Check blood sugar once daily. Please dispense brand preferred by insurance. Dx: E11.65 100 Strip 3    Blood-Glucose Meter monitoring kit Use to monitor blood sugar. Please dispense the brand preferred by insurance. Dx: E11.65 1 Kit 0    lancets misc Use to check blood sugar once daily Dx: E11.65 100 Each 3    levonorgestreL (MIRENA) 20 mcg/24 hours (5 yrs) 52 mg IUD 1 Device by IntraUTERine route once. Recommended healthy diet low in carbohydrates, fats, sodium and cholesterol. Recommended regular cardiovascular exercise 3-6 times per week for 30-60 minutes daily. Verbal and written instructions (see AVS) provided. Patient expresses understanding of diagnosis and treatment plan. Follow-up and Dispositions    Return in about 11 weeks (around 1/9/2023) for DM, HTN.

## 2023-01-19 DIAGNOSIS — E78.2 MIXED HYPERLIPIDEMIA: ICD-10-CM

## 2023-01-19 RX ORDER — METOPROLOL SUCCINATE 50 MG/1
TABLET, EXTENDED RELEASE ORAL
Qty: 90 TABLET | Refills: 1 | Status: SHIPPED | OUTPATIENT
Start: 2023-01-19

## 2023-01-19 RX ORDER — ATORVASTATIN CALCIUM 40 MG/1
TABLET, FILM COATED ORAL
Qty: 30 TABLET | Refills: 0 | Status: SHIPPED | OUTPATIENT
Start: 2023-01-19

## 2023-01-19 NOTE — TELEPHONE ENCOUNTER
RX refill request from the patient/pharmacy. Patient last seen 10- with labs, and does not have a f/up appointment.   Requested Prescriptions     Pending Prescriptions Disp Refills    atorvastatin (LIPITOR) 40 mg tablet [Pharmacy Med Name: ATORVASTATIN 40 MG TABLET] 30 Tablet 0     Sig: TAKE 1 TABLET BY MOUTH EVERY DAY

## 2023-01-19 NOTE — TELEPHONE ENCOUNTER
Cardiologist: Dr. Faustin     Last appt: 7/25/2022  Future Appointments   Date Time Provider Shereen Tam   7/25/2023 10:00 AM Murtis Jeans, MD [facility]       Requested Prescriptions     Signed Prescriptions Disp Refills    metoprolol succinate (TOPROL-XL) 50 mg XL tablet 90 Tablet 1     Sig: TAKE 1 TABLET BY MOUTH EVERY DAY AT NIGHT     Authorizing Provider: Surya Chavez     Ordering User: ALZIE QUIROS         Refills VO per Dr. Anitha Macias.

## 2023-01-28 DIAGNOSIS — E11.65 UNCONTROLLED TYPE 2 DIABETES MELLITUS WITH HYPERGLYCEMIA (HCC): ICD-10-CM

## 2023-02-03 RX ORDER — METFORMIN HYDROCHLORIDE 500 MG/1
TABLET, EXTENDED RELEASE ORAL
Qty: 360 TABLET | Refills: 0 | Status: SHIPPED | OUTPATIENT
Start: 2023-02-03

## 2023-02-15 DIAGNOSIS — E78.2 MIXED HYPERLIPIDEMIA: ICD-10-CM

## 2023-02-15 RX ORDER — ATORVASTATIN CALCIUM 40 MG/1
TABLET, FILM COATED ORAL
Qty: 30 TABLET | Refills: 1 | Status: SHIPPED | OUTPATIENT
Start: 2023-02-15

## 2023-02-15 NOTE — TELEPHONE ENCOUNTER
PCP: Santos Prince PA-C    Last appt: 10/24/2022  Future Appointments   Date Time Provider Shereen Tam   4/4/2023  8:00 AM KENNETH Guo [facility] AMB   7/25/2023 10:00 AM MD SHANDA Lindo AMB       Last refilled:1/19/23    Requested Prescriptions     Pending Prescriptions Disp Refills    atorvastatin (LIPITOR) 40 mg tablet [Pharmacy Med Name: ATORVASTATIN 40 MG TABLET] 30 Tablet 1     Sig: TAKE 1 TABLET BY MOUTH EVERY DAY

## 2023-04-04 ENCOUNTER — OFFICE VISIT (OUTPATIENT)
Dept: INTERNAL MEDICINE CLINIC | Age: 53
End: 2023-04-04
Payer: COMMERCIAL

## 2023-04-04 PROBLEM — E11.65 UNCONTROLLED TYPE 2 DIABETES MELLITUS WITH HYPERGLYCEMIA (HCC): Status: ACTIVE | Noted: 2023-04-04

## 2023-04-04 PROBLEM — R92.8 BREAST LESION ON MAMMOGRAPHY: Status: RESOLVED | Noted: 2021-07-18 | Resolved: 2023-04-04

## 2023-04-04 PROBLEM — F44.5 PSYCHOGENIC NONEPILEPTIC SEIZURE: Status: ACTIVE | Noted: 2018-03-02

## 2023-04-04 PROCEDURE — 99214 OFFICE O/P EST MOD 30 MIN: CPT | Performed by: NURSE PRACTITIONER

## 2023-04-04 PROCEDURE — 3075F SYST BP GE 130 - 139MM HG: CPT | Performed by: NURSE PRACTITIONER

## 2023-04-04 PROCEDURE — 3079F DIAST BP 80-89 MM HG: CPT | Performed by: NURSE PRACTITIONER

## 2023-04-04 RX ORDER — OLMESARTAN MEDOXOMIL AND HYDROCHLOROTHIAZIDE 40/25 40; 25 MG/1; MG/1
1 TABLET ORAL DAILY
Qty: 90 TABLET | Refills: 1 | Status: SHIPPED
Start: 2023-04-04

## 2023-04-04 NOTE — PROGRESS NOTES
Chief Complaint   Patient presents with    Establish Care     Visit Vitals  BP (!) 144/92 (BP 1 Location: Left upper arm, BP Patient Position: Sitting, BP Cuff Size: Large adult)   Pulse 70   Temp 98.4 °F (36.9 °C) (Oral)   Resp 17   Ht 5' 7\" (1.702 m)   Wt 291 lb 12.8 oz (132.4 kg)   SpO2 97%   BMI 45.70 kg/m²     1. Have you been to the ER, urgent care clinic since your last visit? Hospitalized since your last visit? No    2. Have you seen or consulted any other health care providers outside of the [address] [place] [address] since your last visit? Include any pap smears or colon screening.  No

## 2023-04-04 NOTE — PROGRESS NOTES
Varinder Watson (: 1970) is a 47 y.o. female here for evaluation of the following chief complaint(s):  Establish Care         SUBJECTIVE/OBJECTIVE:  HPI    Ms. Sabiha Araujo, 48-year-old female, here today to establish with new provider. She would like to discuss weight loss and is interested in a program Medi weight loss. Past medical history includes hypertension, type 2 diabetes, hyperlipidemia, morbid obesity, vitamin D deficiency, pseudoseizure. She denies chest pain, heart palpitations, lower extremity edema, dyspnea. Allergies   Allergen Reactions    Decadron [Dexamethasone] Other (comments)     Joint swelling, rash       Current Outpatient Medications   Medication Sig Dispense Refill    olmesartan-hydroCHLOROthiazide (BENICAR HCT) 40-25 mg per tablet Take 1 Tablet by mouth daily. 90 Tablet 1    atorvastatin (LIPITOR) 40 mg tablet TAKE 1 TABLET BY MOUTH EVERY DAY 30 Tablet 1    metFORMIN ER (GLUCOPHAGE XR) 500 mg tablet TAKE 2 TABLETS BY MOUTH 2 TIMES DAILY (WITH MEALS). 360 Tablet 0    metoprolol succinate (TOPROL-XL) 50 mg XL tablet TAKE 1 TABLET BY MOUTH EVERY DAY AT NIGHT 90 Tablet 1    semaglutide (Rybelsus) 7 mg tablet Take 1 Tablet by mouth Daily (before breakfast). (THIS IS A NEW AND HIGHER DOSE) 90 Tablet 1    glimepiride (AMARYL) 4 mg tablet Take 0.5 Tablets by mouth every morning. With food/breakfast. (THIS IS A LOWER DOSE) 45 Tablet 1    glucose blood VI test strips (ASCENSIA AUTODISC VI, ONE TOUCH ULTRA TEST VI) strip Check blood sugar once daily. Please dispense brand preferred by insurance. Dx: E11.65 100 Strip 3    Blood-Glucose Meter monitoring kit Use to monitor blood sugar. Please dispense the brand preferred by insurance. Dx: E11.65 1 Kit 0    lancets misc Use to check blood sugar once daily Dx: E11.65 100 Each 3    levonorgestreL (MIRENA) 20 mcg/24 hours (5 yrs) 52 mg IUD 1 Device by IntraUTERine route once.           Past Medical History:   Diagnosis Date    Altered mental status, unspecified 03/02/2018    Breast lesion on mammography 07/18/2021    right breast, benign node unchanged on 6 month follow up    Diabetes ([facility] [address]     Heart murmur     Hypertension     Ill-defined condition     pseudo seizure    Syncope and collapse 05/21/2018     Past Surgical History:   Procedure Laterality Date    HX GYN      \"cyst removal\"    HX SEPTOPLASTY      HX TONSIL AND ADENOIDECTOMY      TILT TABLE EVAL W/WO DRUG  5/21/2018          Social History     Socioeconomic History    Marital status: SINGLE     Spouse name: Not on file    Number of children: Not on file    Years of education: Not on file    Highest education level: Not on file   Occupational History    Not on file   Tobacco Use    Smoking status: Never    Smokeless tobacco: Never   Vaping Use    Vaping Use: Never used   Substance and Sexual Activity    Alcohol use: No    Drug use: No    Sexual activity: Not on file   Other Topics Concern    Not on file   Social History Narrative    Not on file     Social Determinants of Health     Financial Resource Strain: Not on file   Food Insecurity: Not on file   Transportation Needs: Not on file   Physical Activity: Not on file   Stress: Not on file   Social Connections: Not on file   Intimate Partner Violence: Not on file   Housing Stability: Not on file      History reviewed. No pertinent family history. Review of Systems   Constitutional:  Negative for activity change, appetite change, diaphoresis, fatigue, fever and unexpected weight change. HENT:  Negative for congestion, ear pain, facial swelling, hearing loss, postnasal drip, rhinorrhea, sinus pressure, sinus pain, sneezing, sore throat, tinnitus, trouble swallowing and voice change. Eyes:  Negative for photophobia, pain, redness and visual disturbance. Respiratory:  Negative for apnea, cough, chest tightness, shortness of breath and wheezing. Cardiovascular:  Negative for chest pain, palpitations and leg swelling. Gastrointestinal:  Negative for abdominal distention, abdominal pain, blood in stool, constipation, diarrhea, nausea, rectal pain and vomiting. Endocrine: Negative for cold intolerance, heat intolerance, polydipsia, polyphagia and polyuria. Genitourinary:  Negative for decreased urine volume, difficulty urinating, dyspareunia, dysuria, flank pain, frequency, hematuria, menstrual problem, pelvic pain, urgency, vaginal bleeding, vaginal discharge and vaginal pain. Musculoskeletal:  Negative for arthralgias, back pain, joint swelling, myalgias, neck pain and neck stiffness. Skin:  Negative for color change and rash. Allergic/Immunologic: Negative for environmental allergies and food allergies. Neurological:  Negative for dizziness, tremors, syncope, weakness, light-headedness, numbness and headaches. Hematological:  Negative for adenopathy. Does not bruise/bleed easily. Psychiatric/Behavioral:  Negative for confusion, hallucinations, self-injury, sleep disturbance and suicidal ideas. The patient is not nervous/anxious. /82   Pulse 70   Temp 98.4 °F (36.9 °C) (Oral)   Resp 17   Ht 5' 7\" (1.702 m)   Wt 291 lb 12.8 oz (132.4 kg)   SpO2 97%   BMI 45.70 kg/m²    No LMP recorded. (Menstrual status: IUD). Physical Exam  Constitutional:       Appearance: Normal appearance. She is obese. HENT:      Head: Normocephalic and atraumatic. Nose: Nose normal.      Mouth/Throat:      Mouth: Mucous membranes are moist.      Pharynx: Oropharynx is clear. Eyes:      Extraocular Movements: Extraocular movements intact. Conjunctiva/sclera: Conjunctivae normal.      Pupils: Pupils are equal, round, and reactive to light. Cardiovascular:      Rate and Rhythm: Normal rate and regular rhythm. Pulses: Normal pulses. Dorsalis pedis pulses are 2+ on the right side and 2+ on the left side. Posterior tibial pulses are 2+ on the right side and 2+ on the left side.       Heart sounds: Normal heart sounds. Pulmonary:      Effort: Pulmonary effort is normal.      Breath sounds: Normal breath sounds. Abdominal:      General: Abdomen is flat. Bowel sounds are normal.      Palpations: Abdomen is soft. Musculoskeletal:         General: Normal range of motion. Cervical back: Normal range of motion and neck supple. Feet:      Right foot:      Protective Sensation: 10 sites tested. 10 sites sensed. Skin integrity: Skin integrity normal.      Toenail Condition: Right toenails are normal.      Left foot:      Protective Sensation: 10 sites tested. 10 sites sensed. Skin integrity: Skin integrity normal.      Toenail Condition: Left toenails are normal.   Skin:     General: Skin is warm and dry. Capillary Refill: Capillary refill takes less than 2 seconds. Neurological:      General: No focal deficit present. Mental Status: She is alert and oriented to person, place, and time. Mental status is at baseline. Psychiatric:         Mood and Affect: Mood normal.         Behavior: Behavior normal.         Thought Content: Thought content normal.         Judgment: Judgment normal.       ASSESSMENT/PLAN:  Below is the assessment and plan developed based on review of pertinent history, physical exam, labs, studies, and medications. 1. Controlled type 2 diabetes mellitus without complication, without long-term current use of insulin ([facility] [address]  2. Uncontrolled type 2 diabetes mellitus with hyperglycemia (HCC)  -     CBC WITH AUTOMATED DIFF; Future  -     LIPID PANEL; Future  -     METABOLIC PANEL, COMPREHENSIVE; Future  -     TSH 3RD GENERATION; Future  -     URINALYSIS W/ RFLX MICROSCOPIC; Future  -     HEMOGLOBIN A1C WITH EAG; Future  -     MICROALBUMIN, UR, RAND W/ MICROALB/CREAT RATIO; Future  3. Morbid obesity with BMI of 45.0-49.9, adult ([facility] [address]  4. Primary hypertension  5. Psychogenic nonepileptic seizure  6. Mixed hyperlipidemia  7.  Vitamin D deficiency  -     VITAMIN D, 25 HYDROXY; Future    1. Currently taking metformin 1000 mg twice daily and Rybelsus semaglutide 7 mg daily and 2 mg glimepiride daily. Continue the same for now. 3.  She is interested in weight loss therapy has to nutrition as well as diabetes classes in past but needs more regimented program she reports she is interested in Medi weight loss. We will have consultation with them. We will increase semaglutide 14 mg which will help with her weight loss goal as well as blood sugar control. 4.  BP not at goal currently taking metoprolol succinate 50 mg daily as well as olmesartan 40 hydrochloric side 12.5 mg we will increase hydrochlorothiazide to 25 mg. She is to contact office is blood pressure is greater than 130/80. 5.  Reports had syncopal episodes in past with extensive work-up. Has not had a syncopal episode in past 4 years. Continue to monitor    6. Anticipate fasting lab work results. Return to office in 3 months sooner if needed. No results found for this visit on 04/04/23. No follow-ups on file. An electronic signature was used to authenticate this note.   -- KENNETH Garcia

## 2023-04-17 DIAGNOSIS — E11.65 UNCONTROLLED TYPE 2 DIABETES MELLITUS WITH HYPERGLYCEMIA (HCC): ICD-10-CM

## 2023-04-20 DIAGNOSIS — E78.2 MIXED HYPERLIPIDEMIA: ICD-10-CM

## 2023-04-20 RX ORDER — ATORVASTATIN CALCIUM 40 MG/1
40 TABLET, FILM COATED ORAL DAILY
Qty: 90 TABLET | Refills: 0 | Status: SHIPPED | OUTPATIENT
Start: 2023-04-20

## 2023-04-27 RX ORDER — GLIMEPIRIDE 4 MG/1
2 TABLET ORAL
Qty: 45 TABLET | Refills: 1 | Status: SHIPPED | OUTPATIENT
Start: 2023-04-27

## 2023-06-06 NOTE — PROGRESS NOTES
HPI:  45 y.o.  presents for follow up appointment. No hospital, ER or specialist visits since last primary care visit except as noted below. DM - current A1C 7.0% today,  previously 8.3% on 10/11/21;  A1C 11.1% on 8/16/2021, and 12.2% on 7/2021  -now on glimepiride 4 mg qAM (increased from 2 mg 8/16/21), and metformin XR 1000 mg BID  -on ARB and atorvastatin  -she was able to get glucometer since last visit, BS was 250s when eating poorly with sweet tea and fast food  -with her dietary changes her BS was running in the 90s, but spiked up to some readings in 150s in Jan 2022 associated with school starting again and staying late to study (she finishes school in July 2022)  -she is walking more and drinking more water  -she has stopped shopping on the chips and cookie aisle of the grocery store   -she has completed DEP in 10/2021  (-)microalbumin 7/2021  Eye exam 7/2021, record requested at visit 8/2021    -she notes she has gained weight since last visit (4 lbs)  Under stress at work and eating more fast food    Had ST, cough nasal congestion last week but negative covid test  Is taking coricidan for her symptoms    Hypertension - on metoprolol XL 50 mg QHS (lowered from BID by Dr Faye Fong 7/2021, started by cardiology for elevated BP, MVP and LVH), and olmesartan/HCTZ 20/12.5 daily; compliant with medication, no home monitoring. No history of heart disease or stroke.   No chest pain, no shortness of breath, no headaches, no lower extremity swelling.         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   hyperlipidemia    Class 3 severe obesity with serious comorbidity and body mass index (BMI) of 40.0 to 44.9 in adult ([facility] [address]    Hypertension    Pseudoseizure         Past Medical History:   Diagnosis Date    Altered mental status, unspecified 3/2/2018    Breast lesion on mammography 07/18/2021    right breast, benign node unchanged on 6 month follow up    Diabetes ([facility] [address]     Heart murmur     Hypertension     Ill-defined condition     pseudo seizure    Syncope and collapse 5/21/2018       Social History     Tobacco Use    Smoking status: Never Smoker    Smokeless tobacco: Never Used   Vaping Use    Vaping Use: Never used   Substance Use Topics    Alcohol use: No    Drug use: No       Outpatient Medications Marked as Taking for the 1/31/22 encounter (Office Visit) with Orly Martinez PA-C   Medication Sig Dispense Refill    olmesartan-hydroCHLOROthiazide (BENICAR HCT) 20-12.5 mg per tablet TAKE 1 TABLET BY MOUTH EVERY DAY 90 Tablet 1    atorvastatin (LIPITOR) 40 mg tablet TAKE 1 TABLET BY MOUTH EVERY DAY 90 Tablet 1    glimepiride (AMARYL) 4 mg tablet Take 1 Tablet by mouth every morning. With food/breakfast. 90 Tablet 1    glucose blood VI test strips (ASCENSIA AUTODISC VI, ONE TOUCH ULTRA TEST VI) strip Check blood sugar once daily. Please dispense brand preferred by insurance. Dx: E11.65 100 Strip 3    Blood-Glucose Meter monitoring kit Use to monitor blood sugar. Please dispense the brand preferred by insurance. Dx: E11.65 1 Kit 0    lancets misc Use to check blood sugar once daily Dx: E11.65 100 Each 3    metoprolol succinate (TOPROL-XL) 50 mg XL tablet Take 1 Tablet by mouth nightly. 90 Tablet 2    metFORMIN ER (GLUCOPHAGE XR) 500 mg tablet Take 2 Tablets by mouth two (2) times daily (with meals). Slowly increase up to 2 pills twice a day with meals as discussed 360 Tablet 1    levonorgestreL (MIRENA) 20 mcg/24 hours (5 yrs) 52 mg IUD 1 Device by IntraUTERine route once.          Allergies Allergen Reactions    Decadron [Dexamethasone] Other (comments)     Joint swelling, rash       ROS:  ROS negative except as per HPI. PE:  Visit Vitals  /80 (BP 1 Location: Left arm, BP Patient Position: Sitting, BP Cuff Size: Adult)   Pulse 85   Temp 97.8 °F (36.6 °C) (Temporal)   Resp 18   Ht 5' 7\" (1.702 m)   Wt 284 lb 3.2 oz (128.9 kg)   SpO2 98%   BMI 44.51 kg/m²     Gen: alert, oriented, no acute distress  Head: normocephalic, atraumatic  Eyes: pupils equal round reactive to light, sclera clear, conjunctiva clear  Oral: masked  Neck: supple  Resp: no increase work of breathing, lungs clear to ausculation bilaterally, no wheezing, rales or rhonchi  CV: S1, S2 normal.  No murmurs, rubs, or gallops. Abd: soft, not tender, not distended. Neuro: grossly intact  Skin: no lesion or rash  Extremities: no cyanosis or edema    Results for orders placed or performed in visit on 01/31/22   AMB POC HEMOGLOBIN A1C   Result Value Ref Range    Hemoglobin A1c (POC) 7.0 (A) 4.5 - 5.7 %     Lab Results   Component Value Date/Time    Cholesterol, total 117 10/11/2021 08:06 AM    HDL Cholesterol 36 (L) 10/11/2021 08:06 AM    LDL-C, External 154 04/15/2018 12:00 AM    LDL, calculated 67 10/11/2021 08:06 AM    LDL, calculated 83 01/27/2020 11:34 AM    VLDL, calculated 14 10/11/2021 08:06 AM    VLDL, calculated 15 01/27/2020 11:34 AM    Triglyceride 65 10/11/2021 08:06 AM     Lab Results   Component Value Date/Time    Sodium 141 10/11/2021 08:06 AM    Potassium 4.5 10/11/2021 08:06 AM    Chloride 106 10/11/2021 08:06 AM    CO2 24 10/11/2021 08:06 AM    Anion gap 6 02/23/2018 01:00 PM    Glucose 109 (H) 10/11/2021 08:06 AM    BUN 13 10/11/2021 08:06 AM    Creatinine 0.76 10/11/2021 08:06 AM    BUN/Creatinine ratio 17 10/11/2021 08:06 AM    GFR est  10/11/2021 08:06 AM    GFR est non-AA 90 10/11/2021 08:06 AM    Calcium 9.3 10/11/2021 08:06 AM    Bilirubin, total 0.5 10/11/2021 08:06 AM    Alk.  phosphatase 106 10/11/2021 08:06 AM    Protein, total 6.5 10/11/2021 08:06 AM    Albumin 3.6 (L) 10/11/2021 08:06 AM    Globulin 4.8 (H) 02/23/2018 01:00 PM    A-G Ratio 1.2 10/11/2021 08:06 AM    ALT (SGPT) 25 10/11/2021 08:06 AM    AST (SGOT) 19 10/11/2021 08:06 AM     Lab Results   Component Value Date/Time    WBC 7.6 07/16/2021 12:00 AM    Hemoglobin, POC 12.9 03/15/2019 10:03 AM    HGB 13.2 07/16/2021 12:00 AM    Hematocrit, POC 38 03/15/2019 10:03 AM    HCT 41.3 07/16/2021 12:00 AM    PLATELET 735 26/89/9237 12:00 AM    MCV 82 07/16/2021 12:00 AM     Lab Results   Component Value Date/Time    TSH 2.010 07/16/2021 12:00 AM     Lab Results   Component Value Date/Time    Hemoglobin A1c 8.3 (H) 10/11/2021 08:06 AM    Hemoglobin A1c 12.2 (H) 07/16/2021 12:00 AM    Hemoglobin A1c 6.8 (H) 01/27/2020 11:34 AM         Assessment/Plan:      ICD-10-CM ICD-9-CM    1. Uncontrolled type 2 diabetes mellitus with hyperglycemia (HCC)  E11.65 250.02 AMB POC HEMOGLOBIN A1C      HEMOGLOBIN A1C WITH EAG      METABOLIC PANEL, COMPREHENSIVE      [facility]      metFORMIN ER (GLUCOPHAGE XR) 500 mg tablet   2. Obesity, Class III, BMI 40-49.9 (morbid obesity) (HCC)  E66.01 278.01 TSH 3RD GENERATION   3. Essential hypertension  I10 401.9    4. Mixed hyperlipidemia  E78.2 272.2 LIPID PANEL   5. Vitamin D deficiency  E55.9 268.9 VITAMIN D, 25 HYDROXY     A1C now 7.0%, previously 8.3% in 10/2021, improved from 12.2% in July 2021  Current therapy: glimepiride 4 mg qAM (increased from 2 mg 8/16/21), and metformin XR 1000 mg BID  She has completed initial DEP in 10/2021  Continue current therapy  On ARB and statin  BP and lipids controlled and at goal, continue current regimen   Praised her efforts on dietary and lifestyle changes and her motivation  She is working on meal planning to help with weight loss  Fasting labs prior to next visit    Health Maintenance reviewed - updated.     Orders Placed This Encounter    VITAMIN D, 25 HYDROXY     Standing Status:   Future     Standing Expiration Date:   1/31/2023    HEMOGLOBIN A1C WITH EAG     Standing Status:   Future     Standing Expiration Date:   1/31/2023    LIPID PANEL     Standing Status:   Future     Standing Expiration Date:   0/02/4679    METABOLIC PANEL, COMPREHENSIVE     Standing Status:   Future     Standing Expiration Date:   1/31/2023    TSH 3RD GENERATION     Standing Status:   Future     Standing Expiration Date:   1/31/2023    AMB POC HEMOGLOBIN A1C    metFORMIN ER (GLUCOPHAGE XR) 500 mg tablet     Sig: Take 2 Tablets by mouth two (2) times daily (with meals). Dispense:  360 Tablet     Refill:  1       Medications Discontinued During This Encounter   Medication Reason    metFORMIN ER (GLUCOPHAGE XR) 500 mg tablet REORDER       Current Outpatient Medications   Medication Sig Dispense Refill    metFORMIN ER (GLUCOPHAGE XR) 500 mg tablet Take 2 Tablets by mouth two (2) times daily (with meals). 360 Tablet 1    olmesartan-hydroCHLOROthiazide (BENICAR HCT) 20-12.5 mg per tablet TAKE 1 TABLET BY MOUTH EVERY DAY 90 Tablet 1    atorvastatin (LIPITOR) 40 mg tablet TAKE 1 TABLET BY MOUTH EVERY DAY 90 Tablet 1    glimepiride (AMARYL) 4 mg tablet Take 1 Tablet by mouth every morning. With food/breakfast. 90 Tablet 1    glucose blood VI test strips (ASCENSIA AUTODISC VI, ONE TOUCH ULTRA TEST VI) strip Check blood sugar once daily. Please dispense brand preferred by insurance. Dx: E11.65 100 Strip 3    Blood-Glucose Meter monitoring kit Use to monitor blood sugar. Please dispense the brand preferred by insurance. Dx: E11.65 1 Kit 0    lancets misc Use to check blood sugar once daily Dx: E11.65 100 Each 3    metoprolol succinate (TOPROL-XL) 50 mg XL tablet Take 1 Tablet by mouth nightly. 90 Tablet 2    levonorgestreL (MIRENA) 20 mcg/24 hours (5 yrs) 52 mg IUD 1 Device by IntraUTERine route once. Recommended healthy diet low in carbohydrates, fats, sodium and cholesterol.   Recommended regular cardiovascular exercise 3-6 times per week for 30-60 minutes daily. Verbal and written instructions (see AVS) provided. Patient expresses understanding of diagnosis and treatment plan. Follow-up and Dispositions    · Return in about 3 months (around 4/30/2022) for DM; fasting labs 1 wk prior.        Future Appointments   Date Time Provider Shereen Tam   4/25/2022  8:00 AM LAB ONLY [facility] BS AMB   5/2/2022  9:30 AM Orly Martinez PA-C [facility] BS AMB   7/21/2022  8:20 AM Deonte Farrell MD [facility] BS AMB

## 2023-07-05 NOTE — TELEPHONE ENCOUNTER
PCP: LUCAS Grove NP    Last appt: 4/04/23  Future Appointments   Date Time Provider [address]   7/13/2023  9:30 AM LUCAS Grove NP [facility] BS AMB   7/25/2023 10:00 AM MD LOGAN Veras BS AMB       Last refilled:    Requested Prescriptions     Pending Prescriptions Disp Refills    RYBELSUS 14 MG TABS [Pharmacy Med Name: RYBELSUS 14 MG TABLET] 90 tablet      Sig: TAKE 1 TABLET BY MOUTH EVERY DAY BEFORE BREAKFAST

## 2023-07-06 RX ORDER — ORAL SEMAGLUTIDE 14 MG/1
TABLET ORAL
Qty: 90 TABLET | Refills: 0 | Status: SHIPPED | OUTPATIENT
Start: 2023-07-06

## 2023-07-13 ENCOUNTER — OFFICE VISIT (OUTPATIENT)
Facility: CLINIC | Age: 53
End: 2023-07-13
Payer: COMMERCIAL

## 2023-07-13 VITALS
RESPIRATION RATE: 18 BRPM | DIASTOLIC BLOOD PRESSURE: 81 MMHG | WEIGHT: 292.4 LBS | TEMPERATURE: 98.2 F | SYSTOLIC BLOOD PRESSURE: 118 MMHG | HEIGHT: 67 IN | HEART RATE: 84 BPM | OXYGEN SATURATION: 96 % | BODY MASS INDEX: 45.89 KG/M2

## 2023-07-13 DIAGNOSIS — M54.10 BILATERAL RADIATING LEG PAIN: ICD-10-CM

## 2023-07-13 DIAGNOSIS — E55.9 VITAMIN D DEFICIENCY: ICD-10-CM

## 2023-07-13 DIAGNOSIS — Z11.4 ENCOUNTER FOR SCREENING FOR HIV: ICD-10-CM

## 2023-07-13 DIAGNOSIS — I10 PRIMARY HYPERTENSION: Primary | ICD-10-CM

## 2023-07-13 DIAGNOSIS — F44.5 PSYCHOGENIC NONEPILEPTIC SEIZURE: ICD-10-CM

## 2023-07-13 DIAGNOSIS — E11.65 UNCONTROLLED TYPE 2 DIABETES MELLITUS WITH HYPERGLYCEMIA (HCC): ICD-10-CM

## 2023-07-13 DIAGNOSIS — E78.2 MIXED HYPERLIPIDEMIA: ICD-10-CM

## 2023-07-13 DIAGNOSIS — E66.01 CLASS 3 SEVERE OBESITY DUE TO EXCESS CALORIES WITH SERIOUS COMORBIDITY AND BODY MASS INDEX (BMI) OF 40.0 TO 44.9 IN ADULT (HCC): ICD-10-CM

## 2023-07-13 PROCEDURE — 3078F DIAST BP <80 MM HG: CPT | Performed by: NURSE PRACTITIONER

## 2023-07-13 PROCEDURE — 3051F HG A1C>EQUAL 7.0%<8.0%: CPT | Performed by: NURSE PRACTITIONER

## 2023-07-13 PROCEDURE — 99214 OFFICE O/P EST MOD 30 MIN: CPT | Performed by: NURSE PRACTITIONER

## 2023-07-13 PROCEDURE — 3074F SYST BP LT 130 MM HG: CPT | Performed by: NURSE PRACTITIONER

## 2023-07-13 SDOH — ECONOMIC STABILITY: FOOD INSECURITY: WITHIN THE PAST 12 MONTHS, YOU WORRIED THAT YOUR FOOD WOULD RUN OUT BEFORE YOU GOT MONEY TO BUY MORE.: NEVER TRUE

## 2023-07-13 SDOH — ECONOMIC STABILITY: INCOME INSECURITY: HOW HARD IS IT FOR YOU TO PAY FOR THE VERY BASICS LIKE FOOD, HOUSING, MEDICAL CARE, AND HEATING?: NOT HARD AT ALL

## 2023-07-13 SDOH — ECONOMIC STABILITY: FOOD INSECURITY: WITHIN THE PAST 12 MONTHS, THE FOOD YOU BOUGHT JUST DIDN'T LAST AND YOU DIDN'T HAVE MONEY TO GET MORE.: NEVER TRUE

## 2023-07-13 SDOH — ECONOMIC STABILITY: HOUSING INSECURITY
IN THE LAST 12 MONTHS, WAS THERE A TIME WHEN YOU DID NOT HAVE A STEADY PLACE TO SLEEP OR SLEPT IN A SHELTER (INCLUDING NOW)?: NO

## 2023-07-13 ASSESSMENT — PATIENT HEALTH QUESTIONNAIRE - PHQ9
SUM OF ALL RESPONSES TO PHQ QUESTIONS 1-9: 0
SUM OF ALL RESPONSES TO PHQ QUESTIONS 1-9: 0
SUM OF ALL RESPONSES TO PHQ9 QUESTIONS 1 & 2: 0
1. LITTLE INTEREST OR PLEASURE IN DOING THINGS: 0
2. FEELING DOWN, DEPRESSED OR HOPELESS: 0
SUM OF ALL RESPONSES TO PHQ QUESTIONS 1-9: 0
SUM OF ALL RESPONSES TO PHQ QUESTIONS 1-9: 0

## 2023-07-14 LAB
CK SERPL-CCNC: 108 U/L (ref 32–182)
HIV 1+2 AB+HIV1 P24 AG SERPL QL IA: NON REACTIVE

## 2023-07-17 DIAGNOSIS — E78.2 MIXED HYPERLIPIDEMIA: ICD-10-CM

## 2023-07-17 NOTE — TELEPHONE ENCOUNTER
PCP: LUCAS Martinez NP    Last appt: 7/13/2023  Future Appointments   Date Time Provider [address]   7/25/2023 10:00 AM MD LOGAN Moore AMB   11/13/2023  4:00 PM LUCAS Martinez NP [facility] BS AMB       Requested Prescriptions     Pending Prescriptions Disp Refills    atorvastatin (LIPITOR) 40 MG tablet [Pharmacy Med Name: ATORVASTATIN 40 MG TABLET] 90 tablet      Sig: TAKE 1 TABLET BY MOUTH DAILY.  TAKE AT BEDTIME       Prior labs and Blood pressures:  BP Readings from Last 3 Encounters:   07/13/23 118/81   04/04/23 138/82   10/24/22 (!) 140/90     Lab Results   Component Value Date/Time     04/04/2023 12:00 AM    K 4.5 04/04/2023 12:00 AM     04/04/2023 12:00 AM    CO2 25 04/04/2023 12:00 AM    BUN 13 04/04/2023 12:00 AM    GFRAA 104 10/11/2021 08:06 AM     Lab Results   Component Value Date/Time    NIY3DHNC 7.0 01/31/2022 04:50 PM     Lab Results   Component Value Date/Time    CHOL 121 04/04/2023 12:00 AM    HDL 37 04/04/2023 12:00 AM    VLDL 14 04/04/2023 12:00 AM     No results found for: VITD3, VD3RIA        Lab Results   Component Value Date/Time    TSH 2.230 04/04/2023 12:00 AM

## 2023-07-18 RX ORDER — ATORVASTATIN CALCIUM 40 MG/1
TABLET, FILM COATED ORAL
Qty: 90 TABLET | Refills: 0 | Status: SHIPPED | OUTPATIENT
Start: 2023-07-18

## 2023-07-18 RX ORDER — METOPROLOL SUCCINATE 50 MG/1
TABLET, EXTENDED RELEASE ORAL
Qty: 90 TABLET | Refills: 0 | Status: SHIPPED | OUTPATIENT
Start: 2023-07-18

## 2023-07-18 ASSESSMENT — ENCOUNTER SYMPTOMS
FACIAL SWELLING: 0
EYE DISCHARGE: 0
SHORTNESS OF BREATH: 0
EYE REDNESS: 0
WHEEZING: 0
BLOOD IN STOOL: 0
SINUS PAIN: 0
PHOTOPHOBIA: 0
APNEA: 0
SORE THROAT: 0
VOMITING: 0
SINUS PRESSURE: 0
ANAL BLEEDING: 0
ABDOMINAL PAIN: 0
COLOR CHANGE: 0
BACK PAIN: 0
CHOKING: 0
COUGH: 0
RECTAL PAIN: 0
DIARRHEA: 0
EYE PAIN: 0
NAUSEA: 0
TROUBLE SWALLOWING: 0
CONSTIPATION: 0

## 2023-07-18 NOTE — TELEPHONE ENCOUNTER
Cardiologist: Dr. Warren Police   Date Time Provider [address]   7/25/2023 10:00 AM MD LOGAN Whitman   11/13/2023  4:00 PM LUCAS Restrepo NP       Requested Prescriptions     Signed Prescriptions Disp Refills    metoprolol succinate (TOPROL XL) 50 MG extended release tablet 90 tablet 0     Sig: TAKE 1 TABLET BY MOUTH EVERY DAY AT NIGHT     Authorizing Provider: Guilherme Jacobs     Ordering User: JANAY CONDE         Refills VO per Dr. Howard Meter.

## 2023-07-25 ENCOUNTER — OFFICE VISIT (OUTPATIENT)
Age: 53
End: 2023-07-25
Payer: COMMERCIAL

## 2023-07-25 VITALS
OXYGEN SATURATION: 97 % | HEIGHT: 67 IN | DIASTOLIC BLOOD PRESSURE: 70 MMHG | HEART RATE: 82 BPM | BODY MASS INDEX: 45.83 KG/M2 | WEIGHT: 292 LBS | SYSTOLIC BLOOD PRESSURE: 110 MMHG | RESPIRATION RATE: 16 BRPM

## 2023-07-25 DIAGNOSIS — Z82.49 FAMILY HISTORY OF EARLY CAD: ICD-10-CM

## 2023-07-25 DIAGNOSIS — R55 SYNCOPE AND COLLAPSE: Primary | ICD-10-CM

## 2023-07-25 PROCEDURE — 99214 OFFICE O/P EST MOD 30 MIN: CPT | Performed by: SPECIALIST

## 2023-07-25 PROCEDURE — 93000 ELECTROCARDIOGRAM COMPLETE: CPT | Performed by: SPECIALIST

## 2023-07-25 PROCEDURE — 3078F DIAST BP <80 MM HG: CPT | Performed by: SPECIALIST

## 2023-07-25 PROCEDURE — 3074F SYST BP LT 130 MM HG: CPT | Performed by: SPECIALIST

## 2023-07-25 RX ORDER — OLMESARTAN MEDOXOMIL AND HYDROCHLOROTHIAZIDE 40/25 40; 25 MG/1; MG/1
1 TABLET ORAL DAILY
COMMUNITY
Start: 2023-07-05

## 2023-07-25 ASSESSMENT — PATIENT HEALTH QUESTIONNAIRE - PHQ9
2. FEELING DOWN, DEPRESSED OR HOPELESS: 0
SUM OF ALL RESPONSES TO PHQ QUESTIONS 1-9: 0
1. LITTLE INTEREST OR PLEASURE IN DOING THINGS: 0
SUM OF ALL RESPONSES TO PHQ QUESTIONS 1-9: 0
SUM OF ALL RESPONSES TO PHQ QUESTIONS 1-9: 0
SUM OF ALL RESPONSES TO PHQ9 QUESTIONS 1 & 2: 0
SUM OF ALL RESPONSES TO PHQ QUESTIONS 1-9: 0

## 2023-07-25 NOTE — PATIENT INSTRUCTIONS
You will be scheduled for a Treadmill Stress Echocardiogram Test after your appointment today. Please wear comfortable clothing (shorts or pants with a shirt or blouse) and walking/athletic shoes. Do not eat or drink anything, except water, for at least 2 hours prior to your test.    Do not take your Metoprolol 24 hours prior to your test.    **NOTE: You will need to follow up about 1 week after testing to review your results.  If you test date is changed for any reason, please change your follow up accordingly**

## 2023-07-25 NOTE — PROGRESS NOTES
[address] [facility]                                                            OFFICE NOTE        Terri Fields M.D.,[facility]   1970  [id number]    Date/Time:  7/25/202310:39 AM            SUBJECTIVE:  She has been doing very well since the last time I seen her. She has had no recurrent syncopal episodes. She has changed her job but she tells me that it is much less stress in the new position that she has. She denies any chest pain or shortness of breath. Assessment/Plan    1. Syncope: No recurrent syncopal episodes for \"pseudoseizures\"    To be noted that she had a normal tilt table test and normal cardiac work-up and neurological work-up in the past.     Likely intense stress from work was the cause of her symptoms. Today reveals normal sinus rhythm nonspecific ST-T wave abnormalities. Continue with Toprol-XL as previously prescribed. 2.  Hypertension: Her blood pressure is controlled at this time. 3.  Diabetes: Closely followed by her primary care physician. She does have a fairly strong family history for coronary events relatively young age. Discussed options. We will proceed with stress echocardiogram at the next office visit in 6 months. She will keep an eye on her blood pressure and let me know if anything changes. We discussed exercise and weight loss. Her primary care physician is closely follow her cholesterol. See her back in 6 months after stress echo      HPI     47 y.o. female. 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 Ejection fraction was estimated to be 60 %. There were no regional  wall motion abnormalities. There was mild to moderate concentric  hypertrophy.     Mitral valve: No echocardiographic evidence for

## 2023-07-26 DIAGNOSIS — M17.0 PRIMARY OSTEOARTHRITIS OF BOTH KNEES: Primary | ICD-10-CM

## 2023-10-02 NOTE — TELEPHONE ENCOUNTER
Requested Prescriptions     Pending Prescriptions Disp Refills    olmesartan-hydroCHLOROthiazide (BENICAR HCT) 40-25 MG per tablet [Pharmacy Med Name: OLMESARTAN-HCTZ 40-25 MG TAB] 90 tablet 1     Sig: TAKE 1 TABLET BY MOUTH EVERY DAY       RX refill request from the patient/pharmacy. Patient last seen 7/13/23 with labs, and next appt. scheduled for 11/13/23.

## 2023-10-03 RX ORDER — OLMESARTAN MEDOXOMIL AND HYDROCHLOROTHIAZIDE 40/25 40; 25 MG/1; MG/1
1 TABLET ORAL DAILY
Qty: 90 TABLET | Refills: 1 | Status: SHIPPED | OUTPATIENT
Start: 2023-10-03

## 2023-10-05 RX ORDER — ORAL SEMAGLUTIDE 14 MG/1
TABLET ORAL
Qty: 90 TABLET | Refills: 0 | Status: SHIPPED | OUTPATIENT
Start: 2023-10-05

## 2023-10-05 NOTE — TELEPHONE ENCOUNTER
PCP: LUCAS Quintero NP    Last appt: 7/13/2023  Future Appointments   Date Time Provider [address]   11/13/2023  4:00 PM LUCAS Quintero NP BS AMB   1/22/2024  9:00 AM BSC YING ECHO 2 LOGAN BS AMB   1/29/2024  8:20 AM Lee Ernandez MD [facility] BS AMB       Requested Prescriptions     Pending Prescriptions Disp Refills    RYBELSUS 14 MG TABS [Pharmacy Med Name: RYBELSUS 14 MG TABLET] 90 tablet 0     Sig: TAKE 1 TABLET BY MOUTH EVERY DAY BEFORE BREAKFAST       Prior labs and Blood pressures:  BP Readings from Last 3 Encounters:   07/25/23 110/70   07/13/23 118/81   04/04/23 138/82     Lab Results   Component Value Date/Time     04/04/2023 12:00 AM    K 4.5 04/04/2023 12:00 AM     04/04/2023 12:00 AM    CO2 25 04/04/2023 12:00 AM    BUN 13 04/04/2023 12:00 AM    GFRAA 104 10/11/2021 08:06 AM     Lab Results   Component Value Date/Time    AOH0UOTO 7.0 01/31/2022 04:50 PM     Lab Results   Component Value Date/Time    CHOL 121 04/04/2023 12:00 AM    HDL 37 04/04/2023 12:00 AM    VLDL 14 04/04/2023 12:00 AM     No results found for: \"VITD3\", \"VD3RIA\"        Lab Results   Component Value Date/Time    TSH 2.230 04/04/2023 12:00 AM

## 2023-10-12 RX ORDER — METOPROLOL SUCCINATE 50 MG/1
TABLET, EXTENDED RELEASE ORAL
Qty: 90 TABLET | Refills: 0 | Status: SHIPPED | OUTPATIENT
Start: 2023-10-12

## 2023-10-12 NOTE — TELEPHONE ENCOUNTER
Cardiologist: Dr. Rod Livingston   Date Time Provider [address]   11/13/2023  4:00 PM Scott Markham, LUCAS - NP [facility] BS AMB   1/22/2024  9:00 AM MEL YING ECHO 2 LOGAN [facility] AMB   1/29/2024  8:20 AM Lee Ernandez MD [facility] BS AMB       Requested Prescriptions     Signed Prescriptions Disp Refills    metoprolol succinate (TOPROL XL) 50 MG extended release tablet 90 tablet 0     Sig: TAKE 1 TABLET BY MOUTH EVERY DAY AT NIGHT     Authorizing Provider: Cristina Montoya     Ordering User: JANAY CONDE         Refills VO per Dr. Johny Addison.

## 2023-10-13 DIAGNOSIS — E78.2 MIXED HYPERLIPIDEMIA: ICD-10-CM

## 2023-10-13 RX ORDER — ATORVASTATIN CALCIUM 40 MG/1
TABLET, FILM COATED ORAL
Qty: 90 TABLET | Refills: 0 | Status: SHIPPED | OUTPATIENT
Start: 2023-10-13

## 2023-10-13 NOTE — TELEPHONE ENCOUNTER
PCP: LUCAS Martinez NP    Last appt: 7/13/2023  Future Appointments   Date Time Provider [address]   11/13/2023  4:00 PM LUCAS Martinez NP BS AMB   1/22/2024  9:00 AM BSC YING ECHO 2 [facility]AN BS AMB   1/29/2024  8:20 AM Lee Ernandez MD [facility] BS AMB       Requested Prescriptions     Pending Prescriptions Disp Refills    atorvastatin (LIPITOR) 40 MG tablet [Pharmacy Med Name: ATORVASTATIN 40 MG TABLET] 90 tablet 0     Sig: TAKE 1 TABLET BY MOUTH DAILY.  TAKE AT BEDTIME       Prior labs and Blood pressures:  BP Readings from Last 3 Encounters:   07/25/23 110/70   07/13/23 118/81   04/04/23 138/82     Lab Results   Component Value Date/Time     04/04/2023 12:00 AM    K 4.5 04/04/2023 12:00 AM     04/04/2023 12:00 AM    CO2 25 04/04/2023 12:00 AM    BUN 13 04/04/2023 12:00 AM    GFRAA 104 10/11/2021 08:06 AM     Lab Results   Component Value Date/Time    [id number] 7.0 01/31/2022 04:50 PM     Lab Results   Component Value Date/Time    CHOL 121 04/04/2023 12:00 AM    HDL 37 04/04/2023 12:00 AM    VLDL 14 04/04/2023 12:00 AM     No results found for: \"VITD3\", \"VD3RIA\"        Lab Results   Component Value Date/Time    TSH 2.230 04/04/2023 12:00 AM

## 2023-10-22 DIAGNOSIS — E11.65 TYPE 2 DIABETES MELLITUS WITH HYPERGLYCEMIA (HCC): ICD-10-CM

## 2023-10-23 NOTE — TELEPHONE ENCOUNTER
PCP: LUCAS Grove NP    Last appt: 7/13/2023  Future Appointments   Date Time Provider [address]   11/13/2023  4:00 PM LUCAS Grove NP [facility] BS AMB   1/22/2024  9:00 AM BSC YNIG ECHO 2 [facility] BS AMB   1/29/2024  8:20 AM Lee Ernandez MD [facility] BS AMB       Requested Prescriptions     Pending Prescriptions Disp Refills    glimepiride (AMARYL) 4 MG tablet [Pharmacy Med Name: GLIMEPIRIDE 4 MG TABLET] 45 tablet 1     Sig: TAKE 1/2 TABLET BY MOUTH EVERY MORNING. WITH FOOD/BREAKFAST. (THIS IS A LOWER DOSE)       Prior labs and Blood pressures:  BP Readings from Last 3 Encounters:   07/25/23 110/70   07/13/23 118/81   04/04/23 138/82     Lab Results   Component Value Date/Time     04/04/2023 12:00 AM    K 4.5 04/04/2023 12:00 AM     04/04/2023 12:00 AM    CO2 25 04/04/2023 12:00 AM    BUN 13 04/04/2023 12:00 AM    GFRAA 104 10/11/2021 08:06 AM     Lab Results   Component Value Date/Time    [id number] 7.0 01/31/2022 04:50 PM     Lab Results   Component Value Date/Time    CHOL 121 04/04/2023 12:00 AM    HDL 37 04/04/2023 12:00 AM    VLDL 14 04/04/2023 12:00 AM     No results found for: \"VITD3\", \"VD3RIA\"        Lab Results   Component Value Date/Time    TSH 2.230 04/04/2023 12:00 AM

## 2023-10-25 RX ORDER — GLIMEPIRIDE 4 MG/1
TABLET ORAL
Qty: 45 TABLET | Refills: 1 | Status: SHIPPED | OUTPATIENT
Start: 2023-10-25

## 2023-11-02 LAB — MAMMOGRAPHY, EXTERNAL: NORMAL

## 2023-11-13 ENCOUNTER — OFFICE VISIT (OUTPATIENT)
Facility: CLINIC | Age: 53
End: 2023-11-13
Payer: COMMERCIAL

## 2023-11-13 VITALS
OXYGEN SATURATION: 97 % | BODY MASS INDEX: 45.91 KG/M2 | DIASTOLIC BLOOD PRESSURE: 80 MMHG | RESPIRATION RATE: 16 BRPM | WEIGHT: 292.5 LBS | HEIGHT: 67 IN | TEMPERATURE: 98.1 F | SYSTOLIC BLOOD PRESSURE: 128 MMHG | HEART RATE: 84 BPM

## 2023-11-13 DIAGNOSIS — E78.2 MIXED HYPERLIPIDEMIA: ICD-10-CM

## 2023-11-13 DIAGNOSIS — E11.65 UNCONTROLLED TYPE 2 DIABETES MELLITUS WITH HYPERGLYCEMIA (HCC): ICD-10-CM

## 2023-11-13 DIAGNOSIS — I10 PRIMARY HYPERTENSION: Primary | ICD-10-CM

## 2023-11-13 DIAGNOSIS — E66.01 CLASS 3 SEVERE OBESITY DUE TO EXCESS CALORIES WITH SERIOUS COMORBIDITY AND BODY MASS INDEX (BMI) OF 40.0 TO 44.9 IN ADULT (HCC): ICD-10-CM

## 2023-11-13 PROCEDURE — 99213 OFFICE O/P EST LOW 20 MIN: CPT | Performed by: NURSE PRACTITIONER

## 2023-11-13 PROCEDURE — 3074F SYST BP LT 130 MM HG: CPT | Performed by: NURSE PRACTITIONER

## 2023-11-13 PROCEDURE — 3051F HG A1C>EQUAL 7.0%<8.0%: CPT | Performed by: NURSE PRACTITIONER

## 2023-11-13 PROCEDURE — 3079F DIAST BP 80-89 MM HG: CPT | Performed by: NURSE PRACTITIONER

## 2023-11-13 ASSESSMENT — ENCOUNTER SYMPTOMS
EYE REDNESS: 0
VOMITING: 0
BACK PAIN: 0
SORE THROAT: 0
CHOKING: 0
DIARRHEA: 0
APNEA: 0
EYE DISCHARGE: 0
COLOR CHANGE: 0
WHEEZING: 0
TROUBLE SWALLOWING: 0
ANAL BLEEDING: 0
FACIAL SWELLING: 0
NAUSEA: 0
EYE PAIN: 0
SINUS PRESSURE: 0
BLOOD IN STOOL: 0
CONSTIPATION: 0
ABDOMINAL PAIN: 0
SINUS PAIN: 0
COUGH: 0
RECTAL PAIN: 0
SHORTNESS OF BREATH: 0
PHOTOPHOBIA: 0

## 2023-11-13 NOTE — PROGRESS NOTES
Patricia Nuñez (: 1970) is a 46 y.o. female here for evaluation of the following chief complaint(s):  Diabetes (4M)         SUBJECTIVE/OBJECTIVE:  HPI    Ms. Mahad Hidalgo, 45 yo female, here today for follow-up on T2DM. She reports doing well overall. She has changed jobs and has been working on decreasing stress in life. She is due for a1c. Allergies   Allergen Reactions    Dexamethasone Other (See Comments) and Hives     Joint swelling, rash       Current Outpatient Medications   Medication Sig Dispense Refill    glimepiride (AMARYL) 4 MG tablet TAKE 1/2 TABLET BY MOUTH EVERY MORNING. WITH FOOD/BREAKFAST. (THIS IS A LOWER DOSE) 45 tablet 1    atorvastatin (LIPITOR) 40 MG tablet TAKE 1 TABLET BY MOUTH DAILY. TAKE AT BEDTIME 90 tablet 0    metoprolol succinate (TOPROL XL) 50 MG extended release tablet TAKE 1 TABLET BY MOUTH EVERY DAY AT NIGHT 90 tablet 0    RYBELSUS 14 MG TABS TAKE 1 TABLET BY MOUTH EVERY DAY BEFORE BREAKFAST 90 tablet 0    olmesartan-hydroCHLOROthiazide (BENICAR HCT) 40-25 MG per tablet TAKE 1 TABLET BY MOUTH EVERY DAY 90 tablet 1    Lancets MISC Use to check blood sugar once daily Dx: E11.65      levonorgestrel (MIRENA) IUD 52 mg 1 Device by IntraUTERine route once      metFORMIN (GLUCOPHAGE-XR) 500 MG extended release tablet TAKE 2 TABLETS BY MOUTH 2 TIMES DAILY (WITH MEALS). olmesartan-hydroCHLOROthiazide (BENICAR HCT) 40-12.5 MG per tablet Take 1 tablet by mouth daily (Patient not taking: Reported on 2023)       No current facility-administered medications for this visit.         Past Medical History:   Diagnosis Date    Altered mental status, unspecified 2018    Breast lesion on mammography 2021    right breast, benign node unchanged on 6 month follow up    Diabetes ([address])     Heart murmur     Hypertension     Ill-defined condition     pseudo seizure    Syncope and collapse 2018     Past Surgical History:   Procedure Laterality Date    GYN      \"cyst

## 2023-11-14 LAB
BASOPHILS # BLD AUTO: 0 X10E3/UL (ref 0–0.2)
BASOPHILS NFR BLD AUTO: 0 %
EOSINOPHIL # BLD AUTO: 0.2 X10E3/UL (ref 0–0.4)
EOSINOPHIL NFR BLD AUTO: 2 %
ERYTHROCYTE [DISTWIDTH] IN BLOOD BY AUTOMATED COUNT: 13.9 % (ref 11.7–15.4)
HBA1C MFR BLD: 8.4 % (ref 4.8–5.6)
HCT VFR BLD AUTO: 41 % (ref 34–46.6)
HGB BLD-MCNC: 13.3 G/DL (ref 11.1–15.9)
IMM GRANULOCYTES # BLD AUTO: 0 X10E3/UL (ref 0–0.1)
IMM GRANULOCYTES NFR BLD AUTO: 0 %
LYMPHOCYTES # BLD AUTO: 2.6 X10E3/UL (ref 0.7–3.1)
LYMPHOCYTES NFR BLD AUTO: 24 %
MCH RBC QN AUTO: 26 PG (ref 26.6–33)
MCHC RBC AUTO-ENTMCNC: 32.4 G/DL (ref 31.5–35.7)
MCV RBC AUTO: 80 FL (ref 79–97)
MONOCYTES # BLD AUTO: 0.6 X10E3/UL (ref 0.1–0.9)
MONOCYTES NFR BLD AUTO: 6 %
NEUTROPHILS # BLD AUTO: 7.4 X10E3/UL (ref 1.4–7)
NEUTROPHILS NFR BLD AUTO: 68 %
PLATELET # BLD AUTO: 398 X10E3/UL (ref 150–450)
RBC # BLD AUTO: 5.11 X10E6/UL (ref 3.77–5.28)
WBC # BLD AUTO: 10.9 X10E3/UL (ref 3.4–10.8)

## 2023-11-14 RX ORDER — METFORMIN HYDROCHLORIDE 500 MG/1
1000 TABLET, EXTENDED RELEASE ORAL 2 TIMES DAILY WITH MEALS
Qty: 360 TABLET | Refills: 0 | Status: SHIPPED | OUTPATIENT
Start: 2023-11-14

## 2023-11-14 NOTE — TELEPHONE ENCOUNTER
PCP: Todd Santiago APRN - NP    Last appt: 10/24/2022  Future Appointments   Date Time Provider Department Center   1/22/2024  9:00 AM [facility] YING ECHO 2 [facility] BS AMB   1/29/2024  8:20 AM Lee Ernandez MD [facility] AM[facility]   3/13/2024  8:00 AM LAB ONLY [facility] AM[facility]   3/19/2024  4:00 PM Todd Santiago APRN - NP [facility] AM[facility]       Requested Prescriptions     Pending Prescriptions Disp Refills    metFORMIN (GLUCOPHAGE-XR) 500 MG extended release tablet [Pharmacy Med Name: METFORMIN HCL  MG TABLET] 360 tablet      Sig: TAKE 2 TABLETS BY MOUTH TWICE A DAY WITH MEALS       Prior labs and Blood pressures:  BP Readings from Last 3 Encounters:   11/13/23 128/80   07/25/23 110/70   07/13/23 118/81     Lab Results   Component Value Date/Time     04/04/2023 12:00 AM    K 4.5 04/04/2023 12:00 AM     04/04/2023 12:00 AM    CO2 25 04/04/2023 12:00 AM    BUN 13 04/04/2023 12:00 AM    GFRAA 104 10/11/2021 08:06 AM     Lab Results   Component Value Date/Time    ROS1MABE 7.0 01/31/2022 04:50 PM     Lab Results   Component Value Date/Time    CHOL 121 04/04/2023 12:00 AM    HDL 37 04/04/2023 12:00 AM    VLDL 14 04/04/2023 12:00 AM     No results found for: \"VITD3\", \"VD3RIA\"        Lab Results   Component Value Date/Time    TSH 2.230 04/04/2023 12:00 AM

## 2023-11-15 LAB
ALBUMIN SERPL-MCNC: 4.1 G/DL (ref 3.8–4.9)
ALBUMIN/GLOB SERPL: 1 {RATIO} (ref 1.2–2.2)
ALP SERPL-CCNC: 121 IU/L (ref 44–121)
ALT SERPL-CCNC: 34 IU/L (ref 0–32)
AST SERPL-CCNC: 24 IU/L (ref 0–40)
BILIRUB SERPL-MCNC: 0.4 MG/DL (ref 0–1.2)
BUN SERPL-MCNC: 16 MG/DL (ref 6–24)
BUN/CREAT SERPL: 15 (ref 9–23)
CALCIUM SERPL-MCNC: 10.1 MG/DL (ref 8.7–10.2)
CHLORIDE SERPL-SCNC: 100 MMOL/L (ref 96–106)
CO2 SERPL-SCNC: 25 MMOL/L (ref 20–29)
CREAT SERPL-MCNC: 1.05 MG/DL (ref 0.57–1)
EGFRCR SERPLBLD CKD-EPI 2021: 63 ML/MIN/1.73
GLOBULIN SER CALC-MCNC: 4.1 G/DL (ref 1.5–4.5)
GLUCOSE SERPL-MCNC: 150 MG/DL (ref 70–99)
POTASSIUM SERPL-SCNC: 4 MMOL/L (ref 3.5–5.2)
PROT SERPL-MCNC: 8.2 G/DL (ref 6–8.5)
SODIUM SERPL-SCNC: 140 MMOL/L (ref 134–144)

## 2024-01-07 DIAGNOSIS — E78.2 MIXED HYPERLIPIDEMIA: ICD-10-CM

## 2024-01-08 RX ORDER — ATORVASTATIN CALCIUM 40 MG/1
TABLET, FILM COATED ORAL
Qty: 90 TABLET | Refills: 1 | Status: SHIPPED | OUTPATIENT
Start: 2024-01-08

## 2024-01-08 RX ORDER — METOPROLOL SUCCINATE 50 MG/1
TABLET, EXTENDED RELEASE ORAL
Qty: 90 TABLET | Refills: 1 | Status: SHIPPED | OUTPATIENT
Start: 2024-01-08

## 2024-01-08 NOTE — TELEPHONE ENCOUNTER
PCP: Todd Santiago APRN - NP    Last appt: 11/13/2023    Future Appointments   Date Time Provider Department Center   1/22/2024  9:00 AM [facility] YING ECHO 2 [facility] AMB   1/29/2024  8:20 AM Lee Ernandez MD [facility] AMB   3/13/2024  8:00 AM LAB ONLY [facility] AMB   3/19/2024  4:00 PM [facility]       Requested Prescriptions     Pending Prescriptions Disp Refills    atorvastatin (LIPITOR) 40 MG tablet [Pharmacy Med Name: ATORVASTATIN 40 MG TABLET] 90 tablet 0     Sig: TAKE 1 TABLET BY MOUTH DAILY. TAKE AT BEDTIME

## 2024-01-08 NOTE — TELEPHONE ENCOUNTER
Cardiologist: Dr. Ernandez    Future Appointments   Date Time Provider Department Center   1/22/2024  9:00 AM BS STEPAN ECHO 2 [facility] AMB   1/29/2024  8:20 AM Lee Ernandez MD [facility] AMB   3/13/2024  8:00 AM LAB ONLY [facility] BS AMB   3/19/2024  4:00 PM Todd Santiago, APRN - NP [facility] BS AMB       Requested Prescriptions     Signed Prescriptions Disp Refills    metoprolol succinate (TOPROL XL) 50 MG extended release tablet 90 tablet 1     Sig: TAKE 1 TABLET BY MOUTH EVERY DAY AT NIGHT     Authorizing Provider: LEE ERNANDEZ     Ordering User: JANAY CONDE         Refills VO per Dr. Ernandez.

## 2024-01-19 ENCOUNTER — TELEPHONE (OUTPATIENT)
Age: 54
End: 2024-01-19

## 2024-01-19 NOTE — TELEPHONE ENCOUNTER
Good Afternoon,    The StressEcho Dr. Ernandez ordered is STILL PENDING with Linda. Please call the patient reschedule this appt.     Thanks~

## 2024-01-19 NOTE — TELEPHONE ENCOUNTER
Identifiers x 2. Informed that authorization for test pending. Will need to cancel test that is scheduled for 1-22-24. Will reschedule follow up, if necessary.

## 2024-01-22 ENCOUNTER — TELEPHONE (OUTPATIENT)
Age: 54
End: 2024-01-22

## 2024-01-22 NOTE — TELEPHONE ENCOUNTER
Good Morning     The StressEcho Dr. Ernandez ordered was DENIED by Linda because: Based on [employer] [employer] Guidelines Section(s): Stress Testing with Imaging - Indications (CD 1.4), we cannot approve this request. Your healthcare provider told us that there is a concern related to the blood vessels that carry blood to your heart. The request cannot be approved because:    You must have one of the following with new, recurrent, or worsening signs or symptoms suggestive of decreased oxygen to your heart muscle (cardiac ischemia).  -Findings on a tracing of your heart's electrical activity (electrocardiogram or ECG) that could make it hard to tell if your heart is getting enough blood supply with exercise.  -An exercise stress test (an ECG and blood pressure check done before, during, and after walking on a treadmill) would not be safe for you due to a limitation as listed in the guideline.  -Narrowing in the blood vessels causing decreased blood supply to your heart muscle (obstructive coronary artery disease as defined in the guideline).  -Other indication listed in this guideline.    If a Peer to Peer need to be done Linda can be reached at [phone] with a case number of [id number]     The test was already cancelled.     Thanks~

## 2024-01-24 NOTE — TELEPHONE ENCOUNTER
Spoke with Deborah with insurance company.  Peer to peer scheduled with Dr. Kinney on 1-24-24 @ 3:45 pm.

## 2024-01-25 NOTE — TELEPHONE ENCOUNTER
Pt. Returning Mayi's call. She is not able to come in on the 29th, wants a call back before another day is scheduled.    Pt. Phone - [phone]

## 2024-01-25 NOTE — TELEPHONE ENCOUNTER
Peer to peer not done.     Per Dr. Ernandez:    Proceed with treadmill only at this time           Attempted to reach patient by telephone. A message was left for return call.

## 2024-01-29 NOTE — TELEPHONE ENCOUNTER
PCP: Todd Santiago APRN - NP    Last appt: Visit date not found    Future Appointments   Date Time Provider Department Center   3/13/2024  8:00 AM LAB ONLY [facility]   3/19/2024  4:00 PM Todd Santiago APRN - NP [facility] AMB   4/1/2024  9:00 AM BSC YING ECHO 2 [facility] AMB   4/10/2024  1:40 PM Lee Ernandez MD [facility] BS AMB       Requested Prescriptions     Pending Prescriptions Disp Refills    RYBELSUS 14 MG TABS [Pharmacy Med Name: RYBELSUS 14 MG TABLET] 90 tablet 1     Sig: TAKE 1 TABLET BY MOUTH EVERY DAY BEFORE BREAKFAST

## 2024-02-01 RX ORDER — ORAL SEMAGLUTIDE 14 MG/1
TABLET ORAL
Qty: 90 TABLET | Refills: 1 | Status: SHIPPED | OUTPATIENT
Start: 2024-02-01

## 2024-02-05 NOTE — TELEPHONE ENCOUNTER
TC to pt, ID verified. Offered to move up stress test that is currently scheduled in April, as she does not need a SE, and just a routine stress test. Pt would like to keep appts as is. Stress appt for April changed to a routine stress same time. No further questions. Advised she call if she needs anything before then.

## 2024-02-13 NOTE — TELEPHONE ENCOUNTER
PCP: Todd Santiago APRN - NP    Last appt: 11/13/2023  Future Appointments   Date Time Provider Department Center   3/13/2024  8:00 AM LAB ONLY [facility] AM[facility]   3/19/2024  4:00 PM Todd Santiago APRN - NP [facility] AM[facility]   4/1/2024  9:00 AM BSC YING STRESS ECHO [facility] AMB   4/10/2024  1:40 PM Lee Ernandez MD [facility] BS AMB       Requested Prescriptions     Pending Prescriptions Disp Refills    metFORMIN (GLUCOPHAGE-XR) 500 MG extended release tablet 360 tablet 0     Sig: Take 2 tablets by mouth with breakfast and with evening meal       Prior labs and Blood pressures:  BP Readings from Last 3 Encounters:   11/13/23 128/80   07/25/23 110/70   07/13/23 118/81     Lab Results   Component Value Date/Time     11/13/2023 12:00 AM    K 4.0 11/13/2023 12:00 AM     11/13/2023 12:00 AM    CO2 25 11/13/2023 12:00 AM    BUN 16 11/13/2023 12:00 AM    GFRAA 104 10/11/2021 08:06 AM     Lab Results   Component Value Date/Time    [id number] 7.0 01/31/2022 04:50 PM     Lab Results   Component Value Date/Time    CHOL 121 04/04/2023 12:00 AM    HDL 37 04/04/2023 12:00 AM    VLDL 14 04/04/2023 12:00 AM     No results found for: \"VITD3\", \"VD3RIA\"        Lab Results   Component Value Date/Time    TSH 2.230 04/04/2023 12:00 AM

## 2024-02-13 NOTE — TELEPHONE ENCOUNTER
Pharmacy: [facility]      metFORMIN (GLUCOPHAGE-XR) 500 MG extended release tablet [[id number]]    Order Details  Dose: 1,000 mg Route: Oral Frequency: 2 times daily with meals   Dispense Quantity: 360 tablet Refills: 0          Sig: TAKE 2 TABLETS BY MOUTH TWICE A DAY WITH MEALS

## 2024-02-14 RX ORDER — METFORMIN HYDROCHLORIDE 500 MG/1
1000 TABLET, EXTENDED RELEASE ORAL 2 TIMES DAILY WITH MEALS
Qty: 360 TABLET | Refills: 3 | Status: SHIPPED | OUTPATIENT
Start: 2024-02-14

## 2024-04-05 RX ORDER — OLMESARTAN MEDOXOMIL AND HYDROCHLOROTHIAZIDE 40/25 40; 25 MG/1; MG/1
1 TABLET ORAL DAILY
Qty: 90 TABLET | Refills: 0 | Status: SHIPPED | OUTPATIENT
Start: 2024-04-05

## 2024-04-05 NOTE — TELEPHONE ENCOUNTER
PCP: No primary care provider on file.    Last appt: 11/13/2023    No future appointments.  On list to see Dr. Dove    Requested Prescriptions     Pending Prescriptions Disp Refills    olmesartan-hydroCHLOROthiazide (BENICAR HCT) 40-25 MG per tablet [Pharmacy Med Name: OLMESARTAN-HCTZ 40-25 MG TAB] 90 tablet 0     Sig: TAKE 1 TABLET BY MOUTH EVERY DAY

## 2024-04-22 DIAGNOSIS — E11.65 TYPE 2 DIABETES MELLITUS WITH HYPERGLYCEMIA (HCC): ICD-10-CM

## 2024-04-22 RX ORDER — GLIMEPIRIDE 4 MG/1
TABLET ORAL
Qty: 45 TABLET | Refills: 0 | Status: SHIPPED | OUTPATIENT
Start: 2024-04-22

## 2024-04-22 NOTE — TELEPHONE ENCOUNTER
RX refill request from the patient/pharmacy. Patient last seen 11- with labs, and does not have a f/up appointment.  Requested Prescriptions     Pending Prescriptions Disp Refills    glimepiride (AMARYL) 4 MG tablet 45 tablet 0     Sig: TAKE 1/2 TABLET BY MOUTH EVERY MORNING. WITH FOOD/BREAKFAST. (THIS IS A LOWER DOSE)

## 2024-07-01 RX ORDER — OLMESARTAN MEDOXOMIL AND HYDROCHLOROTHIAZIDE 40/25 40; 25 MG/1; MG/1
1 TABLET ORAL DAILY
Qty: 90 TABLET | Refills: 0 | Status: SHIPPED | OUTPATIENT
Start: 2024-07-01

## 2024-07-01 NOTE — TELEPHONE ENCOUNTER
PCP: No primary care provider on file.    Last appt: Visit date not found    No future appointments. Patient on the list to see Dr. Dove      Requested Prescriptions     Pending Prescriptions Disp Refills    olmesartan-hydroCHLOROthiazide (BENICAR HCT) 40-25 MG per tablet [Pharmacy Med Name: OLMESARTAN-HCTZ 40-25 MG TAB] 90 tablet 0     Sig: TAKE 1 TABLET BY MOUTH EVERY DAY

## 2024-07-05 RX ORDER — METOPROLOL SUCCINATE 50 MG/1
50 TABLET, EXTENDED RELEASE ORAL NIGHTLY
Qty: 90 TABLET | Refills: 0 | Status: SHIPPED | OUTPATIENT
Start: 2024-07-05

## 2024-07-05 NOTE — TELEPHONE ENCOUNTER
Requested Prescriptions     Signed Prescriptions Disp Refills    metoprolol succinate (TOPROL XL) 50 MG extended release tablet 90 tablet 0     Sig: Take 1 tablet by mouth nightly (Please schedule follow up appointment with Dr. Ernandez to receive further refills)     Authorizing Provider: OXANA ERNANDEZ     Ordering User: VITA RUSS     Verbal order per Dr. Ernandez.

## 2024-07-08 DIAGNOSIS — E78.2 MIXED HYPERLIPIDEMIA: ICD-10-CM

## 2024-07-08 RX ORDER — ATORVASTATIN CALCIUM 40 MG/1
TABLET, FILM COATED ORAL
Qty: 90 TABLET | Refills: 1 | Status: SHIPPED | OUTPATIENT
Start: 2024-07-08

## 2024-07-08 NOTE — TELEPHONE ENCOUNTER
Patient is on the list for Dr. Dove.     PCP: No primary care provider on file.    Last appt: Visit date not found  No future appointments.    Requested Prescriptions     Pending Prescriptions Disp Refills    atorvastatin (LIPITOR) 40 MG tablet 90 tablet 1     Sig: TAKE 1 TABLET BY MOUTH DAILY. TAKE AT BEDTIME       Prior labs and Blood pressures:  BP Readings from Last 3 Encounters:   11/13/23 128/80   07/25/23 110/70   07/13/23 118/81     Lab Results   Component Value Date/Time     11/13/2023 12:00 AM    K 4.0 11/13/2023 12:00 AM     11/13/2023 12:00 AM    CO2 25 11/13/2023 12:00 AM    BUN 16 11/13/2023 12:00 AM    GFRAA 104 10/11/2021 08:06 AM     Lab Results   Component Value Date/Time    [id number] 7.0 01/31/2022 04:50 PM     Lab Results   Component Value Date/Time    CHOL 121 04/04/2023 12:00 AM    HDL 37 04/04/2023 12:00 AM    LDL 70 04/04/2023 12:00 AM    VLDL 14 04/04/2023 12:00 AM     No results found for: \"VITD3\"        Lab Results   Component Value Date/Time    TSH 2.230 04/04/2023 12:00 AM

## 2024-07-18 DIAGNOSIS — E11.65 TYPE 2 DIABETES MELLITUS WITH HYPERGLYCEMIA (HCC): ICD-10-CM

## 2024-07-18 RX ORDER — GLIMEPIRIDE 4 MG/1
TABLET ORAL
Qty: 45 TABLET | Refills: 0 | Status: SHIPPED | OUTPATIENT
Start: 2024-07-18

## 2024-07-18 NOTE — TELEPHONE ENCOUNTER
PCP: No primary care provider on file.    Last appt: Visit date not found  Future Appointments   Date Time Provider Department Center   10/3/2024  8:20 AM Kelly Spicer APRN - NP [facility] AMB       Requested Prescriptions     Pending Prescriptions Disp Refills    glimepiride (AMARYL) 4 MG tablet [Pharmacy Med Name: GLIMEPIRIDE 4 MG TABLET] 45 tablet 0     Sig: TAKE 1/2 TABLET BY MOUTH EVERY MORNING. WITH FOOD/BREAKFAST. (THIS IS A LOWER DOSE)       Prior labs and Blood pressures:  BP Readings from Last 3 Encounters:   07/15/24 (!) 160/80   11/13/23 128/80   07/25/23 110/70     Lab Results   Component Value Date/Time     11/13/2023 12:00 AM    K 4.0 11/13/2023 12:00 AM     11/13/2023 12:00 AM    CO2 25 11/13/2023 12:00 AM    BUN 16 11/13/2023 12:00 AM    GFRAA 104 10/11/2021 08:06 AM     Lab Results   Component Value Date/Time    [id number] 7.0 01/31/2022 04:50 PM     Lab Results   Component Value Date/Time    CHOL 121 04/04/2023 12:00 AM    HDL 37 04/04/2023 12:00 AM    LDL 70 04/04/2023 12:00 AM    VLDL 14 04/04/2023 12:00 AM     No results found for: \"VITD3\"        Lab Results   Component Value Date/Time    TSH 2.230 04/04/2023 12:00 AM

## 2024-09-26 RX ORDER — OLMESARTAN MEDOXOMIL AND HYDROCHLOROTHIAZIDE 40/25 40; 25 MG/1; MG/1
1 TABLET ORAL DAILY
Qty: 30 TABLET | Refills: 0 | Status: SHIPPED | OUTPATIENT
Start: 2024-09-26

## 2024-10-07 RX ORDER — METOPROLOL SUCCINATE 50 MG/1
50 TABLET, EXTENDED RELEASE ORAL EVERY EVENING
Qty: 30 TABLET | Refills: 1 | Status: SHIPPED | OUTPATIENT
Start: 2024-10-07

## 2024-10-07 NOTE — TELEPHONE ENCOUNTER
Cardiologist: Dr. Ernandez    Future Appointments   Date Time Provider Department Center   11/4/2024 11:00 AM Cece Dove MD [facility]   11/4/2024  3:00 PM Kelly Spicer APRN - NP [facility]       Requested Prescriptions     Signed Prescriptions Disp Refills    metoprolol succinate (TOPROL XL) 50 MG extended release tablet 30 tablet 1     Sig: Take 1 tablet by mouth every evening **FOLLOW UP FOR FURTHER REFILLS**     Authorizing Provider: OXANA ERNANDEZ     Ordering User: JANAY CONDE         Refills VO per Dr. Ernandze.

## 2024-10-13 DIAGNOSIS — E11.65 TYPE 2 DIABETES MELLITUS WITH HYPERGLYCEMIA (HCC): ICD-10-CM

## 2024-10-14 RX ORDER — GLIMEPIRIDE 2 MG/1
2 TABLET ORAL
Qty: 90 TABLET | Refills: 1 | Status: SHIPPED | OUTPATIENT
Start: 2024-10-14

## 2024-10-14 NOTE — TELEPHONE ENCOUNTER
PCP: No primary care provider on file.    Last appt: Visit date not found    Future Appointments   Date Time Provider Department Center   11/4/2024 11:00 AM Cece Dove MD [facility]   11/4/2024  3:00 PM Kelly Spicer, APRN - NP [facility] AMB       Requested Prescriptions     Pending Prescriptions Disp Refills    glimepiride (AMARYL) 4 MG tablet [Pharmacy Med Name: GLIMEPIRIDE 4 MG TABLET] 45 tablet 0     Sig: TAKE 1/2 TABLET BY MOUTH EVERY MORNING. WITH FOOD/BREAKFAST. (THIS IS A LOWER DOSE)

## 2024-10-30 RX ORDER — OLMESARTAN MEDOXOMIL AND HYDROCHLOROTHIAZIDE 40/25 40; 25 MG/1; MG/1
1 TABLET ORAL DAILY
Qty: 30 TABLET | Refills: 0 | Status: SHIPPED | OUTPATIENT
Start: 2024-10-30

## 2024-10-30 NOTE — TELEPHONE ENCOUNTER
PCP: No primary care provider on file.    Last appt: Visit date not found    Future Appointments   Date Time Provider Department Center   11/4/2024 11:00 AM Cece Dove MD [facility]   11/11/2024  3:00 PM Kelly Spicer, APRN - NP [facility] AMB       Requested Prescriptions     Pending Prescriptions Disp Refills    olmesartan-hydroCHLOROthiazide (BENICAR HCT) 40-25 MG per tablet [Pharmacy Med Name: OLMESARTAN-HCTZ 40-25 MG TAB] 30 tablet 0     Sig: TAKE 1 TABLET BY MOUTH EVERY DAY

## 2024-11-04 ENCOUNTER — OFFICE VISIT (OUTPATIENT)
Facility: CLINIC | Age: 54
End: 2024-11-04
Payer: COMMERCIAL

## 2024-11-04 VITALS
RESPIRATION RATE: 16 BRPM | BODY MASS INDEX: 43.35 KG/M2 | SYSTOLIC BLOOD PRESSURE: 127 MMHG | DIASTOLIC BLOOD PRESSURE: 85 MMHG | HEART RATE: 68 BPM | OXYGEN SATURATION: 95 % | TEMPERATURE: 98.5 F | HEIGHT: 67 IN | WEIGHT: 276.2 LBS

## 2024-11-04 DIAGNOSIS — E66.813 CLASS 3 SEVERE OBESITY DUE TO EXCESS CALORIES WITH SERIOUS COMORBIDITY AND BODY MASS INDEX (BMI) OF 40.0 TO 44.9 IN ADULT: ICD-10-CM

## 2024-11-04 DIAGNOSIS — I10 PRIMARY HYPERTENSION: ICD-10-CM

## 2024-11-04 DIAGNOSIS — M25.561 CHRONIC PAIN OF RIGHT KNEE: ICD-10-CM

## 2024-11-04 DIAGNOSIS — E11.65 UNCONTROLLED TYPE 2 DIABETES MELLITUS WITH HYPERGLYCEMIA (HCC): Primary | ICD-10-CM

## 2024-11-04 DIAGNOSIS — G89.29 CHRONIC PAIN OF RIGHT KNEE: ICD-10-CM

## 2024-11-04 DIAGNOSIS — E78.2 MIXED HYPERLIPIDEMIA: ICD-10-CM

## 2024-11-04 DIAGNOSIS — E66.01 CLASS 3 SEVERE OBESITY DUE TO EXCESS CALORIES WITH SERIOUS COMORBIDITY AND BODY MASS INDEX (BMI) OF 40.0 TO 44.9 IN ADULT: ICD-10-CM

## 2024-11-04 PROCEDURE — 99214 OFFICE O/P EST MOD 30 MIN: CPT | Performed by: STUDENT IN AN ORGANIZED HEALTH CARE EDUCATION/TRAINING PROGRAM

## 2024-11-04 PROCEDURE — 3074F SYST BP LT 130 MM HG: CPT | Performed by: STUDENT IN AN ORGANIZED HEALTH CARE EDUCATION/TRAINING PROGRAM

## 2024-11-04 PROCEDURE — 3079F DIAST BP 80-89 MM HG: CPT | Performed by: STUDENT IN AN ORGANIZED HEALTH CARE EDUCATION/TRAINING PROGRAM

## 2024-11-04 RX ORDER — ATORVASTATIN CALCIUM 40 MG/1
TABLET, FILM COATED ORAL
Qty: 90 TABLET | Refills: 0 | Status: SHIPPED | OUTPATIENT
Start: 2024-11-04

## 2024-11-04 RX ORDER — METFORMIN HYDROCHLORIDE 500 MG/1
1000 TABLET, EXTENDED RELEASE ORAL 2 TIMES DAILY WITH MEALS
Qty: 360 TABLET | Refills: 3 | Status: SHIPPED | OUTPATIENT
Start: 2024-11-04

## 2024-11-04 RX ORDER — GLIMEPIRIDE 2 MG/1
2 TABLET ORAL
Qty: 90 TABLET | Refills: 0 | Status: SHIPPED | OUTPATIENT
Start: 2024-11-04

## 2024-11-04 RX ORDER — OLMESARTAN MEDOXOMIL AND HYDROCHLOROTHIAZIDE 40/25 40; 25 MG/1; MG/1
1 TABLET ORAL DAILY
Qty: 30 TABLET | Refills: 2 | Status: SHIPPED | OUTPATIENT
Start: 2024-11-04

## 2024-11-04 RX ORDER — ORAL SEMAGLUTIDE 14 MG/1
1 TABLET ORAL
Qty: 90 TABLET | Refills: 1 | Status: SHIPPED | OUTPATIENT
Start: 2024-11-04

## 2024-11-04 RX ORDER — METOPROLOL SUCCINATE 50 MG/1
50 TABLET, EXTENDED RELEASE ORAL EVERY EVENING
Qty: 30 TABLET | Refills: 2 | Status: SHIPPED | OUTPATIENT
Start: 2024-11-04

## 2024-11-04 SDOH — ECONOMIC STABILITY: FOOD INSECURITY: WITHIN THE PAST 12 MONTHS, THE FOOD YOU BOUGHT JUST DIDN'T LAST AND YOU DIDN'T HAVE MONEY TO GET MORE.: NEVER TRUE

## 2024-11-04 SDOH — ECONOMIC STABILITY: FOOD INSECURITY: WITHIN THE PAST 12 MONTHS, YOU WORRIED THAT YOUR FOOD WOULD RUN OUT BEFORE YOU GOT MONEY TO BUY MORE.: NEVER TRUE

## 2024-11-04 SDOH — ECONOMIC STABILITY: INCOME INSECURITY: HOW HARD IS IT FOR YOU TO PAY FOR THE VERY BASICS LIKE FOOD, HOUSING, MEDICAL CARE, AND HEATING?: NOT HARD AT ALL

## 2024-11-04 ASSESSMENT — PATIENT HEALTH QUESTIONNAIRE - PHQ9
SUM OF ALL RESPONSES TO PHQ9 QUESTIONS 1 & 2: 0
2. FEELING DOWN, DEPRESSED OR HOPELESS: NOT AT ALL
SUM OF ALL RESPONSES TO PHQ QUESTIONS 1-9: 0
1. LITTLE INTEREST OR PLEASURE IN DOING THINGS: NOT AT ALL
SUM OF ALL RESPONSES TO PHQ QUESTIONS 1-9: 0

## 2024-11-04 NOTE — ASSESSMENT & PLAN NOTE
BMI in office today 43.26. Provided counseling on lifestyle intervention - establishing healthier eating habits and regular exercise routine; Goal: 5% weight loss, F/U 6 months. Educational Material provided.

## 2024-11-04 NOTE — PROGRESS NOTES
Elsa Mosley a 54 y.o. female  has a past medical history of Altered mental status, unspecified, Breast lesion on mammography, Diabetes (HCC), Heart murmur, Hypertension, Ill-defined condition, and Syncope and collapse. presents to office today for HTN follow up    Subjective:    Knee Pain, Right   -Patient complains of right knee pain.  She notes that she injured her knee earlier this year in March, during her injury she heard a popping sensation.  She notes that she consulted with [facility] and completed 3 months of physical therapy.  Patient states that she was prescribed meloxicam however it did not help to alleviate her symptoms.  She notes that she experiences severe pain with walking.  She notes that her knee feels stiff in the morning.  She states that she has been applying topical Aspercreme and taking Tylenol arthritis to help alleviate her symptoms.  She notes that she has to wake up an hour early to ensure that she is able to get dressed and time.  Patient notes that she does ambulate with a cane, she notes without it it feels like her knee is going to give out and she is going to fall.  She notes that she has to take 12 steps to get into her home, she currently lives in a [facility] and states that her bathroom and bedroom are located on the second floor.  She has been wearing good supportive shoes, sketchers, for additional stability.  She notes that associated symptoms includes feet and ankle swelling.  She describes the pain as a pressure sensation.  In addition, she notes that she feels a sharp numbness along the lateral aspect of her right calf, which is intermittent.  She notes that walking prolonged distances exacerbates her pain symptoms.  She reports that her knee pain is associated with morning crepitus.    Hypertension  - current meds: metoprolol, olmesartan-hctz  - take as prescribed: yes  - home readings: stable   - excess sodium consumption: she limits   - etOH use, nicotine

## 2024-11-04 NOTE — ASSESSMENT & PLAN NOTE
Knee injury 3/2024. [facility] and completed 3 months of PT. Ongoing right knee pain. Mild symptomatic relief with Aspercreme and Tylenol Arthritis. Will start diclofenac gel, tid. DMV disabled parking placard for 6 months completed today. Follow up in 3 months. In the event she continues to have pain, we discussed repeat imaging with possible repeat PT.

## 2024-11-04 NOTE — ASSESSMENT & PLAN NOTE
Continue current medication regimen. No new myalgias or GI upset on current medications. Medication compliance: Yes. Patient is following a low fat, low cholesterol diet. Encouraged regular exercise.

## 2024-11-04 NOTE — PATIENT INSTRUCTIONS
Your medications have been refilled     Return to office to  your DMV disable parking placard, continue using your cane     Voltaren gel has been sent to your pharmacy for your knee pain

## 2024-11-04 NOTE — ASSESSMENT & PLAN NOTE
- BP in office today 127/85  - At goal BP < 140/90, continue current BP medication regimen, RFT check

## 2024-11-04 NOTE — ASSESSMENT & PLAN NOTE
Most recent A1c 8.4% (11/2023).  A1c collected today.  Continue current diabetic medication regimen, refills sent to pharm.  Up-to-date on eye exam.  Lifestyle modification encouraged with regular exercise and weight loss.  Follow-up in 3 months with repeat A1c.

## 2024-11-04 NOTE — PROGRESS NOTES
Elsa Mosley is a 54 y.o. female     Chief Complaint   Patient presents with    Establish Care       /85 (Site: Left Upper Arm, Position: Sitting, Cuff Size: Medium Adult)   Pulse 68   Temp 98.5 °F (36.9 °C) (Oral)   Resp 16   Ht 1.702 m (5' 7\")   Wt 125.3 kg (276 lb 3.2 oz)   SpO2 95%   BMI 43.26 kg/m²     Health Maintenance Due   Topic Date Due    Diabetic retinal exam  Never done    Hepatitis B vaccine (1 of 3 - 19+ 3-dose series) Never done    Cervical cancer screen  Never done    DTaP/Tdap/Td vaccine (2 - Tdap) 12/23/2021    Diabetic foot exam  04/04/2024    Diabetic Alb to Cr ratio (uACR) test  04/04/2024    Lipids  04/04/2024    Depression Screen  07/25/2024    A1C test (Diabetic or Prediabetic)  11/13/2024    GFR test (Diabetes, CKD 3-4, OR last GFR 15-59)  11/13/2024         \"Have you been to the ER, urgent care clinic since your last visit?  Hospitalized since your last visit?\"    NO    “Have you seen or consulted any other health care providers outside of [facility] since your last visit?”    NO        “Have you had a pap smear?”    YES - Where: [facility] Nurse/CMA to request most recent records if not in the chart    No cervical cancer screening on file

## 2024-11-05 LAB
ALBUMIN/CREAT UR: 10 MG/G CREAT (ref 0–29)
BASOPHILS # BLD AUTO: 0 X10E3/UL (ref 0–0.2)
BASOPHILS NFR BLD AUTO: 0 %
BUN SERPL-MCNC: 12 MG/DL (ref 6–24)
BUN/CREAT SERPL: 13 (ref 9–23)
CALCIUM SERPL-MCNC: 10 MG/DL (ref 8.7–10.2)
CHLORIDE SERPL-SCNC: 99 MMOL/L (ref 96–106)
CHOLEST SERPL-MCNC: 126 MG/DL (ref 100–199)
CO2 SERPL-SCNC: 25 MMOL/L (ref 20–29)
CREAT SERPL-MCNC: 0.9 MG/DL (ref 0.57–1)
CREAT UR-MCNC: 70 MG/DL
EGFRCR SERPLBLD CKD-EPI 2021: 75 ML/MIN/1.73
EOSINOPHIL # BLD AUTO: 0.1 X10E3/UL (ref 0–0.4)
EOSINOPHIL NFR BLD AUTO: 1 %
ERYTHROCYTE [DISTWIDTH] IN BLOOD BY AUTOMATED COUNT: 14.2 % (ref 11.7–15.4)
GLUCOSE SERPL-MCNC: 153 MG/DL (ref 70–99)
HBA1C MFR BLD: 11.9 % (ref 4.8–5.6)
HCT VFR BLD AUTO: 41.1 % (ref 34–46.6)
HDLC SERPL-MCNC: 37 MG/DL
HGB BLD-MCNC: 13.4 G/DL (ref 11.1–15.9)
IMM GRANULOCYTES # BLD AUTO: 0 X10E3/UL (ref 0–0.1)
IMM GRANULOCYTES NFR BLD AUTO: 0 %
LDLC SERPL CALC-MCNC: 69 MG/DL (ref 0–99)
LYMPHOCYTES # BLD AUTO: 2.2 X10E3/UL (ref 0.7–3.1)
LYMPHOCYTES NFR BLD AUTO: 23 %
MCH RBC QN AUTO: 26 PG (ref 26.6–33)
MCHC RBC AUTO-ENTMCNC: 32.6 G/DL (ref 31.5–35.7)
MCV RBC AUTO: 80 FL (ref 79–97)
MICROALBUMIN UR-MCNC: 7 UG/ML
MONOCYTES # BLD AUTO: 0.5 X10E3/UL (ref 0.1–0.9)
MONOCYTES NFR BLD AUTO: 5 %
NEUTROPHILS # BLD AUTO: 6.8 X10E3/UL (ref 1.4–7)
NEUTROPHILS NFR BLD AUTO: 71 %
PLATELET # BLD AUTO: 400 X10E3/UL (ref 150–450)
POTASSIUM SERPL-SCNC: 4.3 MMOL/L (ref 3.5–5.2)
RBC # BLD AUTO: 5.15 X10E6/UL (ref 3.77–5.28)
SODIUM SERPL-SCNC: 139 MMOL/L (ref 134–144)
TRIGL SERPL-MCNC: 108 MG/DL (ref 0–149)
VLDLC SERPL CALC-MCNC: 20 MG/DL (ref 5–40)
WBC # BLD AUTO: 9.6 X10E3/UL (ref 3.4–10.8)

## 2024-11-11 ENCOUNTER — OFFICE VISIT (OUTPATIENT)
Age: 54
End: 2024-11-11
Payer: COMMERCIAL

## 2024-11-11 VITALS
BODY MASS INDEX: 43.95 KG/M2 | DIASTOLIC BLOOD PRESSURE: 80 MMHG | SYSTOLIC BLOOD PRESSURE: 126 MMHG | HEART RATE: 78 BPM | HEIGHT: 67 IN | WEIGHT: 280 LBS | OXYGEN SATURATION: 99 %

## 2024-11-11 DIAGNOSIS — I10 PRIMARY HYPERTENSION: Primary | ICD-10-CM

## 2024-11-11 DIAGNOSIS — Z82.49 FAMILY HISTORY OF EARLY CAD: ICD-10-CM

## 2024-11-11 DIAGNOSIS — E78.5 DYSLIPIDEMIA: ICD-10-CM

## 2024-11-11 PROCEDURE — 3074F SYST BP LT 130 MM HG: CPT | Performed by: NURSE PRACTITIONER

## 2024-11-11 PROCEDURE — 3079F DIAST BP 80-89 MM HG: CPT | Performed by: NURSE PRACTITIONER

## 2024-11-11 PROCEDURE — 99214 OFFICE O/P EST MOD 30 MIN: CPT | Performed by: NURSE PRACTITIONER

## 2024-11-11 ASSESSMENT — PATIENT HEALTH QUESTIONNAIRE - PHQ9
SUM OF ALL RESPONSES TO PHQ QUESTIONS 1-9: 0
SUM OF ALL RESPONSES TO PHQ QUESTIONS 1-9: 0
SUM OF ALL RESPONSES TO PHQ9 QUESTIONS 1 & 2: 0
2. FEELING DOWN, DEPRESSED OR HOPELESS: NOT AT ALL
1. LITTLE INTEREST OR PLEASURE IN DOING THINGS: NOT AT ALL
SUM OF ALL RESPONSES TO PHQ QUESTIONS 1-9: 0
SUM OF ALL RESPONSES TO PHQ QUESTIONS 1-9: 0

## 2024-11-11 NOTE — PATIENT INSTRUCTIONS
A coronary calcium score CT scan is a noninvasive test that measures and quantifies the amount of calcium around the 3 coronary vessels that sit on top of the heart.  It does not diagnose by itself whether the coronary calcification is inside the  vessel causing a major blockage or whether its in the wall around the vessel.  It is important to not just look at the number but also the percentile which judges each patient compared to their age and gender.  For example if you are at the 80th percentile that means 20% of folks have a higher calcium score and 80% have a lower calcium score.  A low score is predictive of a very low risk of heart disease in the next 5 to 10 years.  We often recommend a coronary calcium score to be done twice between the ages of 40 and 70 though there is occasion where a patient younger or older may benefit.       The main question is what to do with the information and typically if it is very high we will do a stress test or if there are symptoms, a heart catheterization.  This test is often also helpful to help us decide how aggressive to be on cholesterol medication management with statins or other LDL lowering cholesterol medications.  It is important to continue to eat well ,such as the Mediterranean diet high in fish &  lean meats and a lot more vegetables than the average diet.  Avoid smoking, exercise at least 30 minutes 4 times a week.       The calcium score is a great tool for us to look at cardiovascular risk.  However be clear that it does not necessarily differentiate plaque inside the vessel versus calcium outside the vessel.  It does make it suspicious since often soft plaque inside the vessel will harden and become calcified and thus can lead to elevated calcium scores.     You should call [phone] to schedule this.  This test is not covered by insurance and will cost you approximately $129 this month.  Please let the imaging center know that you are a patient of [facility]

## 2024-11-11 NOTE — PROGRESS NOTES
1. Have you been to the ER, urgent care clinic since your last visit?  Hospitalized since your last visit?No    2. Have you seen or consulted any other health care providers outside of the [facility] since your last visit?  Include any pap smears or colon screening. No    
Reactions    Dexamethasone Other (See Comments) and Hives     Joint swelling, rash       /80 (Site: Right Upper Arm, Position: Sitting, Cuff Size: Large Adult)   Pulse 78   Ht 1.702 m (5' 7\")   Wt 127 kg (280 lb)   SpO2 99%   BMI 43.85 kg/m²     Wt Readings from Last 3 Encounters:   11/11/24 127 kg (280 lb)   11/04/24 125.3 kg (276 lb 3.2 oz)   07/15/24 132.5 kg (292 lb)     Review of Systems:   A comprehensive review of systems was negative.     Physical Exam  Neck:      Vascular: No carotid bruit.   Cardiovascular:      Rate and Rhythm: Regular rhythm.      Heart sounds: Normal heart sounds.   Pulmonary:      Effort: Pulmonary effort is normal.      Breath sounds: Normal breath sounds.   Abdominal:      Palpations: Abdomen is soft.   Musculoskeletal:      Right lower leg: No edema.      Left lower leg: No edema.          Current Outpatient Medications   Medication Sig Dispense Refill    atorvastatin (LIPITOR) 40 MG tablet TAKE 1 TABLET BY MOUTH DAILY. TAKE AT BEDTIME 90 tablet 0    glimepiride (AMARYL) 2 MG tablet Take 1 tablet by mouth every morning (before breakfast) 90 tablet 0    metFORMIN (GLUCOPHAGE-XR) 500 MG extended release tablet Take 2 tablets by mouth with breakfast and with evening meal 360 tablet 3    metoprolol succinate (TOPROL XL) 50 MG extended release tablet Take 1 tablet by mouth every evening 30 tablet 2    olmesartan-hydroCHLOROthiazide (BENICAR HCT) 40-25 MG per tablet Take 1 tablet by mouth daily 30 tablet 2    Semaglutide (RYBELSUS) 14 MG TABS Take 1 tablet by mouth every morning (before breakfast) 90 tablet 1    diclofenac sodium (VOLTAREN) 1 % GEL Apply 4 g topically 4 times daily 100 g 3    Lancets MISC Use to check blood sugar once daily Dx: E11.65      levonorgestrel (MIRENA) IUD 52 mg 1 Device by IntraUTERine route once       No current facility-administered medications for this visit.        Lab Results   Component Value Date/Time    CHOL 126 11/04/2024 12:00 AM    HDL 37 
WDL

## 2024-11-27 ENCOUNTER — HOSPITAL ENCOUNTER (OUTPATIENT)
Age: 54
Discharge: HOME | End: 2024-11-30

## 2024-11-27 DIAGNOSIS — Z82.49 FAMILY HISTORY OF EARLY CAD: ICD-10-CM

## 2024-11-27 DIAGNOSIS — E78.5 DYSLIPIDEMIA: ICD-10-CM

## 2024-11-27 DIAGNOSIS — I10 PRIMARY HYPERTENSION: ICD-10-CM

## 2024-11-27 PROCEDURE — 75571 CT HRT W/O DYE W/CA TEST: CPT

## 2024-11-29 ENCOUNTER — TELEPHONE (OUTPATIENT)
Age: 54
End: 2024-11-29

## 2024-11-29 DIAGNOSIS — E78.2 MIXED HYPERLIPIDEMIA: ICD-10-CM

## 2024-11-29 RX ORDER — ATORVASTATIN CALCIUM 80 MG/1
TABLET, FILM COATED ORAL
Qty: 90 TABLET | Refills: 1 | Status: SHIPPED | OUTPATIENT
Start: 2024-11-29

## 2024-11-29 NOTE — TELEPHONE ENCOUNTER
----- Message from LUCAS Kearney NP sent at 11/28/2024  7:39 AM EST -----  Please let her know that her calcium scan showed some plaque in her heart arteries but we know it's not enough to block them and obstruct blood flow due to her recent stress test.  We do need to be more aggressive about her cholesterol to prevent progression.  LDL goal for her is <55 due to her diabetes.  Please advise her to increase her atorvastatin to 80mg nightly and increase her exercise. She will need lipids rechecked in 3-4 months.  Future Appointments  2/4/2025   4:00 PM    [facility]  5/14/2025  3:20 PM    Lee Ernandez MD [facility]     pacu to home

## 2024-11-29 NOTE — TELEPHONE ENCOUNTER
Identifiers x 2.  Informed of NP recommendations. Verbalized understanding.     Requested Prescriptions     Signed Prescriptions Disp Refills    atorvastatin (LIPITOR) 80 MG tablet 90 tablet 1     Sig: TAKE 1 TABLET BY MOUTH DAILY. TAKE AT BEDTIME (dose increased to 80 mg nightly on 11-29-24. Inform patient that this is a 80 mg tablet)     Authorizing Provider: COTY SPICER     Ordering User: VITA RUSS     Written order per ALEN Spicer NP.

## 2024-12-31 DIAGNOSIS — E78.2 MIXED HYPERLIPIDEMIA: ICD-10-CM

## 2024-12-31 NOTE — TELEPHONE ENCOUNTER
PCP: Cece Dove MD    Last appt: Visit date not found    Future Appointments   Date Time Provider Department Center   2/4/2025  4:00 PM Cece Dove MD [facility]   5/14/2025  3:20 PM Lee Ernandez MD [facility]       Requested Prescriptions     Pending Prescriptions Disp Refills    atorvastatin (LIPITOR) 40 MG tablet [Pharmacy Med Name: ATORVASTATIN 40 MG TABLET] 90 tablet 1     Sig: TAKE 1 TABLET BY MOUTH DAILY. TAKE AT BEDTIME

## 2025-01-02 RX ORDER — ATORVASTATIN CALCIUM 40 MG/1
TABLET, FILM COATED ORAL
Qty: 90 TABLET | Refills: 1 | OUTPATIENT
Start: 2025-01-02

## 2025-02-01 DIAGNOSIS — E11.65 UNCONTROLLED TYPE 2 DIABETES MELLITUS WITH HYPERGLYCEMIA (HCC): ICD-10-CM

## 2025-02-03 RX ORDER — ORAL SEMAGLUTIDE 14 MG/1
1 TABLET ORAL
Qty: 90 TABLET | Refills: 1 | Status: SHIPPED | OUTPATIENT
Start: 2025-02-03

## 2025-02-03 NOTE — TELEPHONE ENCOUNTER
PCP: Cece Dove MD    Last appt: 11/4/2024    Future Appointments   Date Time Provider Department Center   2/4/2025  4:00 PM Cece Dove MD [facility]   5/14/2025  3:20 PM Lee Ernandez MD [facility]       Requested Prescriptions     Pending Prescriptions Disp Refills    RYBELSUS 14 MG TABS [Pharmacy Med Name: RYBELSUS 14 MG TABLET] 90 tablet 1     Sig: TAKE 1 TABLET BY MOUTH EVERY DAY BEFORE BREAKFAST

## 2025-02-04 ENCOUNTER — OFFICE VISIT (OUTPATIENT)
Facility: CLINIC | Age: 55
End: 2025-02-04
Payer: COMMERCIAL

## 2025-02-04 VITALS
WEIGHT: 271.4 LBS | RESPIRATION RATE: 14 BRPM | HEIGHT: 67 IN | OXYGEN SATURATION: 96 % | HEART RATE: 92 BPM | SYSTOLIC BLOOD PRESSURE: 122 MMHG | BODY MASS INDEX: 42.6 KG/M2 | DIASTOLIC BLOOD PRESSURE: 83 MMHG

## 2025-02-04 DIAGNOSIS — M25.561 CHRONIC PAIN OF RIGHT KNEE: ICD-10-CM

## 2025-02-04 DIAGNOSIS — E11.65 UNCONTROLLED TYPE 2 DIABETES MELLITUS WITH HYPERGLYCEMIA (HCC): Primary | ICD-10-CM

## 2025-02-04 DIAGNOSIS — G89.29 CHRONIC PAIN OF RIGHT KNEE: ICD-10-CM

## 2025-02-04 DIAGNOSIS — E66.01 CLASS 3 SEVERE OBESITY DUE TO EXCESS CALORIES WITH SERIOUS COMORBIDITY AND BODY MASS INDEX (BMI) OF 40.0 TO 44.9 IN ADULT: ICD-10-CM

## 2025-02-04 DIAGNOSIS — E66.813 CLASS 3 SEVERE OBESITY DUE TO EXCESS CALORIES WITH SERIOUS COMORBIDITY AND BODY MASS INDEX (BMI) OF 40.0 TO 44.9 IN ADULT: ICD-10-CM

## 2025-02-04 DIAGNOSIS — E78.2 MIXED HYPERLIPIDEMIA: ICD-10-CM

## 2025-02-04 PROCEDURE — 3052F HG A1C>EQUAL 8.0%<EQUAL 9.0%: CPT | Performed by: STUDENT IN AN ORGANIZED HEALTH CARE EDUCATION/TRAINING PROGRAM

## 2025-02-04 PROCEDURE — 3074F SYST BP LT 130 MM HG: CPT | Performed by: STUDENT IN AN ORGANIZED HEALTH CARE EDUCATION/TRAINING PROGRAM

## 2025-02-04 PROCEDURE — 3079F DIAST BP 80-89 MM HG: CPT | Performed by: STUDENT IN AN ORGANIZED HEALTH CARE EDUCATION/TRAINING PROGRAM

## 2025-02-04 PROCEDURE — 99214 OFFICE O/P EST MOD 30 MIN: CPT | Performed by: STUDENT IN AN ORGANIZED HEALTH CARE EDUCATION/TRAINING PROGRAM

## 2025-02-04 ASSESSMENT — PATIENT HEALTH QUESTIONNAIRE - PHQ9
1. LITTLE INTEREST OR PLEASURE IN DOING THINGS: NOT AT ALL
SUM OF ALL RESPONSES TO PHQ QUESTIONS 1-9: 0
SUM OF ALL RESPONSES TO PHQ QUESTIONS 1-9: 0
1. LITTLE INTEREST OR PLEASURE IN DOING THINGS: NOT AT ALL
SUM OF ALL RESPONSES TO PHQ QUESTIONS 1-9: 0
2. FEELING DOWN, DEPRESSED OR HOPELESS: NOT AT ALL
SUM OF ALL RESPONSES TO PHQ9 QUESTIONS 1 & 2: 0
2. FEELING DOWN, DEPRESSED OR HOPELESS: NOT AT ALL
SUM OF ALL RESPONSES TO PHQ9 QUESTIONS 1 & 2: 0
SUM OF ALL RESPONSES TO PHQ QUESTIONS 1-9: 0

## 2025-02-04 NOTE — PROGRESS NOTES
\"Have you been to the ER, urgent care clinic since your last visit?  Hospitalized since your last visit?\"    NO    “Have you seen or consulted any other health care providers outside our system since your last visit?”    NO     “Have you had a pap smear?”    NO    No cervical cancer screening on file       “Have you had a diabetic eye exam?”    NO     No diabetic eye exam on file

## 2025-02-05 LAB
ALBUMIN SERPL-MCNC: 4.4 G/DL (ref 3.8–4.9)
ALP SERPL-CCNC: 136 IU/L (ref 44–121)
ALT SERPL-CCNC: 27 IU/L (ref 0–32)
AST SERPL-CCNC: 17 IU/L (ref 0–40)
BILIRUB DIRECT SERPL-MCNC: 0.22 MG/DL (ref 0–0.4)
BILIRUB SERPL-MCNC: 0.6 MG/DL (ref 0–1.2)
BUN SERPL-MCNC: 19 MG/DL (ref 6–24)
BUN/CREAT SERPL: 17 (ref 9–23)
CALCIUM SERPL-MCNC: 10.4 MG/DL (ref 8.7–10.2)
CHLORIDE SERPL-SCNC: 104 MMOL/L (ref 96–106)
CHOLEST SERPL-MCNC: 134 MG/DL (ref 100–199)
CO2 SERPL-SCNC: 24 MMOL/L (ref 20–29)
CREAT SERPL-MCNC: 1.09 MG/DL (ref 0.57–1)
EGFRCR SERPLBLD CKD-EPI 2021: 60 ML/MIN/1.73
GLUCOSE SERPL-MCNC: 75 MG/DL (ref 70–99)
HBA1C MFR BLD: 8.5 % (ref 4.8–5.6)
HDLC SERPL-MCNC: 37 MG/DL
LDLC SERPL CALC-MCNC: 75 MG/DL (ref 0–99)
POTASSIUM SERPL-SCNC: 4.1 MMOL/L (ref 3.5–5.2)
PROT SERPL-MCNC: 7.6 G/DL (ref 6–8.5)
SODIUM SERPL-SCNC: 143 MMOL/L (ref 134–144)
TRIGL SERPL-MCNC: 122 MG/DL (ref 0–149)
VLDLC SERPL CALC-MCNC: 22 MG/DL (ref 5–40)

## 2025-02-06 LAB
ALBUMIN/CREAT UR: <3 MG/G CREAT (ref 0–29)
CREAT UR-MCNC: 100 MG/DL
MICROALBUMIN UR-MCNC: <3 UG/ML

## 2025-02-06 NOTE — ASSESSMENT & PLAN NOTE
Most recent A1c 11.9% ( 11/5/2024). Uncontrolled. A1c collected today.  Continue current diabetic medication regimen.  Up-to-date on eye exam.  Lifestyle modification encouraged with regular exercise and weight loss.  Follow-up in 3 months with repeat A1c.

## 2025-02-06 NOTE — ASSESSMENT & PLAN NOTE
Knee injury 3/2024. She consulted with [facility] and completed 3 months of PT. Ongoing right knee pain improved with Voltaren gel, refill sent to pharmacy.

## 2025-02-06 NOTE — ASSESSMENT & PLAN NOTE
BMI in office today 42.51, decreased from 43.26, I congratulated her. Provided counseling on lifestyle intervention - establishing healthier eating habits and regular exercise routine; Goal: 5% weight loss. Educational Material provided.

## 2025-02-06 NOTE — ASSESSMENT & PLAN NOTE
Continue current medication regimen, lipitor. No new myalgias or GI upset on current medications. Medication compliance: Yes. Patient is following a low fat, low cholesterol diet. Encouraged regular exercise.

## 2025-02-10 ENCOUNTER — TELEPHONE (OUTPATIENT)
Age: 55
End: 2025-02-10

## 2025-02-10 DIAGNOSIS — E78.2 MIXED HYPERLIPIDEMIA: Primary | ICD-10-CM

## 2025-02-10 NOTE — TELEPHONE ENCOUNTER
----- Message from LUCAS Kearney NP sent at 2/9/2025 10:30 AM EST -----  Please call patient.  LDL is 75 and due to her CAD and DM it needs to be less than 55.  I would like to add zetia 10mg daily to her lipid therapy, she will continue statin.  If she is agreeable to this plan please send Rx to her pharmacy. She will need fasting lipids checked prior to visit with Dr. Ernandez in May.    Future Appointments  5/6/2025   4:00 PM    Cece Dove MD         [facility] DEP  5/14/2025  3:20 PM    [facility]              [facility]

## 2025-02-10 NOTE — TELEPHONE ENCOUNTER
.Attempted to reach patient by telephone. A message was left for return call.       Wix message sent and read by patient.    Requested Prescriptions     Signed Prescriptions Disp Refills    ezetimibe (ZETIA) 10 MG tablet 90 tablet 1     Sig: Take 1 tablet by mouth daily     Authorizing Provider: COTY SPICER     Ordering User: VITA RUSS     Written order per ALEN Spicer NP.       Detail Level: Zone Render In Strict Bullet Format?: No Continue Regimen: Accutane 40 mg BID

## 2025-02-11 ENCOUNTER — TELEPHONE (OUTPATIENT)
Age: 55
End: 2025-02-11

## 2025-02-11 NOTE — TELEPHONE ENCOUNTER
Patient returning call to nurse when you get a opportunity please follow up at [phone] and I reminded patient to review [employer] message as well

## 2025-02-12 RX ORDER — EZETIMIBE 10 MG/1
10 TABLET ORAL DAILY
Qty: 90 TABLET | Refills: 1 | Status: SHIPPED | OUTPATIENT
Start: 2025-02-12

## 2025-02-12 NOTE — TELEPHONE ENCOUNTER
Identifiers x 2. Informed patient that zetia had been sent to pharmacy.  Discussed need for lab results, 1 week prior to follow up in May/2025. Verbalized understanding.

## 2025-02-17 ENCOUNTER — TRANSCRIBE ORDERS (OUTPATIENT)
Facility: HOSPITAL | Age: 55
End: 2025-02-17

## 2025-02-17 DIAGNOSIS — Z12.31 OTHER SCREENING MAMMOGRAM: Primary | ICD-10-CM

## 2025-02-23 ENCOUNTER — OFFICE VISIT (OUTPATIENT)
Age: 55
End: 2025-02-23

## 2025-02-23 VITALS
HEART RATE: 68 BPM | BODY MASS INDEX: 46.36 KG/M2 | TEMPERATURE: 98.2 F | DIASTOLIC BLOOD PRESSURE: 86 MMHG | SYSTOLIC BLOOD PRESSURE: 140 MMHG | WEIGHT: 293 LBS | RESPIRATION RATE: 16 BRPM | OXYGEN SATURATION: 97 %

## 2025-02-23 DIAGNOSIS — B96.89 ACUTE BACTERIAL SINUSITIS: Primary | ICD-10-CM

## 2025-02-23 DIAGNOSIS — J01.90 ACUTE BACTERIAL SINUSITIS: Primary | ICD-10-CM

## 2025-03-03 DIAGNOSIS — I10 PRIMARY HYPERTENSION: ICD-10-CM

## 2025-03-04 RX ORDER — OLMESARTAN MEDOXOMIL AND HYDROCHLOROTHIAZIDE 40/25 40; 25 MG/1; MG/1
1 TABLET ORAL DAILY
Qty: 90 TABLET | Refills: 1 | Status: SHIPPED | OUTPATIENT
Start: 2025-03-04

## 2025-03-07 DIAGNOSIS — I10 PRIMARY HYPERTENSION: ICD-10-CM

## 2025-03-07 NOTE — TELEPHONE ENCOUNTER
PCP: Cece Dove MD    Last appt: 2/4/2025    Future Appointments   Date Time Provider Department Center   4/2/2025 10:00 AM [address]   5/6/2025  4:00 PM Cece Dove MD [facility]   5/14/2025  3:20 PM Lee Ernandez MD [facility]       Requested Prescriptions     Pending Prescriptions Disp Refills    metoprolol succinate (TOPROL XL) 50 MG extended release tablet [Pharmacy Med Name: METOPROLOL SUCC ER 50 MG TAB] 30 tablet 2     Sig: TAKE 1 TABLET BY MOUTH EVERY DAY IN THE EVENING

## 2025-03-10 RX ORDER — METOPROLOL SUCCINATE 50 MG/1
50 TABLET, EXTENDED RELEASE ORAL EVERY EVENING
Qty: 90 TABLET | Refills: 1 | Status: SHIPPED | OUTPATIENT
Start: 2025-03-10

## 2025-04-03 ENCOUNTER — RESULTS FOLLOW-UP (OUTPATIENT)
Facility: CLINIC | Age: 55
End: 2025-04-03

## 2025-04-03 NOTE — RESULT ENCOUNTER NOTE
Please notify patient.  Mixed hyperlipidemia noted, lifestyle modification encouraged with routine aerobic exercise and dietary modification.    Renal function diminished, will repeat during next visit, ensure adequate hydration with water.   A1c improved, continue to cut back on sweets/starches.   Keep scheduled follow up 5/6/25 for repeat labs.   No change in current management/meds.

## 2025-04-12 DIAGNOSIS — E78.2 MIXED HYPERLIPIDEMIA: ICD-10-CM

## 2025-04-22 DIAGNOSIS — E11.65 UNCONTROLLED TYPE 2 DIABETES MELLITUS WITH HYPERGLYCEMIA (HCC): ICD-10-CM

## 2025-04-22 NOTE — TELEPHONE ENCOUNTER
PCP: Cece Dove MD    Last appt: 2/4/2025    Future Appointments   Date Time Provider Department Center   5/6/2025  4:00 PM Cece Dove MD [facility]   5/14/2025  3:20 PM Lee Ernandez MD [facility]       Requested Prescriptions     Pending Prescriptions Disp Refills    glimepiride (AMARYL) 2 MG tablet [Pharmacy Med Name: GLIMEPIRIDE 2 MG TABLET] 90 tablet 0     Sig: TAKE 1 TABLET BY MOUTH EVERY DAY BEFORE BREAKFAST

## 2025-04-24 RX ORDER — GLIMEPIRIDE 2 MG/1
2 TABLET ORAL
Qty: 90 TABLET | Refills: 1 | Status: SHIPPED | OUTPATIENT
Start: 2025-04-24

## 2025-04-25 LAB
ALBUMIN SERPL-MCNC: 4.3 G/DL (ref 3.8–4.9)
ALP SERPL-CCNC: 135 IU/L (ref 44–121)
ALT SERPL-CCNC: 23 IU/L (ref 0–32)
AST SERPL-CCNC: 19 IU/L (ref 0–40)
BILIRUB DIRECT SERPL-MCNC: 0.25 MG/DL (ref 0–0.4)
BILIRUB SERPL-MCNC: 0.7 MG/DL (ref 0–1.2)
CHOLEST SERPL-MCNC: 99 MG/DL (ref 100–199)
HDLC SERPL-MCNC: 32 MG/DL
IMP & REVIEW OF LAB RESULTS: NORMAL
LDLC SERPL CALC-MCNC: 51 MG/DL (ref 0–99)
PROT SERPL-MCNC: 7.3 G/DL (ref 6–8.5)
TRIGL SERPL-MCNC: 77 MG/DL (ref 0–149)
VLDLC SERPL CALC-MCNC: 16 MG/DL (ref 5–40)

## 2025-05-06 ENCOUNTER — OFFICE VISIT (OUTPATIENT)
Facility: CLINIC | Age: 55
End: 2025-05-06
Payer: COMMERCIAL

## 2025-05-06 VITALS
HEART RATE: 95 BPM | OXYGEN SATURATION: 96 % | DIASTOLIC BLOOD PRESSURE: 76 MMHG | WEIGHT: 268 LBS | BODY MASS INDEX: 41.97 KG/M2 | SYSTOLIC BLOOD PRESSURE: 115 MMHG

## 2025-05-06 DIAGNOSIS — E11.65 UNCONTROLLED TYPE 2 DIABETES MELLITUS WITH HYPERGLYCEMIA (HCC): Primary | ICD-10-CM

## 2025-05-06 DIAGNOSIS — I10 PRIMARY HYPERTENSION: ICD-10-CM

## 2025-05-06 DIAGNOSIS — E66.01 MORBID OBESITY WITH BODY MASS INDEX (BMI) OF 40.0 TO 49.9 (HCC): ICD-10-CM

## 2025-05-06 DIAGNOSIS — M17.0 PRIMARY OSTEOARTHRITIS OF BOTH KNEES: ICD-10-CM

## 2025-05-06 DIAGNOSIS — E78.2 MIXED HYPERLIPIDEMIA: ICD-10-CM

## 2025-05-06 PROCEDURE — 3074F SYST BP LT 130 MM HG: CPT | Performed by: STUDENT IN AN ORGANIZED HEALTH CARE EDUCATION/TRAINING PROGRAM

## 2025-05-06 PROCEDURE — 3052F HG A1C>EQUAL 8.0%<EQUAL 9.0%: CPT | Performed by: STUDENT IN AN ORGANIZED HEALTH CARE EDUCATION/TRAINING PROGRAM

## 2025-05-06 PROCEDURE — 3078F DIAST BP <80 MM HG: CPT | Performed by: STUDENT IN AN ORGANIZED HEALTH CARE EDUCATION/TRAINING PROGRAM

## 2025-05-06 PROCEDURE — 99214 OFFICE O/P EST MOD 30 MIN: CPT | Performed by: STUDENT IN AN ORGANIZED HEALTH CARE EDUCATION/TRAINING PROGRAM

## 2025-05-06 SDOH — ECONOMIC STABILITY: FOOD INSECURITY: WITHIN THE PAST 12 MONTHS, THE FOOD YOU BOUGHT JUST DIDN'T LAST AND YOU DIDN'T HAVE MONEY TO GET MORE.: NEVER TRUE

## 2025-05-06 SDOH — ECONOMIC STABILITY: FOOD INSECURITY: WITHIN THE PAST 12 MONTHS, YOU WORRIED THAT YOUR FOOD WOULD RUN OUT BEFORE YOU GOT MONEY TO BUY MORE.: NEVER TRUE

## 2025-05-06 ASSESSMENT — PATIENT HEALTH QUESTIONNAIRE - PHQ9
SUM OF ALL RESPONSES TO PHQ QUESTIONS 1-9: 0
1. LITTLE INTEREST OR PLEASURE IN DOING THINGS: NOT AT ALL
2. FEELING DOWN, DEPRESSED OR HOPELESS: NOT AT ALL
SUM OF ALL RESPONSES TO PHQ QUESTIONS 1-9: 0

## 2025-05-06 NOTE — PROGRESS NOTES
Chief Complaint   Patient presents with    3 Month Follow-Up    Diabetes         Health Maintenance Due   Topic Date Due    Diabetic retinal exam  Never done    Hepatitis B vaccine (1 of 3 - 19+ 3-dose series) Never done    Cervical cancer screen  Never done    DTaP/Tdap/Td vaccine (2 - Tdap) 12/23/2021    Diabetic foot exam  04/04/2024         \"Have you been to the ER, urgent care clinic since your last visit?  Hospitalized since your last visit?\"    NO    “Have you seen or consulted any other health care providers outside of [facility] since your last visit?”    NO        “Have you had a pap smear?”    10/2024    No cervical cancer screening on file

## 2025-05-06 NOTE — PROGRESS NOTES
Elsa Mosley a 54 y.o. female  has a past medical history of Altered mental status, unspecified, Breast lesion on mammography, Diabetes (HCC), Heart murmur, Hypertension, Ill-defined condition, and Syncope and collapse. presents to office today for DM follow up.     Subjective:    History of Present Illness    She reports a deterioration in her mobility due to bilateral knee pain, which she describes as severe enough to induce tears. 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 These activities have exacerbated her knee pain. She has experienced weight loss since February 2025 but has been unable to engage in any physical activity due to the severity of her pain. She also reports auditory sensations of bone cracking. On 04/26/2025, she sought emergency care at [facility], where she was referred to a joint specialist within their practice. She is scheduled to see Dr. Winter  on 05/19/2025. An x-ray of her knees revealed moderate-to-severe changes on the left side with a bone spur and severe degenerative changes with bone spurs on the right side. A comparison was made between an x-ray taken 10 years ago and another taken in March 2024 following a fall down the stairs, during which her right knee popped. The most recent x-ray showed a progression of her condition. She has not considered using a wheelchair. Ambulates with a cane. She had a consultation with Denny Bellamy on 04/24/2025, who did not prescribe any medication due to potential cardiac implications. Instead, she was advised to continue using the previously prescribed cream and to take Tylenol Arthritis. No steroid injection was recommended at that time. She has declined physical therapy. She has previously received steroid injections for her condition.    She is currently taking Lipitor and Zetia for cholesterol

## 2025-05-07 LAB
ALBUMIN SERPL-MCNC: 3.9 G/DL (ref 3.5–5)
ALBUMIN/GLOB SERPL: 0.8 (ref 1.1–2.2)
ALP SERPL-CCNC: 136 U/L (ref 45–117)
ALT SERPL-CCNC: 44 U/L (ref 12–78)
ANION GAP SERPL CALC-SCNC: 6 MMOL/L (ref 2–12)
AST SERPL-CCNC: 21 U/L (ref 15–37)
BILIRUB SERPL-MCNC: 1.1 MG/DL (ref 0.2–1)
BUN SERPL-MCNC: 16 MG/DL (ref 6–20)
BUN/CREAT SERPL: 14 (ref 12–20)
CALCIUM SERPL-MCNC: 10.4 MG/DL (ref 8.5–10.1)
CHLORIDE SERPL-SCNC: 101 MMOL/L (ref 97–108)
CO2 SERPL-SCNC: 30 MMOL/L (ref 21–32)
CREAT SERPL-MCNC: 1.13 MG/DL (ref 0.55–1.02)
EST. AVERAGE GLUCOSE BLD GHB EST-MCNC: 220 MG/DL
GLOBULIN SER CALC-MCNC: 4.7 G/DL (ref 2–4)
GLUCOSE SERPL-MCNC: 169 MG/DL (ref 65–100)
HBA1C MFR BLD: 9.3 % (ref 4–5.6)
POTASSIUM SERPL-SCNC: 3.6 MMOL/L (ref 3.5–5.1)
PROT SERPL-MCNC: 8.6 G/DL (ref 6.4–8.2)
SODIUM SERPL-SCNC: 137 MMOL/L (ref 136–145)

## 2025-05-09 ENCOUNTER — HOSPITAL ENCOUNTER (OUTPATIENT)
Facility: HOSPITAL | Age: 55
Discharge: HOME | End: 2025-05-12
Attending: STUDENT IN AN ORGANIZED HEALTH CARE EDUCATION/TRAINING PROGRAM
Payer: COMMERCIAL

## 2025-05-09 VITALS — HEIGHT: 67 IN | BODY MASS INDEX: 42.06 KG/M2 | WEIGHT: 268 LBS

## 2025-05-09 DIAGNOSIS — Z12.31 OTHER SCREENING MAMMOGRAM: ICD-10-CM

## 2025-05-09 PROCEDURE — 77063 BREAST TOMOSYNTHESIS BI: CPT

## 2025-05-13 ENCOUNTER — RESULTS FOLLOW-UP (OUTPATIENT)
Facility: CLINIC | Age: 55
End: 2025-05-13

## 2025-05-13 DIAGNOSIS — E11.65 UNCONTROLLED TYPE 2 DIABETES MELLITUS WITH HYPERGLYCEMIA (HCC): ICD-10-CM

## 2025-05-13 RX ORDER — GLIMEPIRIDE 4 MG/1
4 TABLET ORAL
Qty: 90 TABLET | Refills: 0 | Status: SHIPPED | OUTPATIENT
Start: 2025-05-13

## 2025-05-13 NOTE — RESULT ENCOUNTER NOTE
Please notify patient.  No mammographic evidence of malignancy.  RECOMMENDATIONS:  Next screening mammogram is recommended in one year.

## 2025-05-13 NOTE — RESULT ENCOUNTER NOTE
Please notify patient.  A1c increased from 8.5 to 9.3.   Recommendations to increase glimepiride from 2mg daily to 4mg daily; new rx sent to pharm.

## 2025-05-14 ENCOUNTER — OFFICE VISIT (OUTPATIENT)
Age: 55
End: 2025-05-14
Payer: COMMERCIAL

## 2025-05-14 VITALS
OXYGEN SATURATION: 99 % | DIASTOLIC BLOOD PRESSURE: 80 MMHG | HEART RATE: 87 BPM | BODY MASS INDEX: 43.16 KG/M2 | SYSTOLIC BLOOD PRESSURE: 132 MMHG | WEIGHT: 275 LBS | HEIGHT: 67 IN

## 2025-05-14 DIAGNOSIS — I10 PRIMARY HYPERTENSION: ICD-10-CM

## 2025-05-14 DIAGNOSIS — R07.89 OTHER CHEST PAIN: ICD-10-CM

## 2025-05-14 DIAGNOSIS — R06.02 SOB (SHORTNESS OF BREATH): ICD-10-CM

## 2025-05-14 DIAGNOSIS — E78.2 MIXED HYPERLIPIDEMIA: Primary | ICD-10-CM

## 2025-05-14 DIAGNOSIS — Z82.49 FAMILY HISTORY OF EARLY CAD: ICD-10-CM

## 2025-05-14 PROCEDURE — 93000 ELECTROCARDIOGRAM COMPLETE: CPT | Performed by: SPECIALIST

## 2025-05-14 PROCEDURE — 3079F DIAST BP 80-89 MM HG: CPT | Performed by: SPECIALIST

## 2025-05-14 PROCEDURE — 99214 OFFICE O/P EST MOD 30 MIN: CPT | Performed by: SPECIALIST

## 2025-05-14 PROCEDURE — 3075F SYST BP GE 130 - 139MM HG: CPT | Performed by: SPECIALIST

## 2025-05-14 PROCEDURE — G2211 COMPLEX E/M VISIT ADD ON: HCPCS | Performed by: SPECIALIST

## 2025-05-14 RX ORDER — ATENOLOL 25 MG/1
TABLET ORAL
Qty: 1 TABLET | Refills: 0 | Status: SHIPPED | OUTPATIENT
Start: 2025-05-14

## 2025-05-14 ASSESSMENT — PATIENT HEALTH QUESTIONNAIRE - PHQ9
SUM OF ALL RESPONSES TO PHQ QUESTIONS 1-9: 0
2. FEELING DOWN, DEPRESSED OR HOPELESS: NOT AT ALL
1. LITTLE INTEREST OR PLEASURE IN DOING THINGS: NOT AT ALL
SUM OF ALL RESPONSES TO PHQ QUESTIONS 1-9: 0

## 2025-05-14 NOTE — PROGRESS NOTES
[facility]                                     DIVISION OF CARDIOLOGY                                                                             OFFICE NOTE                  OXANA ALEGRIA M.D. , PRAKASH BOBO   1970  [id number]    Date/Time:  5/14/202512:58 PM      There were no vitals taken for this visit.       Wt Readings from Last 3 Encounters:   05/09/25 121.6 kg (268 lb)   05/06/25 121.6 kg (268 lb)   02/23/25 134.3 kg (296 lb)          Lab Results   Component Value Date    CHOL 99 (L) 04/24/2025    TRIG 77 04/24/2025    HDL 32 (L) 04/24/2025    VLDL 16 04/24/2025              SUBJECTIVE:  Unfortunately she has been having some issues in terms of shortness of breath and chest discomfort and fatigue.  Very stressed at work on the other hand.  She has been having some knee pain and discomfort which she has been seeing orthopedic.       Assessment/Plan    1.  Shortness of breath: Dyspnea be further evaluated.  She has undergone calcium score in November 2024 with a total score of 161.    Her EKG today reveals a normal sinus rhythm nonspecific ST-T changes poor wave progression in the anteroseptal leads.    She has a very strong family history for coronary events at young age less than 60.    Stress test normal in July 2024 and at this point I do not think repeating a stress test will be very helpful.    Proceed with CCTA.    2.  Murmur: Proceed with echocardiogram given also the shortness of breath to evaluate ejection fraction as well.    3.  Syncope: Not recurrent possibly related to pseudoseizure she has had a previous normal tilt table test and neurological workup in the past.    It was felt may be related to intense stress from work.    4. hypertension: Controlled at this time    5.  Diabetes: Closely followed by primary care physician.    6.  Hyperlipidemia: On Lipitor and Zetia closely followed by her primary care physician.    Otherwise I will see

## 2025-05-14 NOTE — PROGRESS NOTES
1. Have you been to the ER, urgent care clinic since your last visit?  Hospitalized since your last visit? YES, 4/24/25, [facility], Oste[facility]     2. Have you seen or consulted any other health care providers outside of the [facility] since your last visit?  Include any pap smears or colon screening. No

## 2025-05-14 NOTE — PATIENT INSTRUCTIONS
An order has been placed for a cardiac CTA. Expect a call from [facility]. If you would like, you may contact them at [phone].  You will need to take an additional beta blocker, atenolol 25 mg, 1 hour prior to the CTA.    You will be scheduled for an echocardiogram.     Schedule follow up with nurse practitioner after the above, approximately 2 months.

## 2025-05-27 ENCOUNTER — HOSPITAL ENCOUNTER (OUTPATIENT)
Facility: HOSPITAL | Age: 55
Discharge: HOME | End: 2025-05-30
Attending: SPECIALIST
Payer: COMMERCIAL

## 2025-05-27 VITALS
SYSTOLIC BLOOD PRESSURE: 140 MMHG | OXYGEN SATURATION: 96 % | RESPIRATION RATE: 16 BRPM | DIASTOLIC BLOOD PRESSURE: 83 MMHG | TEMPERATURE: 98.2 F | HEART RATE: 66 BPM

## 2025-05-27 DIAGNOSIS — R07.89 OTHER CHEST PAIN: ICD-10-CM

## 2025-05-27 DIAGNOSIS — R06.02 SOB (SHORTNESS OF BREATH): ICD-10-CM

## 2025-05-27 PROCEDURE — 75574 CT ANGIO HRT W/3D IMAGE: CPT | Performed by: INTERNAL MEDICINE

## 2025-05-27 PROCEDURE — 75574 CT ANGIO HRT W/3D IMAGE: CPT

## 2025-05-27 PROCEDURE — 6360000004 HC RX CONTRAST MEDICATION: Performed by: SPECIALIST

## 2025-05-27 PROCEDURE — 6370000000 HC RX 637 (ALT 250 FOR IP): Performed by: INTERNAL MEDICINE

## 2025-05-27 RX ORDER — IOPAMIDOL 755 MG/ML
100 INJECTION, SOLUTION INTRAVASCULAR
Status: COMPLETED | OUTPATIENT
Start: 2025-05-27 | End: 2025-05-27

## 2025-05-27 RX ORDER — NITROGLYCERIN 0.4 MG/1
0.8 TABLET SUBLINGUAL ONCE
Status: COMPLETED | OUTPATIENT
Start: 2025-05-27 | End: 2025-05-27

## 2025-05-27 RX ADMIN — IOPAMIDOL 100 ML: 755 INJECTION, SOLUTION INTRAVENOUS at 10:51

## 2025-05-27 RX ADMIN — NITROGLYCERIN 0.8 MG: 0.4 TABLET SUBLINGUAL at 10:33

## 2025-05-27 NOTE — DISCHARGE INSTRUCTIONS
[facility]  Department of Interventional Radiology  [address] [place] [address] [phone]    CARDIAC CTA DISCHARGE EDUCATION    Information:   You had a Cardiac CTA scan today, where you received medication to lower your heart rate and dilate the blood vessel around your heart. During the scan you also received IV contrast.     What should I expect after the Cardiac CTA?   The medicine may make you feel dizzy because it lowers blood pressure rapidly. You may have been given IV Metoprolol and Nitroglycerin tabs.    Precautions  Limit the amount of alcohol you drink. Too much alcohol can cause dizziness or fainting.  Don't take phosphodiesterase inhibitors for next 48 hours, such as sildenafil (Viagra) or tadalafil (Cialis) at any time if you are on nitroglycerin treatment. These are medicines used to treat sexual dysfunction in men. The combination of nitroglycerin with these medicines can cause a severe drop in blood pressure. This can lead to dizziness, fainting, heart attack, or stroke.  Possible side effects of nitroglycerin. Mild side effects include:  Headache  Dizziness or lightheadedness  Mouth tingling or burning  Edema  Abdominal pain  When to call your healthcare provider   Call your healthcare provider right away if any of the following occur:  Signs of an allergic reaction, such as trouble breathing, tightness in the chest or throat, wheezing, rash, hives, swelling of the mouth, face, or throat.  Severe headache  Severe dizziness, or fainting  Nausea or vomiting  Fast heartbeat (higher than 100 beats per minute), slow heartbeat, or irregular heartbeat  Flushing (redness of the face, neck, or chest)  Blurred eyesight  Dry mouth  Sweating a lot  Pale skin  Restlessness  Swollen ankles  Weakness or tiredness      Follow-up visit information:    Follow up with ordering Physician for result.      If you have any questions or concerns, please call

## 2025-06-01 DIAGNOSIS — E78.2 MIXED HYPERLIPIDEMIA: ICD-10-CM

## 2025-06-02 RX ORDER — ATORVASTATIN CALCIUM 80 MG/1
80 TABLET, FILM COATED ORAL NIGHTLY
Qty: 90 TABLET | Refills: 1 | Status: SHIPPED | OUTPATIENT
Start: 2025-06-02

## 2025-06-02 RX ORDER — EZETIMIBE 10 MG/1
10 TABLET ORAL DAILY
Qty: 90 TABLET | Refills: 1 | Status: SHIPPED | OUTPATIENT
Start: 2025-06-02

## 2025-06-02 NOTE — TELEPHONE ENCOUNTER
Requested Prescriptions     Signed Prescriptions Disp Refills    ezetimibe (ZETIA) 10 MG tablet 90 tablet 1     Sig: TAKE 1 TABLET BY MOUTH EVERY DAY     Authorizing Provider: OXANA ERNANDEZ     Ordering User: VITA RUSS     Verbal order per Dr. Ernandez.     Future Appointments   Date Time Provider Department Center   6/19/2025  9:00 AM Kylie Ibarra APRN - NP [facility]   8/7/2025  4:00 PM Cece Dove MD [facility] DEP

## 2025-06-02 NOTE — TELEPHONE ENCOUNTER
Requested Prescriptions     Signed Prescriptions Disp Refills    atorvastatin (LIPITOR) 80 MG tablet 90 tablet 1     Sig: TAKE 1 TABLET BY MOUTH AT BEDTIME     Authorizing Provider: OXANA ERNANDEZ     Ordering User: VITA RUSS     Verbal order per Dr. Ernandez.    Future Appointments   Date Time Provider Department Center   6/19/2025  9:00 AM Kylie Ibarra APRN - NP [facility]   8/7/2025  4:00 PM Cece Dove MD [facility] DEP

## 2025-06-19 ENCOUNTER — OFFICE VISIT (OUTPATIENT)
Age: 55
End: 2025-06-19
Payer: COMMERCIAL

## 2025-06-19 VITALS
SYSTOLIC BLOOD PRESSURE: 112 MMHG | HEART RATE: 80 BPM | OXYGEN SATURATION: 96 % | WEIGHT: 269.2 LBS | DIASTOLIC BLOOD PRESSURE: 70 MMHG | BODY MASS INDEX: 42.25 KG/M2 | HEIGHT: 67 IN

## 2025-06-19 DIAGNOSIS — E66.813 CLASS 3 SEVERE OBESITY WITH SERIOUS COMORBIDITY AND BODY MASS INDEX (BMI) OF 40.0 TO 44.9 IN ADULT (HCC): ICD-10-CM

## 2025-06-19 DIAGNOSIS — E78.2 MIXED HYPERLIPIDEMIA: ICD-10-CM

## 2025-06-19 DIAGNOSIS — I10 PRIMARY HYPERTENSION: ICD-10-CM

## 2025-06-19 DIAGNOSIS — E11.65 UNCONTROLLED TYPE 2 DIABETES MELLITUS WITH HYPERGLYCEMIA (HCC): ICD-10-CM

## 2025-06-19 DIAGNOSIS — R06.83 SNORING: Primary | ICD-10-CM

## 2025-06-19 PROCEDURE — 3046F HEMOGLOBIN A1C LEVEL >9.0%: CPT

## 2025-06-19 PROCEDURE — 3074F SYST BP LT 130 MM HG: CPT

## 2025-06-19 PROCEDURE — 99214 OFFICE O/P EST MOD 30 MIN: CPT

## 2025-06-19 PROCEDURE — 3078F DIAST BP <80 MM HG: CPT

## 2025-06-19 NOTE — PROGRESS NOTES
Patient: Elsa Mosley  : 1970    Primary Cardiologist:Dr. BLOSSOM Ernandez  EP Cardiologist:NONE   PCP: Cece Dove MD    Today's Date: 2025      ASSESSMENT AND PLAN:     Assessment and Plan:    1.  Shortness of breath:   She has undergone calcium score in 2024 with a total score of 162.  Her EKG 25 reveals a normal sinus rhythm nonspecific ST-T changes poor wave progression in the anteroseptal leads.  She has a very strong family history for coronary events at young age less than 60.  Stress test normal in 2024  Echo 25 EF 64% normal wall motion, mild calcification MV  CCTA 25   -Left main is short with early bifurcation into LAD and left circumflex. The  left main demonstrate mild focal calcified plaque with mild luminal stenosis.  -Mild focal calcified plaque of the ostial LAD and proximal LAD with less than  24% luminal stenosis.  -D1 diagonal branch with focal calcified and noncalcified plaque in the ostial  and proximal vessel without any significant luminal stenosis.  -Less than 24% luminal stenosis of proximal RCA. Distal RCA demonstrate mild  calcified plaque without any significant luminal stenosis.  -CAD RADS /P2  -CAC score 203    She reports today that she snores and can wake herself up with her snoring- referred to sleep medicine for evaluation    2.  Murmur:   Proceed with echocardiogram given also the shortness of breath to evaluate ejection fraction as well.  Echo 25 EF 64% normal wall motion, mild calcification MV  No murmur appreciated today    3.  Syncope:   Not recurrent possibly related to pseudoseizure she has had a previous normal tilt table test and neurological workup in the past.  It was felt may be related to intense stress from work.     4. hypertension:   Controlled at this time  Metoprolol, olmesartan/HCTZ  CMP stable 25     5.  Diabetes:   Closely followed by primary care physician every 3 months  On rybelsus,

## 2025-06-19 NOTE — PROGRESS NOTES
1. Have you been to the ER, urgent care clinic since your last visit?  Hospitalized since your last visit?No    2. Have you seen or consulted any other health care providers outside of the [facility] since your last visit?  Include any pap smears or colon screening. No

## 2025-08-07 ENCOUNTER — OFFICE VISIT (OUTPATIENT)
Facility: CLINIC | Age: 55
End: 2025-08-07
Payer: COMMERCIAL

## 2025-08-07 VITALS
HEART RATE: 103 BPM | BODY MASS INDEX: 42.69 KG/M2 | DIASTOLIC BLOOD PRESSURE: 82 MMHG | HEIGHT: 67 IN | TEMPERATURE: 99.3 F | SYSTOLIC BLOOD PRESSURE: 123 MMHG | RESPIRATION RATE: 16 BRPM | WEIGHT: 272 LBS

## 2025-08-07 DIAGNOSIS — N18.31 STAGE 3A CHRONIC KIDNEY DISEASE (HCC): ICD-10-CM

## 2025-08-07 DIAGNOSIS — E66.813 CLASS 3 SEVERE OBESITY WITH SERIOUS COMORBIDITY AND BODY MASS INDEX (BMI) OF 40.0 TO 44.9 IN ADULT (HCC): ICD-10-CM

## 2025-08-07 DIAGNOSIS — E11.65 UNCONTROLLED TYPE 2 DIABETES MELLITUS WITH HYPERGLYCEMIA (HCC): Primary | ICD-10-CM

## 2025-08-07 DIAGNOSIS — I10 PRIMARY HYPERTENSION: ICD-10-CM

## 2025-08-07 PROCEDURE — 3046F HEMOGLOBIN A1C LEVEL >9.0%: CPT | Performed by: STUDENT IN AN ORGANIZED HEALTH CARE EDUCATION/TRAINING PROGRAM

## 2025-08-07 PROCEDURE — 3079F DIAST BP 80-89 MM HG: CPT | Performed by: STUDENT IN AN ORGANIZED HEALTH CARE EDUCATION/TRAINING PROGRAM

## 2025-08-07 PROCEDURE — 99214 OFFICE O/P EST MOD 30 MIN: CPT | Performed by: STUDENT IN AN ORGANIZED HEALTH CARE EDUCATION/TRAINING PROGRAM

## 2025-08-07 PROCEDURE — 3074F SYST BP LT 130 MM HG: CPT | Performed by: STUDENT IN AN ORGANIZED HEALTH CARE EDUCATION/TRAINING PROGRAM

## 2025-08-08 LAB
ALBUMIN SERPL-MCNC: 3.6 G/DL (ref 3.5–5.2)
ALBUMIN/GLOB SERPL: 0.9 (ref 1.1–2.2)
ALP SERPL-CCNC: 124 U/L (ref 35–104)
ALT SERPL-CCNC: 27 U/L (ref 10–35)
ANION GAP SERPL CALC-SCNC: 13 MMOL/L (ref 2–14)
AST SERPL-CCNC: 20 U/L (ref 10–35)
BILIRUB SERPL-MCNC: 0.5 MG/DL (ref 0–1.2)
BUN SERPL-MCNC: 23 MG/DL (ref 6–20)
BUN/CREAT SERPL: 22 (ref 12–20)
CALCIUM SERPL-MCNC: 10.1 MG/DL (ref 8.6–10)
CHLORIDE SERPL-SCNC: 104 MMOL/L (ref 98–107)
CO2 SERPL-SCNC: 25 MMOL/L (ref 20–29)
CREAT SERPL-MCNC: 1.04 MG/DL (ref 0.6–1)
EST. AVERAGE GLUCOSE BLD GHB EST-MCNC: 201 MG/DL
GLOBULIN SER CALC-MCNC: 4 G/DL (ref 2–4)
GLUCOSE SERPL-MCNC: 154 MG/DL (ref 65–100)
HBA1C MFR BLD: 8.6 % (ref 4–5.6)
POTASSIUM SERPL-SCNC: 4.1 MMOL/L (ref 3.5–5.1)
PROT SERPL-MCNC: 7.6 G/DL (ref 6.4–8.3)
SODIUM SERPL-SCNC: 143 MMOL/L (ref 136–145)

## 2025-08-10 DIAGNOSIS — E11.65 UNCONTROLLED TYPE 2 DIABETES MELLITUS WITH HYPERGLYCEMIA (HCC): Primary | ICD-10-CM

## 2025-08-14 DIAGNOSIS — E11.65 UNCONTROLLED TYPE 2 DIABETES MELLITUS WITH HYPERGLYCEMIA (HCC): ICD-10-CM

## 2025-08-17 RX ORDER — GLIMEPIRIDE 4 MG/1
4 TABLET ORAL
Qty: 90 TABLET | Refills: 0 | Status: SHIPPED | OUTPATIENT
Start: 2025-08-17

## 2025-08-28 DIAGNOSIS — I10 PRIMARY HYPERTENSION: ICD-10-CM

## 2025-09-03 RX ORDER — METOPROLOL SUCCINATE 50 MG/1
50 TABLET, EXTENDED RELEASE ORAL EVERY EVENING
Qty: 90 TABLET | Refills: 1 | Status: SHIPPED | OUTPATIENT
Start: 2025-09-03

## (undated) LAB
25(OH)D3+25(OH)D2 SERPL-MCNC: 31.1 NG/ML (ref 30–100)
ALBUMIN SERPL-MCNC: 4.1 G/DL (ref 3.8–4.9)
ALBUMIN/CREAT UR: 22 MG/G CREAT (ref 0–29)
ALBUMIN/GLOB SERPL: 1.3 {RATIO} (ref 1.2–2.2)
ALP SERPL-CCNC: 136 IU/L (ref 44–121)
ALT SERPL-CCNC: 38 IU/L (ref 0–32)
APPEARANCE UR: CLEAR
AST SERPL-CCNC: 22 IU/L (ref 0–40)
BASOPHILS # BLD AUTO: 0 X10E3/UL (ref 0–0.2)
BASOPHILS NFR BLD AUTO: 0 %
BILIRUB SERPL-MCNC: 0.6 MG/DL (ref 0–1.2)
BILIRUB UR QL STRIP: NEGATIVE
BUN SERPL-MCNC: 13 MG/DL (ref 6–24)
BUN/CREAT SERPL: 15 (ref 9–23)
CALCIUM SERPL-MCNC: 9.5 MG/DL (ref 8.7–10.2)
CHLORIDE SERPL-SCNC: 102 MMOL/L (ref 96–106)
CHOLEST SERPL-MCNC: 121 MG/DL (ref 100–199)
CO2 SERPL-SCNC: 25 MMOL/L (ref 20–29)
COLOR UR: YELLOW
CREAT SERPL-MCNC: 0.86 MG/DL (ref 0.57–1)
CREAT UR-MCNC: 50.4 MG/DL
EGFRCR SERPLBLD CKD-EPI 2021: 81 ML/MIN/1.73
EOSINOPHIL # BLD AUTO: 0.1 X10E3/UL (ref 0–0.4)
EOSINOPHIL NFR BLD AUTO: 2 %
ERYTHROCYTE [DISTWIDTH] IN BLOOD BY AUTOMATED COUNT: 13.9 % (ref 11.7–15.4)
EST. AVERAGE GLUCOSE BLD GHB EST-MCNC: 180 MG/DL
GLOBULIN SER CALC-MCNC: 3.2 G/DL (ref 1.5–4.5)
GLUCOSE SERPL-MCNC: 126 MG/DL (ref 70–99)
GLUCOSE UR QL STRIP: NEGATIVE
HBA1C MFR BLD: 7.9 % (ref 4.8–5.6)
HCT VFR BLD AUTO: 39.9 % (ref 34–46.6)
HDLC SERPL-MCNC: 37 MG/DL
HGB BLD-MCNC: 12.7 G/DL (ref 11.1–15.9)
HGB UR QL STRIP: NEGATIVE
IMM GRANULOCYTES # BLD AUTO: 0 X10E3/UL (ref 0–0.1)
IMM GRANULOCYTES NFR BLD AUTO: 0 %
KETONES UR QL STRIP: NEGATIVE
LDLC SERPL CALC-MCNC: 70 MG/DL (ref 0–99)
LEUKOCYTE ESTERASE UR QL STRIP: NEGATIVE
LYMPHOCYTES # BLD AUTO: 1.9 X10E3/UL (ref 0.7–3.1)
LYMPHOCYTES NFR BLD AUTO: 24 %
MCH RBC QN AUTO: 25.7 PG (ref 26.6–33)
MCHC RBC AUTO-ENTMCNC: 31.8 G/DL (ref 31.5–35.7)
MCV RBC AUTO: 81 FL (ref 79–97)
MICRO URNS: NORMAL
MICROALBUMIN UR-MCNC: 11.3 UG/ML
MONOCYTES # BLD AUTO: 0.5 X10E3/UL (ref 0.1–0.9)
MONOCYTES NFR BLD AUTO: 6 %
NEUTROPHILS # BLD AUTO: 5.5 X10E3/UL (ref 1.4–7)
NEUTROPHILS NFR BLD AUTO: 68 %
NITRITE UR QL STRIP: NEGATIVE
PH UR STRIP: 7 [PH] (ref 5–7.5)
PLATELET # BLD AUTO: 358 X10E3/UL (ref 150–450)
POTASSIUM SERPL-SCNC: 4.5 MMOL/L (ref 3.5–5.2)
PROT SERPL-MCNC: 7.3 G/DL (ref 6–8.5)
PROT UR QL STRIP: NEGATIVE
RBC # BLD AUTO: 4.94 X10E6/UL (ref 3.77–5.28)
SODIUM SERPL-SCNC: 140 MMOL/L (ref 134–144)
SP GR UR STRIP: 1.01 (ref 1–1.03)
TRIGL SERPL-MCNC: 68 MG/DL (ref 0–149)
TSH SERPL DL<=0.005 MIU/L-ACNC: 2.23 UIU/ML (ref 0.45–4.5)
UROBILINOGEN UR STRIP-MCNC: 0.2 MG/DL (ref 0.2–1)
VLDLC SERPL CALC-MCNC: 14 MG/DL (ref 5–40)
WBC # BLD AUTO: 8 X10E3/UL (ref 3.4–10.8)